# Patient Record
Sex: FEMALE | Race: BLACK OR AFRICAN AMERICAN | Employment: OTHER | URBAN - METROPOLITAN AREA
[De-identification: names, ages, dates, MRNs, and addresses within clinical notes are randomized per-mention and may not be internally consistent; named-entity substitution may affect disease eponyms.]

---

## 2017-01-01 ENCOUNTER — HOSPITAL ENCOUNTER (OUTPATIENT)
Dept: OTHER | Age: 72
Discharge: HOME | End: 2017-01-31
Attending: INTERNAL MEDICINE | Admitting: INTERNAL MEDICINE

## 2017-01-24 ENCOUNTER — ANTI-COAG VISIT (OUTPATIENT)
Dept: PHARMACY | Age: 72
End: 2017-01-24

## 2017-01-24 DIAGNOSIS — Z95.2 S/P AORTIC VALVE REPLACEMENT: ICD-10-CM

## 2017-01-24 DIAGNOSIS — I48.91 ATRIAL FIBRILLATION, UNSPECIFIED TYPE (HCC): ICD-10-CM

## 2017-01-24 DIAGNOSIS — Z95.2 S/P MITRAL VALVE REPLACEMENT: ICD-10-CM

## 2017-01-24 LAB
INR BLD: 2.1
PROTIME: 25.3 SECONDS

## 2017-02-20 ENCOUNTER — ANTI-COAG VISIT (OUTPATIENT)
Dept: PHARMACY | Age: 72
End: 2017-02-20

## 2017-02-20 DIAGNOSIS — Z95.2 S/P AORTIC VALVE REPLACEMENT: ICD-10-CM

## 2017-02-20 DIAGNOSIS — I48.91 ATRIAL FIBRILLATION, UNSPECIFIED TYPE (HCC): ICD-10-CM

## 2017-02-20 DIAGNOSIS — Z95.2 S/P MITRAL VALVE REPLACEMENT: ICD-10-CM

## 2017-02-20 LAB — INR BLD: 2.3

## 2017-03-01 ENCOUNTER — HOSPITAL ENCOUNTER (OUTPATIENT)
Dept: OTHER | Age: 72
Discharge: HOME | End: 2017-03-31
Attending: INTERNAL MEDICINE | Admitting: INTERNAL MEDICINE

## 2017-03-20 ENCOUNTER — ANTI-COAG VISIT (OUTPATIENT)
Dept: PHARMACY | Age: 72
End: 2017-03-20

## 2017-03-20 DIAGNOSIS — I48.91 ATRIAL FIBRILLATION, UNSPECIFIED TYPE (HCC): ICD-10-CM

## 2017-03-20 DIAGNOSIS — Z95.2 S/P AORTIC VALVE REPLACEMENT: ICD-10-CM

## 2017-03-20 DIAGNOSIS — Z95.2 S/P MITRAL VALVE REPLACEMENT: ICD-10-CM

## 2017-03-20 LAB
INR BLD: 1.9
PROTIME: 22.7 SECONDS

## 2017-04-03 ENCOUNTER — ANTI-COAG VISIT (OUTPATIENT)
Dept: PHARMACY | Age: 72
End: 2017-04-03

## 2017-04-03 DIAGNOSIS — Z95.2 S/P MITRAL VALVE REPLACEMENT: ICD-10-CM

## 2017-04-03 DIAGNOSIS — Z95.2 S/P AORTIC VALVE REPLACEMENT: ICD-10-CM

## 2017-04-03 DIAGNOSIS — I48.91 ATRIAL FIBRILLATION, UNSPECIFIED TYPE (HCC): ICD-10-CM

## 2017-04-03 LAB — INR BLD: 3

## 2017-04-17 ENCOUNTER — ANTI-COAG VISIT (OUTPATIENT)
Dept: PHARMACY | Age: 72
End: 2017-04-17

## 2017-04-17 DIAGNOSIS — Z95.2 S/P MITRAL VALVE REPLACEMENT: ICD-10-CM

## 2017-04-17 DIAGNOSIS — I48.91 ATRIAL FIBRILLATION, UNSPECIFIED TYPE (HCC): ICD-10-CM

## 2017-04-17 DIAGNOSIS — Z95.2 S/P AORTIC VALVE REPLACEMENT: ICD-10-CM

## 2017-04-17 LAB — INR BLD: 4

## 2017-05-01 ENCOUNTER — ANTI-COAG VISIT (OUTPATIENT)
Dept: PHARMACY | Age: 72
End: 2017-05-01

## 2017-05-01 DIAGNOSIS — Z95.2 S/P AORTIC VALVE REPLACEMENT: ICD-10-CM

## 2017-05-01 DIAGNOSIS — Z95.2 S/P MITRAL VALVE REPLACEMENT: ICD-10-CM

## 2017-05-01 DIAGNOSIS — I48.91 ATRIAL FIBRILLATION, UNSPECIFIED TYPE (HCC): ICD-10-CM

## 2017-05-01 LAB
INR BLD: 2.3
PROTIME: 27.8 SECONDS

## 2017-05-03 ENCOUNTER — TELEPHONE (OUTPATIENT)
Dept: PHARMACY | Age: 72
End: 2017-05-03

## 2017-05-31 ENCOUNTER — ANTI-COAG VISIT (OUTPATIENT)
Dept: PHARMACY | Age: 72
End: 2017-05-31

## 2017-05-31 DIAGNOSIS — Z95.2 S/P AORTIC VALVE REPLACEMENT: ICD-10-CM

## 2017-05-31 DIAGNOSIS — I48.91 ATRIAL FIBRILLATION, UNSPECIFIED TYPE (HCC): ICD-10-CM

## 2017-05-31 DIAGNOSIS — Z95.2 S/P MITRAL VALVE REPLACEMENT: ICD-10-CM

## 2017-05-31 LAB
INR BLD: 2
PROTIME: 23.5 SECONDS

## 2017-06-12 ENCOUNTER — ANTI-COAG VISIT (OUTPATIENT)
Dept: PHARMACY | Age: 72
End: 2017-06-12

## 2017-06-12 DIAGNOSIS — Z95.2 S/P MITRAL VALVE REPLACEMENT: ICD-10-CM

## 2017-06-12 DIAGNOSIS — Z95.2 S/P AORTIC VALVE REPLACEMENT: ICD-10-CM

## 2017-06-12 DIAGNOSIS — I48.91 ATRIAL FIBRILLATION, UNSPECIFIED TYPE (HCC): ICD-10-CM

## 2017-06-12 LAB — INR BLD: 2.8

## 2017-06-21 ENCOUNTER — OFFICE VISIT (OUTPATIENT)
Dept: CARDIOLOGY CLINIC | Age: 72
End: 2017-06-21

## 2017-06-21 VITALS
BODY MASS INDEX: 35.51 KG/M2 | HEIGHT: 62 IN | HEART RATE: 112 BPM | WEIGHT: 193 LBS | SYSTOLIC BLOOD PRESSURE: 116 MMHG | DIASTOLIC BLOOD PRESSURE: 78 MMHG | OXYGEN SATURATION: 97 %

## 2017-06-21 DIAGNOSIS — I48.92 ATRIAL FLUTTER, UNSPECIFIED TYPE (HCC): Primary | Chronic | ICD-10-CM

## 2017-06-21 DIAGNOSIS — I48.0 PAROXYSMAL ATRIAL FIBRILLATION (HCC): ICD-10-CM

## 2017-06-21 DIAGNOSIS — I35.1 AORTIC VALVE INSUFFICIENCY, UNSPECIFIED ETIOLOGY: Chronic | ICD-10-CM

## 2017-06-21 DIAGNOSIS — I34.2 NON-RHEUMATIC MITRAL VALVE STENOSIS: Chronic | ICD-10-CM

## 2017-06-21 PROCEDURE — G8400 PT W/DXA NO RESULTS DOC: HCPCS | Performed by: INTERNAL MEDICINE

## 2017-06-21 PROCEDURE — 99213 OFFICE O/P EST LOW 20 MIN: CPT | Performed by: INTERNAL MEDICINE

## 2017-06-21 PROCEDURE — G8427 DOCREV CUR MEDS BY ELIG CLIN: HCPCS | Performed by: INTERNAL MEDICINE

## 2017-06-21 PROCEDURE — 1090F PRES/ABSN URINE INCON ASSESS: CPT | Performed by: INTERNAL MEDICINE

## 2017-06-21 PROCEDURE — 4040F PNEUMOC VAC/ADMIN/RCVD: CPT | Performed by: INTERNAL MEDICINE

## 2017-06-21 PROCEDURE — 93000 ELECTROCARDIOGRAM COMPLETE: CPT | Performed by: INTERNAL MEDICINE

## 2017-06-21 PROCEDURE — G8417 CALC BMI ABV UP PARAM F/U: HCPCS | Performed by: INTERNAL MEDICINE

## 2017-06-21 PROCEDURE — 3017F COLORECTAL CA SCREEN DOC REV: CPT | Performed by: INTERNAL MEDICINE

## 2017-06-21 PROCEDURE — 3014F SCREEN MAMMO DOC REV: CPT | Performed by: INTERNAL MEDICINE

## 2017-06-21 PROCEDURE — 1123F ACP DISCUSS/DSCN MKR DOCD: CPT | Performed by: INTERNAL MEDICINE

## 2017-06-21 PROCEDURE — 1036F TOBACCO NON-USER: CPT | Performed by: INTERNAL MEDICINE

## 2017-07-11 ENCOUNTER — ANTI-COAG VISIT (OUTPATIENT)
Dept: PHARMACY | Age: 72
End: 2017-07-11

## 2017-07-11 ENCOUNTER — OFFICE VISIT (OUTPATIENT)
Dept: CARDIOLOGY CLINIC | Age: 72
End: 2017-07-11

## 2017-07-11 VITALS
HEART RATE: 79 BPM | BODY MASS INDEX: 36.53 KG/M2 | DIASTOLIC BLOOD PRESSURE: 74 MMHG | HEIGHT: 61 IN | WEIGHT: 193.5 LBS | SYSTOLIC BLOOD PRESSURE: 134 MMHG

## 2017-07-11 DIAGNOSIS — Z95.2 S/P AORTIC VALVE REPLACEMENT: ICD-10-CM

## 2017-07-11 DIAGNOSIS — Z95.2 S/P MITRAL VALVE REPLACEMENT: ICD-10-CM

## 2017-07-11 DIAGNOSIS — I48.0 PAROXYSMAL ATRIAL FIBRILLATION (HCC): ICD-10-CM

## 2017-07-11 DIAGNOSIS — I48.92 ATRIAL FLUTTER, UNSPECIFIED TYPE (HCC): ICD-10-CM

## 2017-07-11 DIAGNOSIS — I34.2 NON-RHEUMATIC MITRAL VALVE STENOSIS: ICD-10-CM

## 2017-07-11 DIAGNOSIS — I48.0 PAROXYSMAL ATRIAL FIBRILLATION (HCC): Primary | ICD-10-CM

## 2017-07-11 DIAGNOSIS — E78.5 HYPERLIPIDEMIA, UNSPECIFIED HYPERLIPIDEMIA TYPE: ICD-10-CM

## 2017-07-11 DIAGNOSIS — I35.1 AORTIC VALVE INSUFFICIENCY, UNSPECIFIED ETIOLOGY: ICD-10-CM

## 2017-07-11 LAB — INR BLD: 3

## 2017-07-11 PROCEDURE — 1090F PRES/ABSN URINE INCON ASSESS: CPT | Performed by: INTERNAL MEDICINE

## 2017-07-11 PROCEDURE — G8400 PT W/DXA NO RESULTS DOC: HCPCS | Performed by: INTERNAL MEDICINE

## 2017-07-11 PROCEDURE — 3014F SCREEN MAMMO DOC REV: CPT | Performed by: INTERNAL MEDICINE

## 2017-07-11 PROCEDURE — G8417 CALC BMI ABV UP PARAM F/U: HCPCS | Performed by: INTERNAL MEDICINE

## 2017-07-11 PROCEDURE — G8427 DOCREV CUR MEDS BY ELIG CLIN: HCPCS | Performed by: INTERNAL MEDICINE

## 2017-07-11 PROCEDURE — 3017F COLORECTAL CA SCREEN DOC REV: CPT | Performed by: INTERNAL MEDICINE

## 2017-07-11 PROCEDURE — 99213 OFFICE O/P EST LOW 20 MIN: CPT | Performed by: INTERNAL MEDICINE

## 2017-07-11 PROCEDURE — 1036F TOBACCO NON-USER: CPT | Performed by: INTERNAL MEDICINE

## 2017-07-11 PROCEDURE — 1123F ACP DISCUSS/DSCN MKR DOCD: CPT | Performed by: INTERNAL MEDICINE

## 2017-07-11 PROCEDURE — 4040F PNEUMOC VAC/ADMIN/RCVD: CPT | Performed by: INTERNAL MEDICINE

## 2017-07-11 RX ORDER — ACETAMINOPHEN 500 MG
500 TABLET ORAL EVERY 6 HOURS PRN
COMMUNITY

## 2017-07-12 RX ORDER — CARVEDILOL 6.25 MG/1
6.25 TABLET ORAL 2 TIMES DAILY WITH MEALS
Qty: 180 TABLET | Refills: 3 | Status: SHIPPED | OUTPATIENT
Start: 2017-07-12 | End: 2017-08-23 | Stop reason: SDUPTHER

## 2017-08-08 ENCOUNTER — ANTI-COAG VISIT (OUTPATIENT)
Dept: PHARMACY | Age: 72
End: 2017-08-08

## 2017-08-08 DIAGNOSIS — I48.0 PAROXYSMAL ATRIAL FIBRILLATION (HCC): ICD-10-CM

## 2017-08-08 DIAGNOSIS — Z95.2 S/P AORTIC VALVE REPLACEMENT: ICD-10-CM

## 2017-08-08 DIAGNOSIS — Z95.2 S/P MITRAL VALVE REPLACEMENT: ICD-10-CM

## 2017-08-08 LAB — INR BLD: 3.3

## 2017-08-23 RX ORDER — CARVEDILOL 6.25 MG/1
TABLET ORAL
Qty: 180 TABLET | Refills: 3 | Status: SHIPPED | OUTPATIENT
Start: 2017-08-23 | End: 2018-09-04 | Stop reason: SDUPTHER

## 2017-09-05 ENCOUNTER — ANTI-COAG VISIT (OUTPATIENT)
Dept: PHARMACY | Age: 72
End: 2017-09-05

## 2017-09-05 DIAGNOSIS — Z95.2 S/P AORTIC VALVE REPLACEMENT: ICD-10-CM

## 2017-09-05 DIAGNOSIS — I48.0 PAROXYSMAL ATRIAL FIBRILLATION (HCC): ICD-10-CM

## 2017-09-05 DIAGNOSIS — Z95.2 S/P MITRAL VALVE REPLACEMENT: ICD-10-CM

## 2017-09-05 LAB
INR BLD: 2.2
PROTIME: 25.9 SECONDS

## 2017-10-02 ENCOUNTER — ANTI-COAG VISIT (OUTPATIENT)
Dept: PHARMACY | Age: 72
End: 2017-10-02

## 2017-10-02 DIAGNOSIS — Z95.2 S/P MITRAL VALVE REPLACEMENT: ICD-10-CM

## 2017-10-02 DIAGNOSIS — I48.0 PAROXYSMAL ATRIAL FIBRILLATION (HCC): ICD-10-CM

## 2017-10-02 DIAGNOSIS — Z95.2 S/P AORTIC VALVE REPLACEMENT: ICD-10-CM

## 2017-10-02 LAB
INR BLD: 3.4
PROTIME: 41.3 SECONDS

## 2017-10-02 NOTE — MR AVS SNAPSHOT
people who are more muscular. Even a small weight loss (between 5 and 10 percent of your current weight) by decreasing your calorie intake and becoming more physically active will help lower your risk of developing or worsening diseases associated with obesity. Learn more at: [email or web address]             Medications and Orders      Your Current Medications Are              carvedilol (COREG) 6.25 MG tablet TAKE 1 TABLET TWICE DAILY WITH FOOD    acetaminophen (TYLENOL) 500 MG tablet Take 500 mg by mouth every 6 hours as needed for Pain    Glucosamine-Chondroitin (GLUCOSAMINE CHONDR COMPLEX PO) Take 1 tablet by mouth daily    Probiotic Product (PROBIOTIC DAILY PO) Take by mouth daily    Vitamin D (CHOLECALCIFEROL) 1000 UNITS CAPS capsule Take 1,000 Units by mouth daily    simvastatin (ZOCOR) 40 MG tablet Take 40 mg by mouth nightly    warfarin (COUMADIN) 5 MG tablet One tab po nightly and on Friday 1 1/2 tab    Multiple Vitamins-Minerals (CENTRUM SILVER) TABS Take 1 tablet by mouth daily. Allergies           No Known Allergies      We Ordered/Performed the following           Protime-INR     Comments: This external order was created through the results console.          Result Summary for Protime-INR      Result Information     Status          Final result (Resulted: 10/2/2017 10:22 AM)           10/2/2017 10:22 AM      Component Results     Component Value Ref Range & Units Status    INR 3.4  Final    Protime 41.3 seconds Final               Additional Information        Basic Information     Date Of Birth Sex Race Ethnicity Preferred Language    1945 Female Black Non-/Non  English      Problem List as of 10/2/2017  Date Reviewed: 7/11/2017                Paroxysmal atrial fibrillation (HCC) (Chronic)    Atrial flutter (Chronic)    S/P aortic valve replacement (Chronic)    S/P mitral valve replacement (Chronic)    Hyperlipidemia (Chronic) 5. Create a "[employer]t ID. This will be your [employer] login ID and cannot be changed, so think of one that is secure and easy to remember. 6. Create a [employer] password. You can change your password at any time. 7. Enter your Password Reset Question and Answer. This can be used at a later time if you forget your password. 8. Enter your e-mail address. You will receive e-mail notification when new information is available in [address] 9. Click Sign Up. You can now view your medical record. Additional Information  If you have questions, please contact the physician practice where you receive care. Remember, [employer] is NOT to be used for urgent needs. For medical emergencies, dial 911. For questions regarding your [employer] account call [phone]. If you have a clinical question, please call your doctor's office. October 2017 Details    Sun Mon Tue Wed Thu Fri Sat     1               2      5 mg   See details      3      5 mg         4      5 mg         5      5 mg         6      5 mg         7      5 mg           8      5 mg         9      5 mg         10      5 mg         11      5 mg         12      5 mg         13      5 mg         14      5 mg           15      5 mg         16      5 mg         17      5 mg         18      5 mg         19      5 mg         20      5 mg         21      5 mg           22      5 mg         23      5 mg         24      5 mg         25      5 mg         26      5 mg         27      5 mg         28      5 mg           29      5 mg         30      5 mg         31                    Date Details   10/02 This INR check       Date of next INR:  10/30/2017         How to take your warfarin dose              To take:  5 mg Take 1 of the 5 mg tablets.

## 2017-10-30 ENCOUNTER — HOSPITAL ENCOUNTER (OUTPATIENT)
Dept: MAMMOGRAPHY | Age: 72
Discharge: HOME | End: 2017-10-30
Attending: INTERNAL MEDICINE | Admitting: INTERNAL MEDICINE

## 2017-10-30 ENCOUNTER — ANTI-COAG VISIT (OUTPATIENT)
Dept: PHARMACY | Age: 72
End: 2017-10-30

## 2017-10-30 DIAGNOSIS — Z12.31 ENCOUNTER FOR SCREENING MAMMOGRAM FOR BREAST CANCER: ICD-10-CM

## 2017-10-30 DIAGNOSIS — Z95.2 S/P AORTIC VALVE REPLACEMENT: ICD-10-CM

## 2017-10-30 DIAGNOSIS — I48.0 PAROXYSMAL ATRIAL FIBRILLATION (HCC): ICD-10-CM

## 2017-10-30 DIAGNOSIS — Z95.2 S/P MITRAL VALVE REPLACEMENT: ICD-10-CM

## 2017-10-30 LAB
INR BLD: 1.9
PROTIME: 22.7 SECONDS

## 2017-10-30 NOTE — PROGRESS NOTES
Zuly Becerra Ms. Domenico Barrera is a 81 y.o. y/o female with history of Afib, Aortic and Mitral Valve Porcine valve  She presents today for anticoagulation monitoring and adjustment. Pertinent PMH: The aortic and mitral valves are porcine and were replaced at the same time on March 9, 2010 by Dr. Nikhil Méndez. Patient Reported Findings:  Yes     No  [x]   []       Patient verifies current dosing regimen as listed  []   [x]       S/S bleeding/bruising/swelling/SOB  []   [x]       Blood in urine or stool  []   [x]       Procedures scheduled in the future at this time  []   [x]       Missed Dose  []   [x]       Extra Dose  []   [x]       Change in medications  [x]   []       Change in health/diet/appetite. Patient states that she has had more vegetables. She does not think that she will be able to regulate vegetables. Discussed that this is the cause for fluctuating INR as patient eats spinach, broccoli, shahzad greens. []   [x]       Change in alcohol use  []   [x]       Change in activity  []   [x]       Hospital admission  []   [x]       Emergency department visit  []   [x]       Other complaints    Clinical Outcomes:  Yes     No  []   [x]       Major bleeding event  []   [x]       Thromboembolic event    Duration of warfarin Therapy: indefinitely  INR Range:  2.0-3.0 Keep at 2.5-3.0 per the collaborative agreement from Dr. Rondell Harada    INR 1.9 today d/t recent change in vegetable diet. Take 7.5 mg tonight then continue same weekly warfarin dose of 5mg daily. Discussed about establishing a consistency of her vegetable/salad diet.  Going to begin eating vegetables twice a week rather than the large amount she has been eating  Recheck INR in 4 weeks, 11/27  Consider resuming 6 week appointments when appropriate    Referring cardiologist is Dr. Rondell Harada --> getting new cardiologist in [facility], assess at next visit  INR (no units)   Date Value   10/30/2017 1.9   10/02/2017 3.4   09/05/2017 2.2   08/08/2017 3.3

## 2017-11-01 ENCOUNTER — HOSPITAL ENCOUNTER (OUTPATIENT)
Dept: OTHER | Age: 72
Discharge: HOME | End: 2017-11-30
Attending: INTERNAL MEDICINE | Admitting: INTERNAL MEDICINE

## 2017-11-27 ENCOUNTER — ANTI-COAG VISIT (OUTPATIENT)
Dept: PHARMACY | Age: 72
End: 2017-11-27

## 2017-11-27 DIAGNOSIS — Z95.2 S/P AORTIC VALVE REPLACEMENT: ICD-10-CM

## 2017-11-27 DIAGNOSIS — Z95.2 S/P MITRAL VALVE REPLACEMENT: ICD-10-CM

## 2017-11-27 DIAGNOSIS — I48.0 PAROXYSMAL ATRIAL FIBRILLATION (HCC): ICD-10-CM

## 2017-11-27 LAB
INR BLD: 1.9
PROTIME: 22.5 SECONDS

## 2017-11-27 RX ORDER — FERROUS SULFATE 325(65) MG
325 TABLET ORAL
COMMUNITY
End: 2018-04-24

## 2017-11-27 RX ORDER — CALCIUM CARBONATE 500(1250)
500 TABLET ORAL DAILY
COMMUNITY
End: 2018-04-24

## 2017-11-27 NOTE — PROGRESS NOTES
Rosemarie Martino Ms. Aron Mcknight is a 81 y.o. y/o female with history of Afib, Aortic and Mitral Valve Porcine valve  She presents today for anticoagulation monitoring and adjustment. Pertinent PMH: The aortic and mitral valves are porcine and were replaced at the same time on March 9, 2010 by Dr. Delmer Post. Patient Reported Findings:  Yes     No  [x]   []       Patient verifies current dosing regimen as listed  []   [x]       S/S bleeding/bruising/swelling/SOB  []   [x]       Blood in urine or stool  []   [x]       Procedures scheduled in the future at this time  []   [x]       Missed Dose  []   [x]       Extra Dose  []   [x]       Change in medications  []   []       Change in health/diet/appetite. She has stated in the past that she does not think that she can regulate vegetables. Discussed that this is the cause for fluctuating INR as patient eats spinach, broccoli, shahzad greens.  Again emphasized the need for consitency  []   [x]       Change in alcohol use  []   [x]       Change in activity  []   [x]       Hospital admission  []   [x]       Emergency department visit  []   [x]       Other complaints    Clinical Outcomes:  Yes     No  []   [x]       Major bleeding event  []   [x]       Thromboembolic event    Duration of warfarin Therapy: indefinitely  INR Range:  2.0-3.0 Keep at 2.5-3.0 per the collaborative agreement from Dr. Damon Abdullahi    INR 1.9 again today   Add 1mg (pinktablet) by increasing weekly dose to 6mg on Mon & Fri only and 5mg all other days(5.7% increase)  Recheck INR in 4 weeks  Consider resuming 6 week appointments when appropriate    Referring cardiologist is Dr. Stewart(formerly with Dr. Damon Abdullahi)  INR (no units)   Date Value   11/27/2017 1.9   10/30/2017 1.9   10/02/2017 3.4   09/05/2017 2.2

## 2017-11-28 ENCOUNTER — HOSPITAL ENCOUNTER (OUTPATIENT)
Dept: OTHER | Age: 72
Discharge: HOME | End: 2017-11-28
Attending: INTERNAL MEDICINE | Admitting: INTERNAL MEDICINE

## 2017-12-01 ENCOUNTER — HOSPITAL ENCOUNTER (OUTPATIENT)
Dept: OTHER | Age: 72
Discharge: HOME | End: 2017-12-31
Attending: INTERNAL MEDICINE | Admitting: INTERNAL MEDICINE

## 2017-12-27 ENCOUNTER — HOSPITAL ENCOUNTER (OUTPATIENT)
Dept: OTHER | Age: 72
Discharge: HOME | End: 2017-12-27
Attending: INTERNAL MEDICINE | Admitting: INTERNAL MEDICINE

## 2017-12-27 ENCOUNTER — ANTI-COAG VISIT (OUTPATIENT)
Dept: PHARMACY | Age: 72
End: 2017-12-27

## 2017-12-27 DIAGNOSIS — Z95.2 S/P MITRAL VALVE REPLACEMENT: ICD-10-CM

## 2017-12-27 DIAGNOSIS — I48.0 PAROXYSMAL ATRIAL FIBRILLATION (HCC): ICD-10-CM

## 2017-12-27 DIAGNOSIS — Z95.2 S/P AORTIC VALVE REPLACEMENT: ICD-10-CM

## 2017-12-27 LAB
A/G RATIO: 1.2 (ref 1.1–2.2)
ALBUMIN SERPL-MCNC: 4.4 G/DL (ref 3.4–5)
ALP BLD-CCNC: 103 U/L (ref 40–129)
ALT SERPL-CCNC: 15 U/L (ref 10–40)
ANION GAP SERPL CALCULATED.3IONS-SCNC: 16 MMOL/L (ref 3–16)
AST SERPL-CCNC: 20 U/L (ref 15–37)
BILIRUB SERPL-MCNC: 0.4 MG/DL (ref 0–1)
BILIRUBIN URINE: NEGATIVE
BLOOD, URINE: ABNORMAL
BUN BLDV-MCNC: 12 MG/DL (ref 7–20)
CALCIUM SERPL-MCNC: 9.8 MG/DL (ref 8.3–10.6)
CHLORIDE BLD-SCNC: 104 MMOL/L (ref 99–110)
CHOLESTEROL, TOTAL: 139 MG/DL (ref 0–199)
CLARITY: ABNORMAL
CO2: 26 MMOL/L (ref 21–32)
COLOR: YELLOW
COMMENT UA: ABNORMAL
CREAT SERPL-MCNC: 0.9 MG/DL (ref 0.6–1.2)
EPITHELIAL CELLS, UA: 1 /HPF (ref 0–5)
GFR AFRICAN AMERICAN: >60
GFR NON-AFRICAN AMERICAN: >60
GLOBULIN: 3.8 G/DL
GLUCOSE BLD-MCNC: 98 MG/DL (ref 70–99)
GLUCOSE URINE: NEGATIVE MG/DL
HCT VFR BLD CALC: 39.2 % (ref 36–48)
HDLC SERPL-MCNC: 49 MG/DL (ref 40–60)
HEMOGLOBIN: 12.7 G/DL (ref 12–16)
HYALINE CASTS: 1 /LPF (ref 0–8)
INR BLD: 3.3
KETONES, URINE: NEGATIVE MG/DL
LDL CHOLESTEROL CALCULATED: 67 MG/DL
LEUKOCYTE ESTERASE, URINE: ABNORMAL
MCH RBC QN AUTO: 28 PG (ref 26–34)
MCHC RBC AUTO-ENTMCNC: 32.3 G/DL (ref 31–36)
MCV RBC AUTO: 86.5 FL (ref 80–100)
MICROSCOPIC EXAMINATION: YES
NITRITE, URINE: NEGATIVE
PDW BLD-RTO: 14.5 % (ref 12.4–15.4)
PH UA: 6.5
PLATELET # BLD: 204 K/UL (ref 135–450)
PMV BLD AUTO: 9.6 FL (ref 5–10.5)
POTASSIUM SERPL-SCNC: 4.1 MMOL/L (ref 3.5–5.1)
PROTEIN UA: NEGATIVE MG/DL
PROTIME: 39.6 SECONDS
RBC # BLD: 4.53 M/UL (ref 4–5.2)
RBC UA: 2 /HPF (ref 0–4)
SODIUM BLD-SCNC: 146 MMOL/L (ref 136–145)
SPECIFIC GRAVITY UA: 1.02
TOTAL PROTEIN: 8.2 G/DL (ref 6.4–8.2)
TRIGL SERPL-MCNC: 116 MG/DL (ref 0–150)
URINE TYPE: ABNORMAL
UROBILINOGEN, URINE: 1 E.U./DL
VLDLC SERPL CALC-MCNC: 23 MG/DL
WBC # BLD: 5.3 K/UL (ref 4–11)
WBC UA: 24 /HPF (ref 0–5)

## 2017-12-27 NOTE — PROGRESS NOTES
Santiago Price Ms. Luz Elena Ortiz is a 81 y.o. y/o female with history of Afib, Aortic and Mitral Valve Porcine valve  She presents today for anticoagulation monitoring and adjustment. Pertinent PMH: The aortic and mitral valves are porcine and were replaced at the same time on March 9, 2010 by Dr. Sherri Abdullahi. Patient Reported Findings:  Yes     No  [x]   []       Patient verifies current dosing regimen as listed  []   [x]       S/S bleeding/bruising/swelling/SOB  []   [x]       Blood in urine or stool  []   [x]       Procedures scheduled in the future at this time  []   [x]       Missed Dose  []   [x]       Extra Dose  []   [x]       Change in medications  []   []       Change in health/diet/appetite. She has stated in the past that she does not think that she can regulate vegetables. Discussed that this is the cause for fluctuating INR as patient eats spinach, broccoli, shahzad greens. Again emphasized the need for consitency  []   [x]       Change in alcohol use  []   [x]       Change in activity  []   [x]       Hospital admission  []   [x]       Emergency department visit  []   [x]       Other complaints    Clinical Outcomes:  Yes     No  []   [x]       Major bleeding event  []   [x]       Thromboembolic event    Duration of warfarin Therapy: indefinitely  INR Range:  2.0-3.0 Keep at 2.5-3.0 per the collaborative agreement from Dr. Iveth Johnson    INR 3.3 today   Decrease weekly dose to 6mg on Mon only and 5mg all other days(2.7% decrease)  Recheck INR in 3 weeks, 1/17  Consider resuming 6 week appointments when appropriate    No new collaborative was sent to Dr. John Swain. Sent new collaborative for MD to sign. Will need patient signature at next appt.      Referring cardiologist is Dr. Stephens Pod with Dr. Iveth Johnson)   INR (no units)   Date Value   12/27/2017 3.3   11/27/2017 1.9   10/30/2017 1.9   10/02/2017 3.4

## 2018-01-01 ENCOUNTER — HOSPITAL ENCOUNTER (OUTPATIENT)
Dept: OTHER | Age: 73
Discharge: HOME | End: 2018-01-31
Attending: INTERNAL MEDICINE | Admitting: INTERNAL MEDICINE

## 2018-01-03 ENCOUNTER — TELEPHONE (OUTPATIENT)
Dept: PHARMACY | Age: 73
End: 2018-01-03

## 2018-01-17 ENCOUNTER — ANTI-COAG VISIT (OUTPATIENT)
Dept: PHARMACY | Age: 73
End: 2018-01-17

## 2018-01-17 DIAGNOSIS — Z95.2 S/P AORTIC VALVE REPLACEMENT: ICD-10-CM

## 2018-01-17 DIAGNOSIS — I48.0 PAROXYSMAL ATRIAL FIBRILLATION (HCC): ICD-10-CM

## 2018-01-17 DIAGNOSIS — Z95.2 S/P MITRAL VALVE REPLACEMENT: ICD-10-CM

## 2018-01-17 LAB
INR BLD: 2.1
PROTIME: 25.2 SECONDS

## 2018-02-01 ENCOUNTER — HOSPITAL ENCOUNTER (OUTPATIENT)
Dept: OTHER | Age: 73
Discharge: HOME | End: 2018-02-28
Attending: INTERNAL MEDICINE | Admitting: INTERNAL MEDICINE

## 2018-02-14 ENCOUNTER — HOSPITAL ENCOUNTER (OUTPATIENT)
Dept: OTHER | Age: 73
Discharge: HOME | End: 2018-02-14
Attending: INTERNAL MEDICINE | Admitting: INTERNAL MEDICINE

## 2018-02-14 ENCOUNTER — ANTI-COAG VISIT (OUTPATIENT)
Dept: PHARMACY | Age: 73
End: 2018-02-14

## 2018-02-14 DIAGNOSIS — I48.0 PAROXYSMAL ATRIAL FIBRILLATION (HCC): ICD-10-CM

## 2018-02-14 DIAGNOSIS — Z95.2 S/P AORTIC VALVE REPLACEMENT: ICD-10-CM

## 2018-02-14 DIAGNOSIS — Z95.2 S/P MITRAL VALVE REPLACEMENT: ICD-10-CM

## 2018-02-14 LAB
INR BLD: 2.5
PROTIME: 30.3 SECONDS
VITAMIN D 25-HYDROXY: 57 NG/ML

## 2018-03-01 ENCOUNTER — HOSPITAL ENCOUNTER (OUTPATIENT)
Dept: OTHER | Age: 73
Discharge: HOME | End: 2018-03-31
Attending: INTERNAL MEDICINE | Admitting: INTERNAL MEDICINE

## 2018-03-14 ENCOUNTER — ANTI-COAG VISIT (OUTPATIENT)
Dept: PHARMACY | Age: 73
End: 2018-03-14

## 2018-03-14 DIAGNOSIS — I48.0 PAROXYSMAL ATRIAL FIBRILLATION (HCC): ICD-10-CM

## 2018-03-14 DIAGNOSIS — Z95.2 S/P AORTIC VALVE REPLACEMENT: ICD-10-CM

## 2018-03-14 DIAGNOSIS — Z95.2 S/P MITRAL VALVE REPLACEMENT: ICD-10-CM

## 2018-03-14 LAB
INR BLD: 2.6
PROTIME: 30.9 SECONDS

## 2018-04-01 ENCOUNTER — HOSPITAL ENCOUNTER (OUTPATIENT)
Dept: OTHER | Age: 73
Discharge: HOME | End: 2018-04-30
Attending: INTERNAL MEDICINE | Admitting: INTERNAL MEDICINE

## 2018-04-11 ENCOUNTER — ANTI-COAG VISIT (OUTPATIENT)
Dept: PHARMACY | Age: 73
End: 2018-04-11

## 2018-04-11 DIAGNOSIS — Z95.2 S/P MITRAL VALVE REPLACEMENT: ICD-10-CM

## 2018-04-11 DIAGNOSIS — Z95.2 S/P AORTIC VALVE REPLACEMENT: ICD-10-CM

## 2018-04-11 DIAGNOSIS — I48.0 PAROXYSMAL ATRIAL FIBRILLATION (HCC): ICD-10-CM

## 2018-04-11 LAB
INR BLD: 2.3
PROTIME: 27 SECONDS

## 2018-04-24 ENCOUNTER — OFFICE VISIT (OUTPATIENT)
Dept: CARDIOLOGY CLINIC | Age: 73
End: 2018-04-24

## 2018-04-24 ENCOUNTER — TELEPHONE (OUTPATIENT)
Dept: CARDIOLOGY CLINIC | Age: 73
End: 2018-04-24

## 2018-04-24 VITALS
SYSTOLIC BLOOD PRESSURE: 112 MMHG | HEART RATE: 76 BPM | BODY MASS INDEX: 34.96 KG/M2 | HEIGHT: 62 IN | WEIGHT: 190 LBS | DIASTOLIC BLOOD PRESSURE: 70 MMHG

## 2018-04-24 DIAGNOSIS — E78.5 HYPERLIPIDEMIA, UNSPECIFIED HYPERLIPIDEMIA TYPE: Chronic | ICD-10-CM

## 2018-04-24 DIAGNOSIS — Z95.2 S/P MITRAL VALVE REPLACEMENT: Chronic | ICD-10-CM

## 2018-04-24 DIAGNOSIS — I48.0 PAROXYSMAL ATRIAL FIBRILLATION (HCC): Primary | Chronic | ICD-10-CM

## 2018-04-24 DIAGNOSIS — Z95.2 S/P AORTIC VALVE REPLACEMENT: Chronic | ICD-10-CM

## 2018-04-24 PROCEDURE — 1090F PRES/ABSN URINE INCON ASSESS: CPT | Performed by: INTERNAL MEDICINE

## 2018-04-24 PROCEDURE — G8400 PT W/DXA NO RESULTS DOC: HCPCS | Performed by: INTERNAL MEDICINE

## 2018-04-24 PROCEDURE — 4040F PNEUMOC VAC/ADMIN/RCVD: CPT | Performed by: INTERNAL MEDICINE

## 2018-04-24 PROCEDURE — G8427 DOCREV CUR MEDS BY ELIG CLIN: HCPCS | Performed by: INTERNAL MEDICINE

## 2018-04-24 PROCEDURE — 1036F TOBACCO NON-USER: CPT | Performed by: INTERNAL MEDICINE

## 2018-04-24 PROCEDURE — 99214 OFFICE O/P EST MOD 30 MIN: CPT | Performed by: INTERNAL MEDICINE

## 2018-04-24 PROCEDURE — G8417 CALC BMI ABV UP PARAM F/U: HCPCS | Performed by: INTERNAL MEDICINE

## 2018-04-24 PROCEDURE — 1123F ACP DISCUSS/DSCN MKR DOCD: CPT | Performed by: INTERNAL MEDICINE

## 2018-04-24 PROCEDURE — 3017F COLORECTAL CA SCREEN DOC REV: CPT | Performed by: INTERNAL MEDICINE

## 2018-05-01 ENCOUNTER — HOSPITAL ENCOUNTER (OUTPATIENT)
Dept: OTHER | Age: 73
Discharge: HOME | End: 2018-05-31
Attending: INTERNAL MEDICINE | Admitting: INTERNAL MEDICINE

## 2018-05-09 ENCOUNTER — ANTI-COAG VISIT (OUTPATIENT)
Dept: PHARMACY | Age: 73
End: 2018-05-09

## 2018-05-09 DIAGNOSIS — I48.0 PAROXYSMAL ATRIAL FIBRILLATION (HCC): ICD-10-CM

## 2018-05-09 DIAGNOSIS — Z95.2 S/P MITRAL VALVE REPLACEMENT: ICD-10-CM

## 2018-05-09 DIAGNOSIS — Z95.2 S/P AORTIC VALVE REPLACEMENT: ICD-10-CM

## 2018-05-09 LAB
INR BLD: 4.7
PROTIME: 56.3 SECONDS

## 2018-05-18 ENCOUNTER — ANTI-COAG VISIT (OUTPATIENT)
Dept: PHARMACY | Age: 73
End: 2018-05-18

## 2018-05-18 DIAGNOSIS — Z95.2 S/P MITRAL VALVE REPLACEMENT: ICD-10-CM

## 2018-05-18 DIAGNOSIS — I48.0 PAROXYSMAL ATRIAL FIBRILLATION (HCC): ICD-10-CM

## 2018-05-18 DIAGNOSIS — Z95.2 S/P AORTIC VALVE REPLACEMENT: ICD-10-CM

## 2018-05-18 LAB
INR BLD: 2.2
PROTIME: 26.8 SECONDS

## 2018-05-25 ENCOUNTER — TELEPHONE (OUTPATIENT)
Dept: CARDIOLOGY CLINIC | Age: 73
End: 2018-05-25

## 2018-06-01 ENCOUNTER — HOSPITAL ENCOUNTER (OUTPATIENT)
Dept: OTHER | Age: 73
Discharge: HOME | End: 2018-06-30
Attending: INTERNAL MEDICINE | Admitting: INTERNAL MEDICINE

## 2018-06-14 ENCOUNTER — HOSPITAL ENCOUNTER (OUTPATIENT)
Dept: ENDOSCOPY | Age: 73
Discharge: HOME | End: 2018-06-14
Attending: INTERNAL MEDICINE | Admitting: INTERNAL MEDICINE

## 2018-06-14 LAB
INR BLD: 1.34 (ref 0.86–1.14)
PROTHROMBIN TIME: 15.3 SEC (ref 9.8–13)

## 2018-06-14 RX ORDER — SODIUM CHLORIDE 0.9 % (FLUSH) 0.9 %
10 SYRINGE (ML) INJECTION PRN
Status: DISCONTINUED | OUTPATIENT
Start: 2018-06-14 | End: 2018-06-15 | Stop reason: HOSPADM

## 2018-06-14 RX ORDER — SODIUM CHLORIDE 0.9 % (FLUSH) 0.9 %
10 SYRINGE (ML) INJECTION EVERY 12 HOURS SCHEDULED
Status: DISCONTINUED | OUTPATIENT
Start: 2018-06-14 | End: 2018-06-15 | Stop reason: HOSPADM

## 2018-06-14 RX ORDER — SODIUM CHLORIDE 9 MG/ML
INJECTION, SOLUTION INTRAVENOUS CONTINUOUS
Status: DISCONTINUED | OUTPATIENT
Start: 2018-06-14 | End: 2018-06-15 | Stop reason: HOSPADM

## 2018-06-18 ENCOUNTER — ANTI-COAG VISIT (OUTPATIENT)
Dept: PHARMACY | Age: 73
End: 2018-06-18

## 2018-06-18 DIAGNOSIS — I48.0 PAROXYSMAL ATRIAL FIBRILLATION (HCC): ICD-10-CM

## 2018-06-18 DIAGNOSIS — Z95.2 S/P AORTIC VALVE REPLACEMENT: ICD-10-CM

## 2018-06-18 DIAGNOSIS — Z95.2 S/P MITRAL VALVE REPLACEMENT: ICD-10-CM

## 2018-06-18 LAB
INR BLD: 1.8
PROTIME: 22 SECONDS

## 2018-06-21 ENCOUNTER — OFFICE VISIT (OUTPATIENT)
Dept: CARDIOLOGY CLINIC | Age: 73
End: 2018-06-21

## 2018-06-21 VITALS
HEIGHT: 62 IN | WEIGHT: 189 LBS | DIASTOLIC BLOOD PRESSURE: 72 MMHG | HEART RATE: 73 BPM | SYSTOLIC BLOOD PRESSURE: 118 MMHG | BODY MASS INDEX: 34.78 KG/M2

## 2018-06-21 DIAGNOSIS — I48.3 TYPICAL ATRIAL FLUTTER (HCC): ICD-10-CM

## 2018-06-21 DIAGNOSIS — Z79.01 WARFARIN ANTICOAGULATION: ICD-10-CM

## 2018-06-21 DIAGNOSIS — Z95.2 S/P AORTIC VALVE REPLACEMENT: Chronic | ICD-10-CM

## 2018-06-21 DIAGNOSIS — Z95.2 S/P MITRAL VALVE REPLACEMENT: Primary | Chronic | ICD-10-CM

## 2018-06-21 PROCEDURE — G8400 PT W/DXA NO RESULTS DOC: HCPCS | Performed by: INTERNAL MEDICINE

## 2018-06-21 PROCEDURE — G8417 CALC BMI ABV UP PARAM F/U: HCPCS | Performed by: INTERNAL MEDICINE

## 2018-06-21 PROCEDURE — 3017F COLORECTAL CA SCREEN DOC REV: CPT | Performed by: INTERNAL MEDICINE

## 2018-06-21 PROCEDURE — 4040F PNEUMOC VAC/ADMIN/RCVD: CPT | Performed by: INTERNAL MEDICINE

## 2018-06-21 PROCEDURE — 1036F TOBACCO NON-USER: CPT | Performed by: INTERNAL MEDICINE

## 2018-06-21 PROCEDURE — 99213 OFFICE O/P EST LOW 20 MIN: CPT | Performed by: INTERNAL MEDICINE

## 2018-06-21 PROCEDURE — 1090F PRES/ABSN URINE INCON ASSESS: CPT | Performed by: INTERNAL MEDICINE

## 2018-06-21 PROCEDURE — 93000 ELECTROCARDIOGRAM COMPLETE: CPT | Performed by: INTERNAL MEDICINE

## 2018-06-21 PROCEDURE — 1123F ACP DISCUSS/DSCN MKR DOCD: CPT | Performed by: INTERNAL MEDICINE

## 2018-06-21 PROCEDURE — G8427 DOCREV CUR MEDS BY ELIG CLIN: HCPCS | Performed by: INTERNAL MEDICINE

## 2018-07-01 ENCOUNTER — HOSPITAL ENCOUNTER (OUTPATIENT)
Dept: OTHER | Age: 73
Discharge: HOME | End: 2018-07-31
Attending: INTERNAL MEDICINE | Admitting: INTERNAL MEDICINE

## 2018-07-02 ENCOUNTER — ANTI-COAG VISIT (OUTPATIENT)
Dept: PHARMACY | Age: 73
End: 2018-07-02

## 2018-07-02 DIAGNOSIS — Z95.2 S/P MITRAL VALVE REPLACEMENT: ICD-10-CM

## 2018-07-02 DIAGNOSIS — Z95.2 S/P AORTIC VALVE REPLACEMENT: ICD-10-CM

## 2018-07-02 DIAGNOSIS — I48.0 PAROXYSMAL ATRIAL FIBRILLATION (HCC): ICD-10-CM

## 2018-07-02 LAB
INR BLD: 3.5
PROTIME: 42.4 SECONDS

## 2018-07-02 NOTE — PROGRESS NOTES
Alisson Banuelos Ms. Kelvin Drew is a 72 y.o. y/o female with history of Afib, Aortic and Mitral Valve Porcine valve. She presents today for anticoagulation monitoring and adjustment. Pertinent PMH: The aortic and mitral valves are porcine and were replaced at the same time on March 9, 2010 by Dr. Mili Crowell. Patient Reported Findings:  Yes     No  [x]   []       Patient verifies current dosing regimen as listed  []   [x]       S/S bleeding/bruising/swelling/SOB   []   [x]       Blood in urine or stool - Denies  []   [x]       Procedures scheduled in the future at this time  - Had colonoscopy on 6/14, held warfarin 3 days prior then restarted taking normal weekly dose, was dosed per cardiology. []   [x]       Missed Dose  []   [x]       Extra Dose  []   [x]       Change in medications  [x]   []       Change in health/diet/appetite. - Reports fluctuation in vegetable intake again. She has stated in the past that she does not think that she can regulate vegetables. Have discussed that this is the cause for fluctuating INR as patient normally eats spinach, broccoli, shahzad greens. Continue to emphasize the need for consistency. []   [x]       Change in alcohol use  []   [x]       Change in activity  []   [x]       Hospital admission  []   [x]       Emergency department visit  []   [x]       Other complaints    Clinical Outcomes:  Yes     No  []   [x]       Major bleeding event  []   [x]       Thromboembolic event    Duration of warfarin Therapy: Indefinitely  INR Range:  2.0-3.0   Keep at 2.5-3.0 per the collaborative agreement from Dr. Mary Vincent    INR 3.5 today. Possibly d/t small boost at last visit and/or inconsistency with vitamin K intake. Take 5mg tonight only, then continue same weekly dose of 6mg on Mondays and 5mg all other days. Encouraged to maintain a consistency of vegetables/salads. Recheck INR in 2 weeks, 7/16.      Referring cardiologist is Dr. Jose Esparza  INR (no units)   Date Value   07/02/2018 3.5

## 2018-07-16 ENCOUNTER — ANTI-COAG VISIT (OUTPATIENT)
Dept: PHARMACY | Age: 73
End: 2018-07-16

## 2018-07-16 DIAGNOSIS — Z95.2 S/P AORTIC VALVE REPLACEMENT: ICD-10-CM

## 2018-07-16 DIAGNOSIS — Z95.2 S/P MITRAL VALVE REPLACEMENT: ICD-10-CM

## 2018-07-16 DIAGNOSIS — I48.0 PAROXYSMAL ATRIAL FIBRILLATION (HCC): ICD-10-CM

## 2018-07-16 LAB
INR BLD: 3.9
PROTIME: 47.4 SECONDS

## 2018-07-16 NOTE — PROGRESS NOTES
Kamran Reed Ms. Nato Burnette is a 72 y.o. y/o female with history of Afib, Aortic and Mitral Valve Porcine valve. She presents today for anticoagulation monitoring and adjustment. Pertinent PMH: The aortic and mitral valves are porcine and were replaced at the same time on March 9, 2010 by Dr. Norma Chandler. Patient Reported Findings:  Yes     No  [x]   []       Patient verifies current dosing regimen as listed  []   [x]       S/S bleeding/bruising/swelling/SOB   []   [x]       Blood in urine or stool - Denies  []   [x]       Procedures scheduled in the future at this time  []   [x]       Missed Dose  []   [x]       Extra Dose  []   [x]       Change in medications  [x]   []       Change in health/diet/appetite. - She states that she thinks her vegetable diet has remained the same. She has stated in the past that she does not think that she can regulate vegetables since she is dependent on what is served at the \"mission\" where she eats. Have discussed that this is the cause for fluctuating INR as patient normally eats spinach, broccoli, shahzad greens. Continue to emphasize the need for consistency. []   [x]       Change in alcohol use  []   [x]       Change in activity  []   [x]       Hospital admission  []   [x]       Emergency department visit  []   [x]       Other complaints    Clinical Outcomes:  Yes     No  []   [x]       Major bleeding event  []   [x]       Thromboembolic event    Duration of warfarin Therapy: Indefinitely  INR Range:  2.0-3.0   Keep at 2.5-3.0 per the collaborative agreement from Dr. Kiko Garcia    INR 3.9 today.     Hold dose today only then slight decrease weekly dose to 5mg daily(2.8% decrease)  Recheck INR in 2 weeks on 7/30    Referring cardiologist is Dr. Saba Rhoades  INR (no units)   Date Value   07/16/2018 3.9   07/02/2018 3.5   06/18/2018 1.8   06/14/2018 1.34 (H)

## 2018-07-30 ENCOUNTER — ANTI-COAG VISIT (OUTPATIENT)
Dept: PHARMACY | Age: 73
End: 2018-07-30

## 2018-07-30 DIAGNOSIS — Z95.2 S/P MITRAL VALVE REPLACEMENT: ICD-10-CM

## 2018-07-30 DIAGNOSIS — Z95.2 S/P AORTIC VALVE REPLACEMENT: ICD-10-CM

## 2018-07-30 DIAGNOSIS — I48.0 PAROXYSMAL ATRIAL FIBRILLATION (HCC): ICD-10-CM

## 2018-07-30 LAB
INR BLD: 3.2
PROTIME: 38.3 SECONDS

## 2018-07-30 NOTE — PROGRESS NOTES
Mitchell Beach is a 72 y.o. y/o female with history of Afib, Aortic and Mitral Valve Porcine valve. She presents today for anticoagulation monitoring and adjustment. Pertinent PMH: The aortic and mitral valves are porcine and were replaced at the same time on March 9, 2010 by Dr. Emilia Oliva. Patient Reported Findings:  Yes     No  [x]   []       Patient verifies current dosing regimen as listed  []   [x]       S/S bleeding/bruising/swelling/SOB   []   [x]       Blood in urine or stool - Denies  []   [x]       Procedures scheduled in the future at this time  []   [x]       Missed Dose  []   [x]       Extra Dose  []   [x]       Change in medications  []   [x]       Change in health/diet/appetite. - She states that she thinks her vegetable diet has remained the same. She has stated in the past that she does not think that she can regulate vegetables since she is dependent on what is served at the \"mission\" where she eats. Have discussed that this is the cause for fluctuating INR as patient normally eats spinach, broccoli, shahzad greens and salads with iceberg other lower vitamin K veggies. Continue to emphasize the need for consistency. []   [x]       Change in alcohol use  []   [x]       Change in activity  []   [x]       Hospital admission  []   [x]       Emergency department visit  []   [x]       Other complaints    Clinical Outcomes:  Yes     No  []   [x]       Major bleeding event  []   [x]       Thromboembolic event    Duration of warfarin Therapy: Indefinitely  INR Range:  2.0-3.0   Keep at 2.5-3.0 per the collaborative agreement from Dr. Juliette Bryan    INR 3.2 today. Continue same weekly dose of 5mg daily. Encouraged to have some greens tonight and have them weekly.   Recheck INR in 3 weeks on 8/20    Referring cardiologist is Dr. Mary Bradley  INR (no units)   Date Value   07/30/2018 3.2   07/16/2018 3.9   07/02/2018 3.5   06/18/2018 1.8

## 2018-08-01 ENCOUNTER — HOSPITAL ENCOUNTER (OUTPATIENT)
Dept: OTHER | Age: 73
Discharge: HOME | End: 2018-08-31
Attending: INTERNAL MEDICINE | Admitting: INTERNAL MEDICINE

## 2018-08-20 ENCOUNTER — ANTI-COAG VISIT (OUTPATIENT)
Dept: PHARMACY | Age: 73
End: 2018-08-20

## 2018-08-20 DIAGNOSIS — Z95.2 S/P MITRAL VALVE REPLACEMENT: ICD-10-CM

## 2018-08-20 DIAGNOSIS — I48.0 PAROXYSMAL ATRIAL FIBRILLATION (HCC): ICD-10-CM

## 2018-08-20 DIAGNOSIS — Z95.2 S/P AORTIC VALVE REPLACEMENT: ICD-10-CM

## 2018-08-20 LAB
INR BLD: 2.8
PROTIME: 34.1 SECONDS

## 2018-09-01 ENCOUNTER — HOSPITAL ENCOUNTER (OUTPATIENT)
Dept: OTHER | Age: 73
Discharge: HOME | End: 2018-09-01
Attending: INTERNAL MEDICINE | Admitting: INTERNAL MEDICINE

## 2018-09-04 RX ORDER — CARVEDILOL 6.25 MG/1
TABLET ORAL
Qty: 180 TABLET | Refills: 3 | Status: SHIPPED | OUTPATIENT
Start: 2018-09-04 | End: 2018-09-05 | Stop reason: SDUPTHER

## 2018-09-06 RX ORDER — CARVEDILOL 6.25 MG/1
TABLET ORAL
Qty: 180 TABLET | Refills: 3 | Status: SHIPPED | OUTPATIENT
Start: 2018-09-06 | End: 2019-09-17 | Stop reason: SDUPTHER

## 2018-09-17 ENCOUNTER — ANTI-COAG VISIT (OUTPATIENT)
Dept: PHARMACY | Age: 73
End: 2018-09-17

## 2018-09-17 DIAGNOSIS — I48.0 PAROXYSMAL ATRIAL FIBRILLATION (HCC): ICD-10-CM

## 2018-09-17 DIAGNOSIS — Z95.2 S/P MITRAL VALVE REPLACEMENT: ICD-10-CM

## 2018-09-17 DIAGNOSIS — Z95.2 S/P AORTIC VALVE REPLACEMENT: ICD-10-CM

## 2018-09-17 LAB
INR BLD: 4
PROTIME: 47.9 SECONDS

## 2018-09-17 NOTE — PROGRESS NOTES
Mitchell Beach is a 72 y.o. y/o female with history of Afib, Aortic and Mitral Valve Porcine valve. She presents today for anticoagulation monitoring and adjustment. Pertinent PMH: The aortic and mitral valves are porcine and were replaced at the same time on March 9, 2010 by Dr. Emilia Oliva. Patient Reported Findings:  Yes     No  [x]   []       Patient verifies current dosing regimen as listed  []   [x]       S/S bleeding/bruising/swelling/SOB   []   [x]       Blood in urine or stool   []   [x]       Procedures scheduled in the future at this time  []   [x]       Missed Dose  []   [x]       Extra Dose  []   [x]       Change in medications  []   [x]       Change in health/diet/appetite. - She states that she has not had any greens recently  She has stated in the past that she does not think that she can regulate vegetables since she is dependent on what is served at the \"mission\" where she eats. Have discussed that this is the cause for fluctuating INR as patient normally eats spinach, broccoli, shahzad greens and salads with iceberg other lower vitamin K veggies. Continue to emphasize the need for consistency. []   [x]       Change in alcohol use  []   [x]       Change in activity  []   [x]       Hospital admission  []   [x]       Emergency department visit  []   [x]       Other complaints    Clinical Outcomes:  Yes     No  []   [x]       Major bleeding event  []   [x]       Thromboembolic event    Duration of warfarin Therapy: Indefinitely  INR Range:  2.0-3.0   Keep at 2.5-3.0 per the collaborative agreement from Dr. Juliette Bryan    INR 4.0 today. Hold dose tonight only then Decrease weekly dose to 2.5 mg on Friday only and 5 mg all other days(7.1% decrease)  Encouraged to maintain a consistency of vegetables/salads.   Recheck INR in 4 weeks on 10/1    Referring cardiologist is Dr. Mary Bradley  INR (no units)   Date Value   09/17/2018 4   08/20/2018 2.8   07/30/2018 3.2   07/16/2018 3.9

## 2018-09-17 NOTE — TELEPHONE ENCOUNTER
Warfarin prescription phoned into [facility] to [facility] [address] under Dr. Adithya Reyna  Warfarin 5mg mg tabs  Take 5mg daily  90 days   2 refills

## 2018-10-01 ENCOUNTER — ANTI-COAG VISIT (OUTPATIENT)
Dept: PHARMACY | Age: 73
End: 2018-10-01
Payer: MEDICARE

## 2018-10-01 DIAGNOSIS — Z95.2 S/P AORTIC VALVE REPLACEMENT: ICD-10-CM

## 2018-10-01 DIAGNOSIS — I48.0 PAROXYSMAL ATRIAL FIBRILLATION (HCC): ICD-10-CM

## 2018-10-01 DIAGNOSIS — Z95.2 S/P MITRAL VALVE REPLACEMENT: ICD-10-CM

## 2018-10-01 LAB — INTERNATIONAL NORMALIZATION RATIO, POC: 2

## 2018-10-01 PROCEDURE — 99211 OFF/OP EST MAY X REQ PHY/QHP: CPT

## 2018-10-01 PROCEDURE — 85610 PROTHROMBIN TIME: CPT

## 2018-10-01 RX ORDER — MULTIVIT-MIN/IRON/FOLIC ACID/K 18-600-40
1 CAPSULE ORAL DAILY
COMMUNITY
End: 2019-09-06 | Stop reason: ALTCHOICE

## 2018-10-29 ENCOUNTER — ANTI-COAG VISIT (OUTPATIENT)
Dept: PHARMACY | Age: 73
End: 2018-10-29
Payer: MEDICARE

## 2018-10-29 DIAGNOSIS — Z95.2 S/P MITRAL VALVE REPLACEMENT: ICD-10-CM

## 2018-10-29 DIAGNOSIS — I48.0 PAROXYSMAL ATRIAL FIBRILLATION (HCC): ICD-10-CM

## 2018-10-29 DIAGNOSIS — Z95.2 S/P AORTIC VALVE REPLACEMENT: ICD-10-CM

## 2018-10-29 LAB — INTERNATIONAL NORMALIZATION RATIO, POC: 2.6

## 2018-10-29 PROCEDURE — 85610 PROTHROMBIN TIME: CPT

## 2018-10-29 PROCEDURE — 99211 OFF/OP EST MAY X REQ PHY/QHP: CPT

## 2018-10-29 NOTE — PROGRESS NOTES
Lenton Route Ms. Beena Berger is a 72 y.o. y/o female with history of Afib, Aortic and Mitral Valve Porcine valve. She presents today for anticoagulation monitoring and adjustment. Pertinent PMH: The aortic and mitral valves are porcine and were replaced at the same time on March 9, 2010 by Dr. Romina Guevara. Patient Reported Findings:  Yes     No  [x]   []       Patient verifies current dosing regimen as listed  []   [x]       S/S bleeding/bruising/swelling/SOB   []   [x]       Blood in urine or stool   []   [x]       Procedures scheduled in the future at this time  []   [x]       Missed Dose  []   [x]       Extra Dose  [x]   [x]       Change in medications Added Tumeric since last visit which does not affect the INR   []   [x]       Change in health/diet/appetite. - No change. She normally eats spinach, broccoli, shahzad greens and salads with iceberg other lower vitamin K. veggies. Continue to emphasize the need for consistency. []   [x]       Change in alcohol use  []   [x]       Change in activity  []   [x]       Hospital admission  []   [x]       Emergency department visit  []   [x]       Other complaints    Clinical Outcomes:  Yes     No  []   [x]       Major bleeding event  []   [x]       Thromboembolic event    Duration of warfarin Therapy: Indefinitely  INR Range:  2.0-3.0   Keep at 2.5-3.0 per the collaborative agreement from Dr. Verenice Villa    INR 2.0 today. Continue same weekly dose to 2.5 mg on Friday only and 5 mg all other days  Encouraged to maintain a consistency of vegetables/salads.   Recheck INR in 4 weeks on 11/26    Referring cardiologist is Dr. Luis E Alvarez  INR (no units)   Date Value   10/01/2018 2   09/17/2018 4   08/20/2018 2.8   07/30/2018 3.2   07/16/2018 3.9

## 2018-11-06 ENCOUNTER — OFFICE VISIT (OUTPATIENT)
Dept: CARDIOLOGY CLINIC | Age: 73
End: 2018-11-06
Payer: MEDICARE

## 2018-11-06 VITALS
HEART RATE: 86 BPM | HEIGHT: 62 IN | WEIGHT: 195.2 LBS | SYSTOLIC BLOOD PRESSURE: 110 MMHG | DIASTOLIC BLOOD PRESSURE: 80 MMHG | OXYGEN SATURATION: 99 % | BODY MASS INDEX: 35.92 KG/M2

## 2018-11-06 DIAGNOSIS — I48.0 PAROXYSMAL ATRIAL FIBRILLATION (HCC): Primary | Chronic | ICD-10-CM

## 2018-11-06 DIAGNOSIS — Z95.2 S/P MITRAL VALVE REPLACEMENT: Chronic | ICD-10-CM

## 2018-11-06 DIAGNOSIS — Z95.2 S/P AORTIC VALVE REPLACEMENT: Chronic | ICD-10-CM

## 2018-11-06 DIAGNOSIS — E78.5 HYPERLIPIDEMIA, UNSPECIFIED HYPERLIPIDEMIA TYPE: Chronic | ICD-10-CM

## 2018-11-06 PROCEDURE — G8400 PT W/DXA NO RESULTS DOC: HCPCS | Performed by: INTERNAL MEDICINE

## 2018-11-06 PROCEDURE — 1101F PT FALLS ASSESS-DOCD LE1/YR: CPT | Performed by: INTERNAL MEDICINE

## 2018-11-06 PROCEDURE — G8427 DOCREV CUR MEDS BY ELIG CLIN: HCPCS | Performed by: INTERNAL MEDICINE

## 2018-11-06 PROCEDURE — 4040F PNEUMOC VAC/ADMIN/RCVD: CPT | Performed by: INTERNAL MEDICINE

## 2018-11-06 PROCEDURE — 99214 OFFICE O/P EST MOD 30 MIN: CPT | Performed by: INTERNAL MEDICINE

## 2018-11-06 PROCEDURE — 3017F COLORECTAL CA SCREEN DOC REV: CPT | Performed by: INTERNAL MEDICINE

## 2018-11-06 PROCEDURE — G8484 FLU IMMUNIZE NO ADMIN: HCPCS | Performed by: INTERNAL MEDICINE

## 2018-11-06 PROCEDURE — G8417 CALC BMI ABV UP PARAM F/U: HCPCS | Performed by: INTERNAL MEDICINE

## 2018-11-06 PROCEDURE — 1036F TOBACCO NON-USER: CPT | Performed by: INTERNAL MEDICINE

## 2018-11-06 PROCEDURE — 1123F ACP DISCUSS/DSCN MKR DOCD: CPT | Performed by: INTERNAL MEDICINE

## 2018-11-06 PROCEDURE — 1090F PRES/ABSN URINE INCON ASSESS: CPT | Performed by: INTERNAL MEDICINE

## 2018-11-06 NOTE — PROGRESS NOTES
mass index is 36.29 kg/m². Wt Readings from Last 3 Encounters:   11/06/18 195 lb 3.2 oz (88.5 kg)   06/21/18 189 lb (85.7 kg)   05/23/18 186 lb (84.4 kg)      BP Readings from Last 3 Encounters:   11/06/18 110/80   06/21/18 118/72   04/24/18 112/70      Constitutional and General Appearance:  appears stated age  Eyes - no xanthelasma  Respiratory:  · Normal excursion and expansion without use of accessory muscles  · Resp Auscultation: Normal breath sounds without dullness  Cardiovascular:  · The apical impulses not displaced  · Heart is regular rate and rhythm with normal S1, S2 (tissue valves)   · PMI is normal  · The carotid upstroke is normal, no bruit noted   · JVP is not elevated  · Peripheral pulses are symmetrical  · There is no clubbing, cyanosis of the extremities  · No edema  · No varicosities  · Femoral Arteries: 2+ and equal without bruits  · Pedal Pulses: 2+ and equal   Abdomen:  · No masses or tenderness  · Aorta not palpable  · Normal bowel sounds  Neurological/Psychiatric:  · Alert and oriented x3  · Moves all extremities well  · Exhibits normal gait balance and coordination      Assessment/Plan  1. Paroxysmal atrial fibrillation (HCC) -follows with Dr Stacy Carty, coumadin followed by INR clinic at [place]  5/16 Holter-- showed atrial flutter with AHR 93, few PVC's  9/17/12 Holter--Afib (100%), HR  ( avg 101) with PVCs. 2. S/P aortic valve replacement   Echo 4/17/14: EF 55%. reversal of E/A inflow velocities across the mitral valve suggesting impaired left ventricular relaxation. v A prosthetic mitral valve is well seated with maximum gradient of 15 mmHg and a mean gradient of 6 mmHg. Mild mitral regurgitation is present. The left atrium is dilated. A prosthetic aortic valve appears well seated with a maximum gradient of 24mmHg and a mean gradient of 15 mmHg. No evidence of aortic valve regurgitation. mild to moderate tricuspid regurgitation with RVSP estimated at 41mmHg.  Mild pulmonic

## 2018-11-26 ENCOUNTER — ANTI-COAG VISIT (OUTPATIENT)
Dept: PHARMACY | Age: 73
End: 2018-11-26
Payer: MEDICARE

## 2018-11-26 DIAGNOSIS — Z95.2 S/P MITRAL VALVE REPLACEMENT: ICD-10-CM

## 2018-11-26 DIAGNOSIS — Z95.2 S/P AORTIC VALVE REPLACEMENT: ICD-10-CM

## 2018-11-26 DIAGNOSIS — I48.0 PAROXYSMAL ATRIAL FIBRILLATION (HCC): ICD-10-CM

## 2018-11-26 LAB — INTERNATIONAL NORMALIZATION RATIO, POC: 4.6

## 2018-11-26 PROCEDURE — 99211 OFF/OP EST MAY X REQ PHY/QHP: CPT

## 2018-11-26 PROCEDURE — 85610 PROTHROMBIN TIME: CPT

## 2018-12-10 ENCOUNTER — ANTI-COAG VISIT (OUTPATIENT)
Dept: PHARMACY | Age: 73
End: 2018-12-10
Payer: MEDICARE

## 2018-12-10 DIAGNOSIS — Z95.2 S/P AORTIC VALVE REPLACEMENT: ICD-10-CM

## 2018-12-10 DIAGNOSIS — Z95.2 S/P MITRAL VALVE REPLACEMENT: ICD-10-CM

## 2018-12-10 DIAGNOSIS — I48.0 PAROXYSMAL ATRIAL FIBRILLATION (HCC): ICD-10-CM

## 2018-12-10 LAB — INTERNATIONAL NORMALIZATION RATIO, POC: 2.6

## 2018-12-10 PROCEDURE — 85610 PROTHROMBIN TIME: CPT

## 2018-12-10 PROCEDURE — 99211 OFF/OP EST MAY X REQ PHY/QHP: CPT

## 2019-01-07 ENCOUNTER — ANTI-COAG VISIT (OUTPATIENT)
Dept: PHARMACY | Age: 74
End: 2019-01-07
Payer: MEDICARE

## 2019-01-07 ENCOUNTER — HOSPITAL ENCOUNTER (OUTPATIENT)
Dept: WOMENS IMAGING | Age: 74
Discharge: HOME | End: 2019-01-07
Payer: MEDICARE

## 2019-01-07 DIAGNOSIS — Z95.2 S/P AORTIC VALVE REPLACEMENT: ICD-10-CM

## 2019-01-07 DIAGNOSIS — I48.0 PAROXYSMAL ATRIAL FIBRILLATION (HCC): ICD-10-CM

## 2019-01-07 DIAGNOSIS — Z95.2 S/P MITRAL VALVE REPLACEMENT: ICD-10-CM

## 2019-01-07 DIAGNOSIS — Z12.31 ENCOUNTER FOR SCREENING MAMMOGRAM FOR BREAST CANCER: ICD-10-CM

## 2019-01-07 LAB — INTERNATIONAL NORMALIZATION RATIO, POC: 4.2

## 2019-01-07 PROCEDURE — 85610 PROTHROMBIN TIME: CPT

## 2019-01-07 PROCEDURE — 99212 OFFICE O/P EST SF 10 MIN: CPT

## 2019-01-07 PROCEDURE — 77063 BREAST TOMOSYNTHESIS BI: CPT

## 2019-01-21 ENCOUNTER — HOSPITAL ENCOUNTER (OUTPATIENT)
Dept: ULTRASOUND IMAGING | Age: 74
Discharge: HOME | End: 2019-01-21
Payer: MEDICARE

## 2019-01-21 ENCOUNTER — HOSPITAL ENCOUNTER (OUTPATIENT)
Age: 74
Discharge: HOME | End: 2019-01-21
Payer: MEDICARE

## 2019-01-21 ENCOUNTER — TELEPHONE (OUTPATIENT)
Dept: INTERNAL MEDICINE CLINIC | Age: 74
End: 2019-01-21

## 2019-01-21 ENCOUNTER — ANTI-COAG VISIT (OUTPATIENT)
Dept: PHARMACY | Age: 74
End: 2019-01-21
Payer: MEDICARE

## 2019-01-21 ENCOUNTER — HOSPITAL ENCOUNTER (OUTPATIENT)
Dept: WOMENS IMAGING | Age: 74
Discharge: HOME | End: 2019-01-21
Payer: MEDICARE

## 2019-01-21 DIAGNOSIS — Z95.2 S/P MITRAL VALVE REPLACEMENT: ICD-10-CM

## 2019-01-21 DIAGNOSIS — I48.0 PAROXYSMAL ATRIAL FIBRILLATION (HCC): ICD-10-CM

## 2019-01-21 DIAGNOSIS — R92.8 ABNORMAL MAMMOGRAM: ICD-10-CM

## 2019-01-21 DIAGNOSIS — N64.9 BREAST LESION: Primary | ICD-10-CM

## 2019-01-21 DIAGNOSIS — Z95.2 S/P AORTIC VALVE REPLACEMENT: ICD-10-CM

## 2019-01-21 LAB
A/G RATIO: 1.3 (ref 1.1–2.2)
ALBUMIN SERPL-MCNC: 4.4 G/DL (ref 3.4–5)
ALP BLD-CCNC: 119 U/L (ref 40–129)
ALT SERPL-CCNC: 32 U/L (ref 10–40)
ANION GAP SERPL CALCULATED.3IONS-SCNC: 13 MMOL/L (ref 3–16)
AST SERPL-CCNC: 36 U/L (ref 15–37)
BILIRUB SERPL-MCNC: 0.3 MG/DL (ref 0–1)
BILIRUBIN URINE: NEGATIVE
BLOOD, URINE: ABNORMAL
BUN BLDV-MCNC: 16 MG/DL (ref 7–20)
CALCIUM SERPL-MCNC: 9.3 MG/DL (ref 8.3–10.6)
CHLORIDE BLD-SCNC: 102 MMOL/L (ref 99–110)
CHOLESTEROL, TOTAL: 132 MG/DL (ref 0–199)
CLARITY: CLEAR
CO2: 25 MMOL/L (ref 21–32)
COLOR: YELLOW
CREAT SERPL-MCNC: 0.9 MG/DL (ref 0.6–1.2)
EPITHELIAL CELLS, UA: 1 /HPF (ref 0–5)
GFR AFRICAN AMERICAN: >60
GFR NON-AFRICAN AMERICAN: >60
GLOBULIN: 3.5 G/DL
GLUCOSE BLD-MCNC: 110 MG/DL (ref 70–99)
GLUCOSE URINE: NEGATIVE MG/DL
HCT VFR BLD CALC: 39.3 % (ref 36–48)
HDLC SERPL-MCNC: 47 MG/DL (ref 40–60)
HEMOGLOBIN: 12.7 G/DL (ref 12–16)
HYALINE CASTS: 1 /LPF (ref 0–8)
INTERNATIONAL NORMALIZATION RATIO, POC: 1.9
KETONES, URINE: NEGATIVE MG/DL
LDL CHOLESTEROL CALCULATED: 68 MG/DL
LEUKOCYTE ESTERASE, URINE: ABNORMAL
MCH RBC QN AUTO: 28.4 PG (ref 26–34)
MCHC RBC AUTO-ENTMCNC: 32.3 G/DL (ref 31–36)
MCV RBC AUTO: 88 FL (ref 80–100)
MICROSCOPIC EXAMINATION: YES
NITRITE, URINE: NEGATIVE
PDW BLD-RTO: 14.1 % (ref 12.4–15.4)
PH UA: 6
PLATELET # BLD: 216 K/UL (ref 135–450)
PMV BLD AUTO: 9.8 FL (ref 5–10.5)
POTASSIUM SERPL-SCNC: 4.2 MMOL/L (ref 3.5–5.1)
PROTEIN UA: NEGATIVE MG/DL
RBC # BLD: 4.47 M/UL (ref 4–5.2)
RBC UA: 3 /HPF (ref 0–4)
SODIUM BLD-SCNC: 140 MMOL/L (ref 136–145)
SPECIFIC GRAVITY UA: 1.01
TOTAL PROTEIN: 7.9 G/DL (ref 6.4–8.2)
TRIGL SERPL-MCNC: 86 MG/DL (ref 0–150)
TSH SERPL DL<=0.05 MIU/L-ACNC: 9.01 UIU/ML (ref 0.27–4.2)
URINE TYPE: ABNORMAL
UROBILINOGEN, URINE: 0.2 E.U./DL
VITAMIN D 25-HYDROXY: 35.9 NG/ML
VLDLC SERPL CALC-MCNC: 17 MG/DL
WBC # BLD: 5.2 K/UL (ref 4–11)
WBC UA: 10 /HPF (ref 0–5)

## 2019-01-21 PROCEDURE — 80061 LIPID PANEL: CPT

## 2019-01-21 PROCEDURE — G0279 TOMOSYNTHESIS, MAMMO: HCPCS

## 2019-01-21 PROCEDURE — 84443 ASSAY THYROID STIM HORMONE: CPT

## 2019-01-21 PROCEDURE — 99211 OFF/OP EST MAY X REQ PHY/QHP: CPT

## 2019-01-21 PROCEDURE — 36415 COLL VENOUS BLD VENIPUNCTURE: CPT

## 2019-01-21 PROCEDURE — 76642 ULTRASOUND BREAST LIMITED: CPT

## 2019-01-21 PROCEDURE — 81001 URINALYSIS AUTO W/SCOPE: CPT

## 2019-01-21 PROCEDURE — 85027 COMPLETE CBC AUTOMATED: CPT

## 2019-01-21 PROCEDURE — 80053 COMPREHEN METABOLIC PANEL: CPT

## 2019-01-21 PROCEDURE — 85610 PROTHROMBIN TIME: CPT

## 2019-01-21 PROCEDURE — 82306 VITAMIN D 25 HYDROXY: CPT

## 2019-01-22 ENCOUNTER — HOSPITAL ENCOUNTER (OUTPATIENT)
Dept: ULTRASOUND IMAGING | Age: 74
Discharge: HOME | End: 2019-01-22
Payer: MEDICARE

## 2019-01-22 ENCOUNTER — HOSPITAL ENCOUNTER (OUTPATIENT)
Dept: WOMENS IMAGING | Age: 74
Discharge: HOME | End: 2019-01-22
Payer: MEDICARE

## 2019-01-22 DIAGNOSIS — N64.9 BREAST LESION: ICD-10-CM

## 2019-01-22 DIAGNOSIS — R92.8 ABNORMAL MAMMOGRAM: ICD-10-CM

## 2019-01-22 PROCEDURE — 88305 TISSUE EXAM BY PATHOLOGIST: CPT

## 2019-01-22 PROCEDURE — 77065 DX MAMMO INCL CAD UNI: CPT

## 2019-01-22 PROCEDURE — G0279 TOMOSYNTHESIS, MAMMO: HCPCS

## 2019-01-22 PROCEDURE — 2709999900 US BREAST BIOPSY W LOC DEVICE 1ST LESION RIGHT

## 2019-01-22 PROCEDURE — 2500000003 HC RX 250 WO HCPCS: Performed by: INTERNAL MEDICINE

## 2019-01-22 RX ORDER — LIDOCAINE HYDROCHLORIDE AND EPINEPHRINE 10; 10 MG/ML; UG/ML
20 INJECTION, SOLUTION INFILTRATION; PERINEURAL ONCE
Status: COMPLETED | OUTPATIENT
Start: 2019-01-22 | End: 2019-01-22

## 2019-01-22 RX ORDER — LIDOCAINE HYDROCHLORIDE 10 MG/ML
5 INJECTION, SOLUTION EPIDURAL; INFILTRATION; INTRACAUDAL; PERINEURAL ONCE
Status: COMPLETED | OUTPATIENT
Start: 2019-01-22 | End: 2019-01-22

## 2019-01-22 RX ADMIN — LIDOCAINE HYDROCHLORIDE AND EPINEPHRINE 5 ML: 10; 10 INJECTION, SOLUTION INFILTRATION; PERINEURAL at 10:51

## 2019-01-22 RX ADMIN — LIDOCAINE HYDROCHLORIDE 5 ML: 10 INJECTION, SOLUTION EPIDURAL; INFILTRATION; INTRACAUDAL; PERINEURAL at 10:49

## 2019-01-22 ASSESSMENT — PAIN SCALES - GENERAL: PAINLEVEL_OUTOF10: 0

## 2019-01-30 ENCOUNTER — TELEPHONE (OUTPATIENT)
Dept: SURGERY | Age: 74
End: 2019-01-30

## 2019-02-04 ENCOUNTER — OFFICE VISIT (OUTPATIENT)
Dept: SURGERY | Age: 74
End: 2019-02-04
Payer: MEDICARE

## 2019-02-04 ENCOUNTER — ANTI-COAG VISIT (OUTPATIENT)
Dept: PHARMACY | Age: 74
End: 2019-02-04
Payer: MEDICARE

## 2019-02-04 VITALS
DIASTOLIC BLOOD PRESSURE: 74 MMHG | SYSTOLIC BLOOD PRESSURE: 119 MMHG | BODY MASS INDEX: 36.36 KG/M2 | HEIGHT: 62 IN | WEIGHT: 197.6 LBS | TEMPERATURE: 96.4 F | HEART RATE: 67 BPM

## 2019-02-04 DIAGNOSIS — Z95.2 S/P MITRAL VALVE REPLACEMENT: ICD-10-CM

## 2019-02-04 DIAGNOSIS — D24.1 PAPILLOMA OF RIGHT BREAST: Primary | ICD-10-CM

## 2019-02-04 DIAGNOSIS — Z95.2 S/P AORTIC VALVE REPLACEMENT: ICD-10-CM

## 2019-02-04 DIAGNOSIS — I48.0 PAROXYSMAL ATRIAL FIBRILLATION (HCC): ICD-10-CM

## 2019-02-04 LAB — INTERNATIONAL NORMALIZATION RATIO, POC: 1.9

## 2019-02-04 PROCEDURE — G8417 CALC BMI ABV UP PARAM F/U: HCPCS | Performed by: SURGERY

## 2019-02-04 PROCEDURE — 99211 OFF/OP EST MAY X REQ PHY/QHP: CPT

## 2019-02-04 PROCEDURE — 1101F PT FALLS ASSESS-DOCD LE1/YR: CPT | Performed by: SURGERY

## 2019-02-04 PROCEDURE — 99203 OFFICE O/P NEW LOW 30 MIN: CPT | Performed by: SURGERY

## 2019-02-04 PROCEDURE — G8484 FLU IMMUNIZE NO ADMIN: HCPCS | Performed by: SURGERY

## 2019-02-04 PROCEDURE — G8400 PT W/DXA NO RESULTS DOC: HCPCS | Performed by: SURGERY

## 2019-02-04 PROCEDURE — 1036F TOBACCO NON-USER: CPT | Performed by: SURGERY

## 2019-02-04 PROCEDURE — 1090F PRES/ABSN URINE INCON ASSESS: CPT | Performed by: SURGERY

## 2019-02-04 PROCEDURE — 3017F COLORECTAL CA SCREEN DOC REV: CPT | Performed by: SURGERY

## 2019-02-04 PROCEDURE — 1123F ACP DISCUSS/DSCN MKR DOCD: CPT | Performed by: SURGERY

## 2019-02-04 PROCEDURE — 4040F PNEUMOC VAC/ADMIN/RCVD: CPT | Performed by: SURGERY

## 2019-02-04 PROCEDURE — 85610 PROTHROMBIN TIME: CPT

## 2019-02-04 PROCEDURE — G8427 DOCREV CUR MEDS BY ELIG CLIN: HCPCS | Performed by: SURGERY

## 2019-02-04 ASSESSMENT — ENCOUNTER SYMPTOMS
EYES NEGATIVE: 1
RESPIRATORY NEGATIVE: 1
GASTROINTESTINAL NEGATIVE: 1

## 2019-02-05 ENCOUNTER — PREP FOR PROCEDURE (OUTPATIENT)
Dept: SURGERY | Age: 74
End: 2019-02-05

## 2019-02-05 RX ORDER — SODIUM CHLORIDE 0.9 % (FLUSH) 0.9 %
10 SYRINGE (ML) INJECTION PRN
Status: CANCELLED | OUTPATIENT
Start: 2019-02-05

## 2019-02-05 RX ORDER — SODIUM CHLORIDE 0.9 % (FLUSH) 0.9 %
10 SYRINGE (ML) INJECTION EVERY 12 HOURS SCHEDULED
Status: CANCELLED | OUTPATIENT
Start: 2019-02-05

## 2019-02-05 RX ORDER — LIDOCAINE HYDROCHLORIDE 10 MG/ML
0.1 INJECTION, SOLUTION EPIDURAL; INFILTRATION; INTRACAUDAL; PERINEURAL
Status: CANCELLED | OUTPATIENT
Start: 2019-02-05 | End: 2019-02-05

## 2019-02-05 RX ORDER — SODIUM CHLORIDE, SODIUM LACTATE, POTASSIUM CHLORIDE, CALCIUM CHLORIDE 600; 310; 30; 20 MG/100ML; MG/100ML; MG/100ML; MG/100ML
INJECTION, SOLUTION INTRAVENOUS CONTINUOUS
Status: CANCELLED | OUTPATIENT
Start: 2019-02-05

## 2019-02-07 ENCOUNTER — TELEPHONE (OUTPATIENT)
Dept: PHARMACY | Age: 74
End: 2019-02-07

## 2019-02-08 ENCOUNTER — TELEPHONE (OUTPATIENT)
Dept: CARDIOLOGY CLINIC | Age: 74
End: 2019-02-08

## 2019-02-14 RX ORDER — ACETAMINOPHEN 160 MG
TABLET,DISINTEGRATING ORAL
COMMUNITY

## 2019-02-20 ENCOUNTER — TELEPHONE (OUTPATIENT)
Dept: PHARMACY | Age: 74
End: 2019-02-20

## 2019-02-21 ENCOUNTER — TELEPHONE (OUTPATIENT)
Dept: PHARMACY | Age: 74
End: 2019-02-21

## 2019-02-27 ENCOUNTER — APPOINTMENT (OUTPATIENT)
Dept: WOMENS IMAGING | Age: 74
End: 2019-02-27
Attending: SURGERY
Payer: MEDICARE

## 2019-02-27 ENCOUNTER — ANESTHESIA (OUTPATIENT)
Dept: OPERATING ROOM | Age: 74
End: 2019-02-27
Payer: MEDICARE

## 2019-02-27 ENCOUNTER — HOSPITAL ENCOUNTER (OUTPATIENT)
Dept: ULTRASOUND IMAGING | Age: 74
Discharge: HOME | End: 2019-02-27
Payer: MEDICARE

## 2019-02-27 ENCOUNTER — ANESTHESIA EVENT (OUTPATIENT)
Dept: OPERATING ROOM | Age: 74
End: 2019-02-27
Payer: MEDICARE

## 2019-02-27 ENCOUNTER — HOSPITAL ENCOUNTER (OUTPATIENT)
Age: 74
Setting detail: OUTPATIENT SURGERY
Discharge: HOME | End: 2019-02-27
Attending: SURGERY | Admitting: SURGERY
Payer: MEDICARE

## 2019-02-27 VITALS
DIASTOLIC BLOOD PRESSURE: 62 MMHG | HEART RATE: 54 BPM | RESPIRATION RATE: 26 BRPM | TEMPERATURE: 97 F | SYSTOLIC BLOOD PRESSURE: 116 MMHG | WEIGHT: 197 LBS | HEIGHT: 61 IN | BODY MASS INDEX: 37.19 KG/M2 | OXYGEN SATURATION: 100 %

## 2019-02-27 VITALS
SYSTOLIC BLOOD PRESSURE: 125 MMHG | DIASTOLIC BLOOD PRESSURE: 56 MMHG | RESPIRATION RATE: 13 BRPM | OXYGEN SATURATION: 98 %

## 2019-02-27 DIAGNOSIS — G89.18 POSTOPERATIVE PAIN: Primary | ICD-10-CM

## 2019-02-27 DIAGNOSIS — R92.8 ABNORMAL MAMMOGRAM: ICD-10-CM

## 2019-02-27 DIAGNOSIS — D24.1 PAPILLOMA OF RIGHT BREAST: ICD-10-CM

## 2019-02-27 LAB
ABO/RH: NORMAL
ANION GAP SERPL CALCULATED.3IONS-SCNC: 11 MMOL/L (ref 3–16)
ANTIBODY SCREEN: NORMAL
BUN BLDV-MCNC: 10 MG/DL (ref 7–20)
CALCIUM SERPL-MCNC: 9.1 MG/DL (ref 8.3–10.6)
CHLORIDE BLD-SCNC: 109 MMOL/L (ref 99–110)
CO2: 25 MMOL/L (ref 21–32)
CREAT SERPL-MCNC: 0.8 MG/DL (ref 0.6–1.2)
GFR AFRICAN AMERICAN: >60
GFR NON-AFRICAN AMERICAN: >60
GLUCOSE BLD-MCNC: 106 MG/DL (ref 70–99)
HCT VFR BLD CALC: 36.6 % (ref 36–48)
HEMOGLOBIN: 11.8 G/DL (ref 12–16)
INR BLD: 1.11 (ref 0.86–1.14)
MCH RBC QN AUTO: 28.1 PG (ref 26–34)
MCHC RBC AUTO-ENTMCNC: 32.2 G/DL (ref 31–36)
MCV RBC AUTO: 87.5 FL (ref 80–100)
PDW BLD-RTO: 14.3 % (ref 12.4–15.4)
PLATELET # BLD: 177 K/UL (ref 135–450)
PMV BLD AUTO: 9.3 FL (ref 5–10.5)
POTASSIUM SERPL-SCNC: 4 MMOL/L (ref 3.5–5.1)
PROTHROMBIN TIME: 12.6 SEC (ref 9.8–13)
RBC # BLD: 4.19 M/UL (ref 4–5.2)
SODIUM BLD-SCNC: 145 MMOL/L (ref 136–145)
WBC # BLD: 5.7 K/UL (ref 4–11)

## 2019-02-27 PROCEDURE — 86901 BLOOD TYPING SEROLOGIC RH(D): CPT

## 2019-02-27 PROCEDURE — 2580000003 HC RX 258: Performed by: SURGERY

## 2019-02-27 PROCEDURE — 3600000002 HC SURGERY LEVEL 2 BASE: Performed by: SURGERY

## 2019-02-27 PROCEDURE — 2709999900 HC NON-CHARGEABLE SUPPLY: Performed by: SURGERY

## 2019-02-27 PROCEDURE — 7100000010 HC PHASE II RECOVERY - FIRST 15 MIN: Performed by: SURGERY

## 2019-02-27 PROCEDURE — 14001 TIS TRNFR TRUNK 10.1-30SQCM: CPT | Performed by: SURGERY

## 2019-02-27 PROCEDURE — 76098 X-RAY EXAM SURGICAL SPECIMEN: CPT

## 2019-02-27 PROCEDURE — 6360000002 HC RX W HCPCS: Performed by: NURSE ANESTHETIST, CERTIFIED REGISTERED

## 2019-02-27 PROCEDURE — 2580000003 HC RX 258: Performed by: NURSE ANESTHETIST, CERTIFIED REGISTERED

## 2019-02-27 PROCEDURE — 3700000000 HC ANESTHESIA ATTENDED CARE: Performed by: SURGERY

## 2019-02-27 PROCEDURE — 88307 TISSUE EXAM BY PATHOLOGIST: CPT

## 2019-02-27 PROCEDURE — 86900 BLOOD TYPING SEROLOGIC ABO: CPT

## 2019-02-27 PROCEDURE — 6360000002 HC RX W HCPCS: Performed by: SURGERY

## 2019-02-27 PROCEDURE — 85027 COMPLETE CBC AUTOMATED: CPT

## 2019-02-27 PROCEDURE — 7100000011 HC PHASE II RECOVERY - ADDTL 15 MIN: Performed by: SURGERY

## 2019-02-27 PROCEDURE — 7100000001 HC PACU RECOVERY - ADDTL 15 MIN: Performed by: SURGERY

## 2019-02-27 PROCEDURE — 2500000003 HC RX 250 WO HCPCS: Performed by: SURGERY

## 2019-02-27 PROCEDURE — 2709999900 US PLACE BREAST LOC DEVICE 1ST LESION RIGHT

## 2019-02-27 PROCEDURE — 77065 DX MAMMO INCL CAD UNI: CPT

## 2019-02-27 PROCEDURE — 86850 RBC ANTIBODY SCREEN: CPT

## 2019-02-27 PROCEDURE — 7100000000 HC PACU RECOVERY - FIRST 15 MIN: Performed by: SURGERY

## 2019-02-27 PROCEDURE — 19125 EXCISION BREAST LESION: CPT | Performed by: SURGERY

## 2019-02-27 PROCEDURE — 80048 BASIC METABOLIC PNL TOTAL CA: CPT

## 2019-02-27 PROCEDURE — 3700000001 HC ADD 15 MINUTES (ANESTHESIA): Performed by: SURGERY

## 2019-02-27 PROCEDURE — 3600000012 HC SURGERY LEVEL 2 ADDTL 15MIN: Performed by: SURGERY

## 2019-02-27 PROCEDURE — 85610 PROTHROMBIN TIME: CPT

## 2019-02-27 RX ORDER — OXYCODONE HYDROCHLORIDE AND ACETAMINOPHEN 5; 325 MG/1; MG/1
1 TABLET ORAL PRN
Status: DISCONTINUED | OUTPATIENT
Start: 2019-02-27 | End: 2019-02-27 | Stop reason: HOSPADM

## 2019-02-27 RX ORDER — PROPOFOL 10 MG/ML
INJECTION, EMULSION INTRAVENOUS PRN
Status: DISCONTINUED | OUTPATIENT
Start: 2019-02-27 | End: 2019-02-27 | Stop reason: SDUPTHER

## 2019-02-27 RX ORDER — ONDANSETRON 2 MG/ML
INJECTION INTRAMUSCULAR; INTRAVENOUS PRN
Status: DISCONTINUED | OUTPATIENT
Start: 2019-02-27 | End: 2019-02-27 | Stop reason: SDUPTHER

## 2019-02-27 RX ORDER — SODIUM CHLORIDE 9 MG/ML
INJECTION, SOLUTION INTRAVENOUS CONTINUOUS PRN
Status: DISCONTINUED | OUTPATIENT
Start: 2019-02-27 | End: 2019-02-27 | Stop reason: SDUPTHER

## 2019-02-27 RX ORDER — ONDANSETRON 2 MG/ML
4 INJECTION INTRAMUSCULAR; INTRAVENOUS
Status: DISCONTINUED | OUTPATIENT
Start: 2019-02-27 | End: 2019-02-27 | Stop reason: HOSPADM

## 2019-02-27 RX ORDER — SUCCINYLCHOLINE CHLORIDE 20 MG/ML
INJECTION INTRAMUSCULAR; INTRAVENOUS PRN
Status: DISCONTINUED | OUTPATIENT
Start: 2019-02-27 | End: 2019-02-27 | Stop reason: SDUPTHER

## 2019-02-27 RX ORDER — SODIUM CHLORIDE 0.9 % (FLUSH) 0.9 %
10 SYRINGE (ML) INJECTION PRN
Status: DISCONTINUED | OUTPATIENT
Start: 2019-02-27 | End: 2019-02-27 | Stop reason: HOSPADM

## 2019-02-27 RX ORDER — FENTANYL CITRATE 50 UG/ML
50 INJECTION, SOLUTION INTRAMUSCULAR; INTRAVENOUS EVERY 5 MIN PRN
Status: DISCONTINUED | OUTPATIENT
Start: 2019-02-27 | End: 2019-02-27 | Stop reason: HOSPADM

## 2019-02-27 RX ORDER — LIDOCAINE HYDROCHLORIDE 10 MG/ML
5 INJECTION, SOLUTION EPIDURAL; INFILTRATION; INTRACAUDAL; PERINEURAL ONCE
Status: COMPLETED | OUTPATIENT
Start: 2019-02-27 | End: 2019-02-27

## 2019-02-27 RX ORDER — MORPHINE SULFATE 10 MG/ML
1 INJECTION, SOLUTION INTRAMUSCULAR; INTRAVENOUS EVERY 5 MIN PRN
Status: DISCONTINUED | OUTPATIENT
Start: 2019-02-27 | End: 2019-02-27 | Stop reason: HOSPADM

## 2019-02-27 RX ORDER — SODIUM CHLORIDE, SODIUM LACTATE, POTASSIUM CHLORIDE, CALCIUM CHLORIDE 600; 310; 30; 20 MG/100ML; MG/100ML; MG/100ML; MG/100ML
INJECTION, SOLUTION INTRAVENOUS CONTINUOUS
Status: DISCONTINUED | OUTPATIENT
Start: 2019-02-27 | End: 2019-02-27 | Stop reason: HOSPADM

## 2019-02-27 RX ORDER — SODIUM CHLORIDE 0.9 % (FLUSH) 0.9 %
10 SYRINGE (ML) INJECTION EVERY 12 HOURS SCHEDULED
Status: DISCONTINUED | OUTPATIENT
Start: 2019-02-27 | End: 2019-02-27 | Stop reason: HOSPADM

## 2019-02-27 RX ORDER — MAGNESIUM HYDROXIDE 1200 MG/15ML
LIQUID ORAL CONTINUOUS PRN
Status: COMPLETED | OUTPATIENT
Start: 2019-02-27 | End: 2019-02-27

## 2019-02-27 RX ORDER — FENTANYL CITRATE 50 UG/ML
INJECTION, SOLUTION INTRAMUSCULAR; INTRAVENOUS PRN
Status: DISCONTINUED | OUTPATIENT
Start: 2019-02-27 | End: 2019-02-27 | Stop reason: SDUPTHER

## 2019-02-27 RX ORDER — OXYCODONE HYDROCHLORIDE AND ACETAMINOPHEN 5; 325 MG/1; MG/1
2 TABLET ORAL PRN
Status: DISCONTINUED | OUTPATIENT
Start: 2019-02-27 | End: 2019-02-27 | Stop reason: HOSPADM

## 2019-02-27 RX ORDER — MEPERIDINE HYDROCHLORIDE 25 MG/ML
12.5 INJECTION INTRAMUSCULAR; INTRAVENOUS; SUBCUTANEOUS EVERY 5 MIN PRN
Status: DISCONTINUED | OUTPATIENT
Start: 2019-02-27 | End: 2019-02-27 | Stop reason: HOSPADM

## 2019-02-27 RX ORDER — FENTANYL CITRATE 50 UG/ML
25 INJECTION, SOLUTION INTRAMUSCULAR; INTRAVENOUS EVERY 5 MIN PRN
Status: DISCONTINUED | OUTPATIENT
Start: 2019-02-27 | End: 2019-02-27 | Stop reason: HOSPADM

## 2019-02-27 RX ORDER — CLINDAMYCIN PHOSPHATE 900 MG/50ML
900 INJECTION INTRAVENOUS
Status: DISCONTINUED | OUTPATIENT
Start: 2019-02-27 | End: 2019-02-27 | Stop reason: HOSPADM

## 2019-02-27 RX ORDER — BUPIVACAINE HYDROCHLORIDE AND EPINEPHRINE 2.5; 5 MG/ML; UG/ML
INJECTION, SOLUTION EPIDURAL; INFILTRATION; INTRACAUDAL; PERINEURAL
Status: COMPLETED | OUTPATIENT
Start: 2019-02-27 | End: 2019-02-27

## 2019-02-27 RX ORDER — DIPHENHYDRAMINE HYDROCHLORIDE 50 MG/ML
12.5 INJECTION INTRAMUSCULAR; INTRAVENOUS
Status: DISCONTINUED | OUTPATIENT
Start: 2019-02-27 | End: 2019-02-27 | Stop reason: HOSPADM

## 2019-02-27 RX ORDER — LABETALOL HYDROCHLORIDE 5 MG/ML
5 INJECTION, SOLUTION INTRAVENOUS EVERY 10 MIN PRN
Status: DISCONTINUED | OUTPATIENT
Start: 2019-02-27 | End: 2019-02-27 | Stop reason: HOSPADM

## 2019-02-27 RX ORDER — CEFAZOLIN SODIUM 2 G/100ML
2 INJECTION, SOLUTION INTRAVENOUS
Status: COMPLETED | OUTPATIENT
Start: 2019-02-27 | End: 2019-02-27

## 2019-02-27 RX ORDER — LIDOCAINE HYDROCHLORIDE 10 MG/ML
0.1 INJECTION, SOLUTION EPIDURAL; INFILTRATION; INTRACAUDAL; PERINEURAL
Status: DISCONTINUED | OUTPATIENT
Start: 2019-02-27 | End: 2019-02-27 | Stop reason: HOSPADM

## 2019-02-27 RX ORDER — HYDROCODONE BITARTRATE AND ACETAMINOPHEN 5; 325 MG/1; MG/1
1 TABLET ORAL EVERY 4 HOURS PRN
Qty: 42 TABLET | Refills: 0 | Status: SHIPPED | OUTPATIENT
Start: 2019-02-27 | End: 2019-03-06

## 2019-02-27 RX ORDER — DEXAMETHASONE SODIUM PHOSPHATE 4 MG/ML
INJECTION, SOLUTION INTRA-ARTICULAR; INTRALESIONAL; INTRAMUSCULAR; INTRAVENOUS; SOFT TISSUE PRN
Status: DISCONTINUED | OUTPATIENT
Start: 2019-02-27 | End: 2019-02-27 | Stop reason: SDUPTHER

## 2019-02-27 RX ADMIN — LIDOCAINE HYDROCHLORIDE 5 ML: 10 INJECTION, SOLUTION EPIDURAL; INFILTRATION; INTRACAUDAL; PERINEURAL at 09:50

## 2019-02-27 RX ADMIN — DEXAMETHASONE SODIUM PHOSPHATE 4 MG: 4 INJECTION, SOLUTION INTRAMUSCULAR; INTRAVENOUS at 12:29

## 2019-02-27 RX ADMIN — SODIUM CHLORIDE: 9 INJECTION, SOLUTION INTRAVENOUS at 13:25

## 2019-02-27 RX ADMIN — PHENYLEPHRINE HYDROCHLORIDE 100 MCG: 10 INJECTION INTRAVENOUS at 12:38

## 2019-02-27 RX ADMIN — PROPOFOL 150 MG: 10 INJECTION, EMULSION INTRAVENOUS at 12:20

## 2019-02-27 RX ADMIN — ONDANSETRON 4 MG: 2 INJECTION INTRAMUSCULAR; INTRAVENOUS at 12:29

## 2019-02-27 RX ADMIN — FENTANYL CITRATE 100 MCG: 50 INJECTION, SOLUTION INTRAMUSCULAR; INTRAVENOUS at 12:17

## 2019-02-27 RX ADMIN — CEFAZOLIN SODIUM 2 G: 2 INJECTION, SOLUTION INTRAVENOUS at 12:10

## 2019-02-27 RX ADMIN — SUCCINYLCHOLINE CHLORIDE 100 MG: 20 INJECTION, SOLUTION INTRAMUSCULAR; INTRAVENOUS at 12:21

## 2019-02-27 RX ADMIN — SODIUM CHLORIDE: 9 INJECTION, SOLUTION INTRAVENOUS at 11:56

## 2019-02-27 RX ADMIN — SODIUM CHLORIDE, POTASSIUM CHLORIDE, SODIUM LACTATE AND CALCIUM CHLORIDE: 600; 310; 30; 20 INJECTION, SOLUTION INTRAVENOUS at 11:30

## 2019-02-27 RX ADMIN — PHENYLEPHRINE HYDROCHLORIDE 100 MCG: 10 INJECTION INTRAVENOUS at 12:49

## 2019-02-27 ASSESSMENT — PULMONARY FUNCTION TESTS
PIF_VALUE: 3
PIF_VALUE: 1
PIF_VALUE: 1
PIF_VALUE: 24
PIF_VALUE: 7
PIF_VALUE: 24
PIF_VALUE: 24
PIF_VALUE: 23
PIF_VALUE: 6
PIF_VALUE: 25
PIF_VALUE: 25
PIF_VALUE: 24
PIF_VALUE: 2
PIF_VALUE: 24
PIF_VALUE: 24
PIF_VALUE: 1
PIF_VALUE: 24
PIF_VALUE: 24
PIF_VALUE: 20
PIF_VALUE: 23
PIF_VALUE: 24
PIF_VALUE: 4
PIF_VALUE: 23
PIF_VALUE: 1
PIF_VALUE: 24
PIF_VALUE: 2
PIF_VALUE: 24
PIF_VALUE: 25
PIF_VALUE: 2
PIF_VALUE: 24
PIF_VALUE: 2
PIF_VALUE: 3
PIF_VALUE: 25
PIF_VALUE: 1
PIF_VALUE: 24
PIF_VALUE: 2
PIF_VALUE: 25
PIF_VALUE: 24
PIF_VALUE: 7
PIF_VALUE: 23
PIF_VALUE: 11
PIF_VALUE: 2
PIF_VALUE: 3
PIF_VALUE: 24
PIF_VALUE: 5
PIF_VALUE: 15
PIF_VALUE: 25
PIF_VALUE: 1
PIF_VALUE: 24
PIF_VALUE: 0
PIF_VALUE: 24
PIF_VALUE: 25
PIF_VALUE: 6
PIF_VALUE: 24
PIF_VALUE: 24
PIF_VALUE: 25
PIF_VALUE: 4
PIF_VALUE: 1

## 2019-02-27 ASSESSMENT — PAIN - FUNCTIONAL ASSESSMENT: PAIN_FUNCTIONAL_ASSESSMENT: 0-10

## 2019-03-04 ENCOUNTER — ANTI-COAG VISIT (OUTPATIENT)
Dept: PHARMACY | Age: 74
End: 2019-03-04
Payer: MEDICARE

## 2019-03-04 DIAGNOSIS — Z95.2 S/P MITRAL VALVE REPLACEMENT: ICD-10-CM

## 2019-03-04 DIAGNOSIS — Z95.2 S/P AORTIC VALVE REPLACEMENT: ICD-10-CM

## 2019-03-04 DIAGNOSIS — I48.0 PAROXYSMAL ATRIAL FIBRILLATION (HCC): ICD-10-CM

## 2019-03-04 LAB — INTERNATIONAL NORMALIZATION RATIO, POC: 1.7

## 2019-03-04 PROCEDURE — 85610 PROTHROMBIN TIME: CPT

## 2019-03-04 PROCEDURE — 99211 OFF/OP EST MAY X REQ PHY/QHP: CPT

## 2019-03-18 ENCOUNTER — ANTI-COAG VISIT (OUTPATIENT)
Dept: PHARMACY | Age: 74
End: 2019-03-18
Payer: MEDICARE

## 2019-03-18 ENCOUNTER — OFFICE VISIT (OUTPATIENT)
Dept: SURGERY | Age: 74
End: 2019-03-18

## 2019-03-18 VITALS
DIASTOLIC BLOOD PRESSURE: 61 MMHG | TEMPERATURE: 96.6 F | WEIGHT: 195 LBS | SYSTOLIC BLOOD PRESSURE: 119 MMHG | BODY MASS INDEX: 36.84 KG/M2 | OXYGEN SATURATION: 100 % | HEART RATE: 63 BPM

## 2019-03-18 DIAGNOSIS — Z95.2 S/P AORTIC VALVE REPLACEMENT: ICD-10-CM

## 2019-03-18 DIAGNOSIS — I48.0 PAROXYSMAL ATRIAL FIBRILLATION (HCC): ICD-10-CM

## 2019-03-18 DIAGNOSIS — Z95.2 S/P MITRAL VALVE REPLACEMENT: ICD-10-CM

## 2019-03-18 DIAGNOSIS — D24.1 PAPILLOMA OF RIGHT BREAST: Primary | ICD-10-CM

## 2019-03-18 LAB — INTERNATIONAL NORMALIZATION RATIO, POC: 1.8

## 2019-03-18 PROCEDURE — 99211 OFF/OP EST MAY X REQ PHY/QHP: CPT

## 2019-03-18 PROCEDURE — 99024 POSTOP FOLLOW-UP VISIT: CPT | Performed by: SURGERY

## 2019-03-18 PROCEDURE — 85610 PROTHROMBIN TIME: CPT

## 2019-04-01 ENCOUNTER — ANTI-COAG VISIT (OUTPATIENT)
Dept: PHARMACY | Age: 74
End: 2019-04-01
Payer: MEDICARE

## 2019-04-01 DIAGNOSIS — I48.0 PAROXYSMAL ATRIAL FIBRILLATION (HCC): ICD-10-CM

## 2019-04-01 DIAGNOSIS — Z95.2 S/P MITRAL VALVE REPLACEMENT: ICD-10-CM

## 2019-04-01 DIAGNOSIS — Z95.2 S/P AORTIC VALVE REPLACEMENT: ICD-10-CM

## 2019-04-01 LAB — INTERNATIONAL NORMALIZATION RATIO, POC: 2.9

## 2019-04-01 PROCEDURE — 99211 OFF/OP EST MAY X REQ PHY/QHP: CPT

## 2019-04-01 PROCEDURE — 85610 PROTHROMBIN TIME: CPT

## 2019-04-01 NOTE — PROGRESS NOTES
Chao Bernstein is a 69 y.o. y/o female with history of Afib, Aortic and Mitral Valve Porcine valve. She presents today for anticoagulation monitoring and adjustment. Pertinent PMH: The aortic and mitral valves are porcine and were replaced at the same time on March 9, 2010 by Dr. Dejah Marie. Patient Reported Findings:  Yes     No  [x]   []       Patient verifies current dosing regimen as listed  []   [x]       S/S bleeding/bruising/swelling/SOB   []   [x]       Blood in urine or stool   []   [x]       Procedures scheduled in the future at this time   []   [x]       Missed Dose  []   [x]       Extra Dose  []   [x]       Change in medications   []   [x]       Change in health/diet/appetite. Her norm would be spinach, broccoli, shahzad or turnip greens twice a week and salads with iceberg other lower vitamin K.  []   [x]       Change in alcohol use  []   [x]       Change in activity  []   [x]       Hospital admission  []   [x]       Emergency department visit  []   [x]       Other complaints     Clinical Outcomes:  Yes     No  []   [x]       Major bleeding event  []   [x]       Thromboembolic event    Duration of warfarin Therapy: Indefinitely  INR Range:  2.0-3.0   Keep at 2.5-3.0 per the collaborative agreement from Dr. Portillo Alert    INR is 2.9 today   Continue weekly dose of 2.5 mg on Mon & Fri and 5 mg all other days. Encouraged to maintain a consistency of vegetables/salads   Recheck INR in 4 weeks on 4/29    Referring cardiologist is Dr. Shai Avery.   INR (no units)   Date Value   04/01/2019 2.9   03/18/2019 1.8   03/04/2019 1.7   02/27/2019 1.11   02/04/2019 1.9   09/17/2018 4   08/20/2018 2.8   07/30/2018 3.2

## 2019-04-29 ENCOUNTER — ANTI-COAG VISIT (OUTPATIENT)
Dept: PHARMACY | Age: 74
End: 2019-04-29
Payer: MEDICARE

## 2019-04-29 DIAGNOSIS — Z95.2 S/P MITRAL VALVE REPLACEMENT: ICD-10-CM

## 2019-04-29 DIAGNOSIS — I48.0 PAROXYSMAL ATRIAL FIBRILLATION (HCC): ICD-10-CM

## 2019-04-29 DIAGNOSIS — Z95.2 S/P AORTIC VALVE REPLACEMENT: ICD-10-CM

## 2019-04-29 LAB — INTERNATIONAL NORMALIZATION RATIO, POC: 2.8

## 2019-04-29 PROCEDURE — 99211 OFF/OP EST MAY X REQ PHY/QHP: CPT

## 2019-04-29 PROCEDURE — 85610 PROTHROMBIN TIME: CPT

## 2019-05-14 ENCOUNTER — OFFICE VISIT (OUTPATIENT)
Dept: CARDIOLOGY CLINIC | Age: 74
End: 2019-05-14
Payer: MEDICARE

## 2019-05-14 VITALS
BODY MASS INDEX: 36.95 KG/M2 | HEIGHT: 62 IN | DIASTOLIC BLOOD PRESSURE: 68 MMHG | HEART RATE: 83 BPM | OXYGEN SATURATION: 98 % | WEIGHT: 200.8 LBS | SYSTOLIC BLOOD PRESSURE: 110 MMHG

## 2019-05-14 DIAGNOSIS — Z95.2 S/P AORTIC VALVE REPLACEMENT: Chronic | ICD-10-CM

## 2019-05-14 DIAGNOSIS — E78.5 HYPERLIPIDEMIA, UNSPECIFIED HYPERLIPIDEMIA TYPE: Primary | Chronic | ICD-10-CM

## 2019-05-14 DIAGNOSIS — I48.0 PAROXYSMAL ATRIAL FIBRILLATION (HCC): Chronic | ICD-10-CM

## 2019-05-14 DIAGNOSIS — Z95.2 S/P MITRAL VALVE REPLACEMENT: Chronic | ICD-10-CM

## 2019-05-14 PROCEDURE — 1090F PRES/ABSN URINE INCON ASSESS: CPT | Performed by: INTERNAL MEDICINE

## 2019-05-14 PROCEDURE — 3017F COLORECTAL CA SCREEN DOC REV: CPT | Performed by: INTERNAL MEDICINE

## 2019-05-14 PROCEDURE — 1123F ACP DISCUSS/DSCN MKR DOCD: CPT | Performed by: INTERNAL MEDICINE

## 2019-05-14 PROCEDURE — 3014F SCREEN MAMMO DOC REV: CPT | Performed by: INTERNAL MEDICINE

## 2019-05-14 PROCEDURE — G8417 CALC BMI ABV UP PARAM F/U: HCPCS | Performed by: INTERNAL MEDICINE

## 2019-05-14 PROCEDURE — 4040F PNEUMOC VAC/ADMIN/RCVD: CPT | Performed by: INTERNAL MEDICINE

## 2019-05-14 PROCEDURE — 99214 OFFICE O/P EST MOD 30 MIN: CPT | Performed by: INTERNAL MEDICINE

## 2019-05-14 PROCEDURE — G8427 DOCREV CUR MEDS BY ELIG CLIN: HCPCS | Performed by: INTERNAL MEDICINE

## 2019-05-14 PROCEDURE — 1036F TOBACCO NON-USER: CPT | Performed by: INTERNAL MEDICINE

## 2019-05-14 PROCEDURE — G8400 PT W/DXA NO RESULTS DOC: HCPCS | Performed by: INTERNAL MEDICINE

## 2019-05-14 NOTE — PROGRESS NOTES
visual changes   · ENT: No Headaches, hearing loss or vertigo. No mouth sores or sore throat. · Cardiovascular: Reviewed in HPI  · Respiratory: No cough or wheezing, no sputum production. · Gastrointestinal: No abdominal pain, appetite loss, blood in stools. No change in bowel or bladder habits. · Genitourinary: No nocturia, dysuria, trouble voiding  · Musculoskeletal:  No gait disturbance, weakness or joint complaints. · Integumentary: No rash or pruritis. · Neurological: No headache, change in muscle strength, numbness or tingling. No change in gait, balance, coordination, mood, affect, memory, mentation, behavior. · Psychiatric: No anxiety or depression  · Endocrine: No malaise or fever  · Hematologic/Lymphatic: No abnormal bruising or bleeding, blood clots or swollen lymph nodes. · Allergic/Immunologic: No nasal congestion or hives. Physical Examination:    Vitals:    05/14/19 1541   BP: 110/68   Site: Left Upper Arm   Position: Sitting   Cuff Size: Large Adult   Pulse: 83   SpO2: 98%   Weight: 200 lb 12.8 oz (91.1 kg)   Height: 5' 1.5\" (1.562 m)     Body mass index is 37.33 kg/m².      Wt Readings from Last 3 Encounters:   05/14/19 200 lb 12.8 oz (91.1 kg)   03/18/19 195 lb (88.5 kg)   02/27/19 197 lb (89.4 kg)      BP Readings from Last 3 Encounters:   05/14/19 110/68   03/18/19 119/61   02/27/19 116/62      Constitutional and General Appearance:  appears stated age  Eyes - no xanthelasma  Respiratory:  · Normal excursion and expansion without use of accessory muscles  · Resp Auscultation: Normal breath sounds without dullness  Cardiovascular:  · The apical impulses not displaced  · Heart is regular rate and rhythm with normal S1, S2 (tissue valves) , short 2/6 SM  · PMI is normal  · The carotid upstroke is normal, no bruit noted   · JVP is not elevated  · Peripheral pulses are symmetrical  · There is no clubbing, cyanosis of the extremities  · No edema  · No varicosities  · Femoral Arteries: 2+ and equal without bruits  · Pedal Pulses: 2+ and equal   Abdomen:  · No masses or tenderness  · Aorta not palpable  · Normal bowel sounds  Neurological/Psychiatric:  · Alert and oriented x3  · Moves all extremities well  · Exhibits normal gait balance and coordination      Assessment/Plan  1. Paroxysmal atrial fibrillation (HCC) -follows with Dr Romana Gambles, coumadin followed by INR clinic at [address]  5/16 Holter-- showed atrial flutter with AHR 93, few PVC's  9/17/12 Holter--Afib (100%), HR  ( avg 101) with PVCs. 2. S/P aortic valve replacement   Echo 4/17/14: EF 55%. reversal of E/A inflow velocities across the mitral valve suggesting impaired left ventricular relaxation. v A prosthetic mitral valve is well seated with maximum gradient of 15 mmHg and a mean gradient of 6 mmHg. Mild mitral regurgitation is present. The left atrium is dilated. A prosthetic aortic valve appears well seated with a maximum gradient of 24mmHg and a mean gradient of 15 mmHg. No evidence of aortic valve regurgitation. mild to moderate tricuspid regurgitation with RVSP estimated at 41mmHg. Mild pulmonic regurgitation present   3. S/P mitral valve replacement -stable, see above    4. Hyperlipidemia, unspecified hyperlipidemia type -well controlled on labs 1/21/19: ; TRIG 86; HDL 47; LDL 68       PLAN:  Stable cardiac status. No med changes. Encouraged to get regular exercise. FU 6 months. Thank you for allowing to me to participate in the care of Flora Li. Scribe's attestation: This note was scribed in the presence of Dr Hal Bunch MD by Christin Artist, RN. The scribe's documentation has been prepared under my direction and personally reviewed by me in its entirety. I confirm that the note above accurately reflects all work, treatment, procedures, and medical decision making performed by me.

## 2019-05-28 ENCOUNTER — ANTI-COAG VISIT (OUTPATIENT)
Dept: PHARMACY | Age: 74
End: 2019-05-28
Payer: MEDICARE

## 2019-05-28 DIAGNOSIS — I48.0 PAROXYSMAL ATRIAL FIBRILLATION (HCC): ICD-10-CM

## 2019-05-28 DIAGNOSIS — Z95.2 S/P AORTIC VALVE REPLACEMENT: ICD-10-CM

## 2019-05-28 DIAGNOSIS — Z95.2 S/P MITRAL VALVE REPLACEMENT: ICD-10-CM

## 2019-05-28 LAB — INTERNATIONAL NORMALIZATION RATIO, POC: 2.4

## 2019-05-28 PROCEDURE — 99211 OFF/OP EST MAY X REQ PHY/QHP: CPT

## 2019-05-28 PROCEDURE — 85610 PROTHROMBIN TIME: CPT

## 2019-06-25 ENCOUNTER — ANTI-COAG VISIT (OUTPATIENT)
Dept: PHARMACY | Age: 74
End: 2019-06-25
Payer: MEDICARE

## 2019-06-25 DIAGNOSIS — Z95.2 S/P MITRAL VALVE REPLACEMENT: ICD-10-CM

## 2019-06-25 DIAGNOSIS — I48.0 PAROXYSMAL ATRIAL FIBRILLATION (HCC): ICD-10-CM

## 2019-06-25 DIAGNOSIS — Z95.2 S/P AORTIC VALVE REPLACEMENT: ICD-10-CM

## 2019-06-25 LAB — INTERNATIONAL NORMALIZATION RATIO, POC: 2.4

## 2019-06-25 PROCEDURE — 85610 PROTHROMBIN TIME: CPT

## 2019-06-25 PROCEDURE — 99211 OFF/OP EST MAY X REQ PHY/QHP: CPT

## 2019-06-25 NOTE — PROGRESS NOTES
Lord Lars Crooks is a 69 y.o. y/o female with history of Afib, Aortic and Mitral Valve Porcine valve. She presents today for anticoagulation monitoring and adjustment. Pertinent PMH: The aortic and mitral valves are porcine and were replaced at the same time on March 9, 2010 by Dr. Jonathon Graves. Patient Reported Findings:  Yes     No  [x]   []       Patient verifies current dosing regimen as listed  []   [x]       S/S bleeding/bruising/swelling/SOB   []   [x]       Blood in urine or stool   []   [x]       Procedures scheduled in the future at this time   []   [x]       Missed Dose  []   [x]       Extra Dose  []   [x]       Change in medications   []   [x]       Change in health/diet/appetite. Her norm would be spinach, broccoli, shahzad or turnip greens twice a week and salads with iceberg other lower vitamin K.   []   [x]       Change in alcohol use  []   [x]       Change in activity  []   [x]       Hospital admission  []   [x]       Emergency department visit  []   [x]       Other complaints     Clinical Outcomes:  Yes     No  []   [x]       Major bleeding event  []   [x]       Thromboembolic event    Duration of warfarin Therapy: Indefinitely  INR Range:  2.0-3.0   Keep at 2.5-3.0 per the collaborative agreement from Dr. Curry Edu    INR is 2.4 again today   Continue weekly dose of 2.5 mg on Mon & Fri and 5 mg all other days. Encouraged to maintain a consistency of vegetables/salads   Recheck INR in 4 weeks on 7/23    Referring cardiologist is Dr. Nilesh Gutierres.   INR (no units)   Date Value   06/25/2019 2.4   05/28/2019 2.4   04/29/2019 2.8   04/01/2019 2.9   02/27/2019 1.11   09/17/2018 4   08/20/2018 2.8   07/30/2018 3.2

## 2019-07-23 ENCOUNTER — ANTI-COAG VISIT (OUTPATIENT)
Dept: PHARMACY | Age: 74
End: 2019-07-23
Payer: MEDICARE

## 2019-07-23 DIAGNOSIS — Z95.2 S/P MITRAL VALVE REPLACEMENT: ICD-10-CM

## 2019-07-23 DIAGNOSIS — Z95.2 S/P AORTIC VALVE REPLACEMENT: ICD-10-CM

## 2019-07-23 DIAGNOSIS — I48.0 PAROXYSMAL ATRIAL FIBRILLATION (HCC): ICD-10-CM

## 2019-07-23 LAB — INTERNATIONAL NORMALIZATION RATIO, POC: 1.6

## 2019-07-23 PROCEDURE — 99211 OFF/OP EST MAY X REQ PHY/QHP: CPT

## 2019-07-23 PROCEDURE — 85610 PROTHROMBIN TIME: CPT

## 2019-08-05 ENCOUNTER — ANTI-COAG VISIT (OUTPATIENT)
Dept: PHARMACY | Age: 74
End: 2019-08-05
Payer: MEDICARE

## 2019-08-05 DIAGNOSIS — I48.0 PAROXYSMAL ATRIAL FIBRILLATION (HCC): ICD-10-CM

## 2019-08-05 DIAGNOSIS — Z95.2 S/P MITRAL VALVE REPLACEMENT: ICD-10-CM

## 2019-08-05 DIAGNOSIS — Z95.2 S/P AORTIC VALVE REPLACEMENT: ICD-10-CM

## 2019-08-05 LAB — INTERNATIONAL NORMALIZATION RATIO, POC: 2.7

## 2019-08-05 PROCEDURE — 99211 OFF/OP EST MAY X REQ PHY/QHP: CPT

## 2019-08-05 PROCEDURE — 85610 PROTHROMBIN TIME: CPT

## 2019-09-03 ENCOUNTER — ANTI-COAG VISIT (OUTPATIENT)
Dept: PHARMACY | Age: 74
End: 2019-09-03
Payer: MEDICARE

## 2019-09-03 DIAGNOSIS — I48.0 PAROXYSMAL ATRIAL FIBRILLATION (HCC): ICD-10-CM

## 2019-09-03 DIAGNOSIS — Z95.2 S/P AORTIC VALVE REPLACEMENT: ICD-10-CM

## 2019-09-03 DIAGNOSIS — Z95.2 S/P MITRAL VALVE REPLACEMENT: ICD-10-CM

## 2019-09-03 LAB — INTERNATIONAL NORMALIZATION RATIO, POC: 2.2

## 2019-09-03 PROCEDURE — 99211 OFF/OP EST MAY X REQ PHY/QHP: CPT

## 2019-09-03 PROCEDURE — 85610 PROTHROMBIN TIME: CPT

## 2019-09-06 ENCOUNTER — HOSPITAL ENCOUNTER (OUTPATIENT)
Dept: WOMENS IMAGING | Age: 74
Discharge: HOME | End: 2019-09-06
Payer: MEDICARE

## 2019-09-06 ENCOUNTER — OFFICE VISIT (OUTPATIENT)
Dept: SURGERY | Age: 74
End: 2019-09-06
Payer: MEDICARE

## 2019-09-06 VITALS
DIASTOLIC BLOOD PRESSURE: 75 MMHG | BODY MASS INDEX: 35.88 KG/M2 | WEIGHT: 193 LBS | HEART RATE: 94 BPM | OXYGEN SATURATION: 100 % | SYSTOLIC BLOOD PRESSURE: 121 MMHG

## 2019-09-06 DIAGNOSIS — D24.1 PAPILLOMA OF RIGHT BREAST: ICD-10-CM

## 2019-09-06 DIAGNOSIS — Z09 SURGICAL FOLLOW-UP CARE: Primary | ICD-10-CM

## 2019-09-06 PROCEDURE — 4040F PNEUMOC VAC/ADMIN/RCVD: CPT | Performed by: SURGERY

## 2019-09-06 PROCEDURE — 3017F COLORECTAL CA SCREEN DOC REV: CPT | Performed by: SURGERY

## 2019-09-06 PROCEDURE — G8400 PT W/DXA NO RESULTS DOC: HCPCS | Performed by: SURGERY

## 2019-09-06 PROCEDURE — G8417 CALC BMI ABV UP PARAM F/U: HCPCS | Performed by: SURGERY

## 2019-09-06 PROCEDURE — 1123F ACP DISCUSS/DSCN MKR DOCD: CPT | Performed by: SURGERY

## 2019-09-06 PROCEDURE — G0279 TOMOSYNTHESIS, MAMMO: HCPCS

## 2019-09-06 PROCEDURE — 1090F PRES/ABSN URINE INCON ASSESS: CPT | Performed by: SURGERY

## 2019-09-06 PROCEDURE — 99213 OFFICE O/P EST LOW 20 MIN: CPT | Performed by: SURGERY

## 2019-09-06 PROCEDURE — G8427 DOCREV CUR MEDS BY ELIG CLIN: HCPCS | Performed by: SURGERY

## 2019-09-06 PROCEDURE — 3014F SCREEN MAMMO DOC REV: CPT | Performed by: SURGERY

## 2019-09-06 PROCEDURE — 1036F TOBACCO NON-USER: CPT | Performed by: SURGERY

## 2019-09-18 RX ORDER — CARVEDILOL 6.25 MG/1
TABLET ORAL
Qty: 180 TABLET | Refills: 3 | Status: SHIPPED | OUTPATIENT
Start: 2019-09-18 | End: 2020-07-30 | Stop reason: SDUPTHER

## 2019-09-23 RX ORDER — SIMVASTATIN 40 MG
40 TABLET ORAL NIGHTLY
Qty: 90 TABLET | Refills: 3 | Status: SHIPPED | OUTPATIENT
Start: 2019-09-23 | End: 2020-07-30 | Stop reason: SDUPTHER

## 2019-10-01 ENCOUNTER — ANTI-COAG VISIT (OUTPATIENT)
Dept: PHARMACY | Age: 74
End: 2019-10-01
Payer: MEDICARE

## 2019-10-01 DIAGNOSIS — Z95.2 S/P MITRAL VALVE REPLACEMENT: ICD-10-CM

## 2019-10-01 DIAGNOSIS — Z95.2 S/P AORTIC VALVE REPLACEMENT: ICD-10-CM

## 2019-10-01 DIAGNOSIS — I48.0 PAROXYSMAL ATRIAL FIBRILLATION (HCC): ICD-10-CM

## 2019-10-01 LAB — INTERNATIONAL NORMALIZATION RATIO, POC: 2

## 2019-10-01 PROCEDURE — 99211 OFF/OP EST MAY X REQ PHY/QHP: CPT

## 2019-10-01 PROCEDURE — 85610 PROTHROMBIN TIME: CPT

## 2019-10-29 ENCOUNTER — ANTI-COAG VISIT (OUTPATIENT)
Dept: PHARMACY | Age: 74
End: 2019-10-29
Payer: MEDICARE

## 2019-10-29 DIAGNOSIS — I48.0 PAROXYSMAL ATRIAL FIBRILLATION (HCC): ICD-10-CM

## 2019-10-29 DIAGNOSIS — Z95.2 S/P MITRAL VALVE REPLACEMENT: ICD-10-CM

## 2019-10-29 DIAGNOSIS — Z95.2 S/P AORTIC VALVE REPLACEMENT: ICD-10-CM

## 2019-10-29 LAB — INTERNATIONAL NORMALIZATION RATIO, POC: 1.8

## 2019-10-29 PROCEDURE — 99212 OFFICE O/P EST SF 10 MIN: CPT

## 2019-10-29 PROCEDURE — 85610 PROTHROMBIN TIME: CPT

## 2019-11-12 ENCOUNTER — ANTI-COAG VISIT (OUTPATIENT)
Dept: PHARMACY | Age: 74
End: 2019-11-12
Payer: MEDICARE

## 2019-11-12 DIAGNOSIS — Z95.2 S/P AORTIC VALVE REPLACEMENT: ICD-10-CM

## 2019-11-12 DIAGNOSIS — Z95.2 S/P MITRAL VALVE REPLACEMENT: ICD-10-CM

## 2019-11-12 DIAGNOSIS — I48.0 PAROXYSMAL ATRIAL FIBRILLATION (HCC): ICD-10-CM

## 2019-11-12 LAB — INTERNATIONAL NORMALIZATION RATIO, POC: 2.6

## 2019-11-12 PROCEDURE — 85610 PROTHROMBIN TIME: CPT

## 2019-11-12 PROCEDURE — 99211 OFF/OP EST MAY X REQ PHY/QHP: CPT

## 2019-11-21 ENCOUNTER — TELEPHONE (OUTPATIENT)
Dept: CARDIOLOGY CLINIC | Age: 74
End: 2019-11-21

## 2019-11-26 ENCOUNTER — ANTI-COAG VISIT (OUTPATIENT)
Dept: PHARMACY | Age: 74
End: 2019-11-26
Payer: MEDICARE

## 2019-11-26 DIAGNOSIS — Z95.2 S/P MITRAL VALVE REPLACEMENT: ICD-10-CM

## 2019-11-26 DIAGNOSIS — Z95.2 S/P AORTIC VALVE REPLACEMENT: ICD-10-CM

## 2019-11-26 DIAGNOSIS — I48.0 PAROXYSMAL ATRIAL FIBRILLATION (HCC): ICD-10-CM

## 2019-11-26 LAB — INTERNATIONAL NORMALIZATION RATIO, POC: 2.2

## 2019-11-26 PROCEDURE — 85610 PROTHROMBIN TIME: CPT

## 2019-11-26 PROCEDURE — 99211 OFF/OP EST MAY X REQ PHY/QHP: CPT

## 2019-12-24 ENCOUNTER — ANTI-COAG VISIT (OUTPATIENT)
Dept: PHARMACY | Age: 74
End: 2019-12-24
Payer: MEDICARE

## 2019-12-24 DIAGNOSIS — Z95.2 S/P MITRAL VALVE REPLACEMENT: ICD-10-CM

## 2019-12-24 DIAGNOSIS — I48.0 PAROXYSMAL ATRIAL FIBRILLATION (HCC): ICD-10-CM

## 2019-12-24 DIAGNOSIS — Z95.2 S/P AORTIC VALVE REPLACEMENT: ICD-10-CM

## 2019-12-24 LAB — INTERNATIONAL NORMALIZATION RATIO, POC: 2.5

## 2019-12-24 PROCEDURE — 99211 OFF/OP EST MAY X REQ PHY/QHP: CPT

## 2019-12-24 PROCEDURE — 85610 PROTHROMBIN TIME: CPT

## 2020-01-09 ENCOUNTER — OFFICE VISIT (OUTPATIENT)
Dept: CARDIOLOGY CLINIC | Age: 75
End: 2020-01-09
Payer: MEDICARE

## 2020-01-09 VITALS
WEIGHT: 196 LBS | SYSTOLIC BLOOD PRESSURE: 108 MMHG | HEART RATE: 65 BPM | BODY MASS INDEX: 37 KG/M2 | HEIGHT: 61 IN | DIASTOLIC BLOOD PRESSURE: 60 MMHG

## 2020-01-09 PROCEDURE — 3017F COLORECTAL CA SCREEN DOC REV: CPT | Performed by: INTERNAL MEDICINE

## 2020-01-09 PROCEDURE — G8484 FLU IMMUNIZE NO ADMIN: HCPCS | Performed by: INTERNAL MEDICINE

## 2020-01-09 PROCEDURE — 1036F TOBACCO NON-USER: CPT | Performed by: INTERNAL MEDICINE

## 2020-01-09 PROCEDURE — G9899 SCRN MAM PERF RSLTS DOC: HCPCS | Performed by: INTERNAL MEDICINE

## 2020-01-09 PROCEDURE — 1090F PRES/ABSN URINE INCON ASSESS: CPT | Performed by: INTERNAL MEDICINE

## 2020-01-09 PROCEDURE — 93000 ELECTROCARDIOGRAM COMPLETE: CPT | Performed by: INTERNAL MEDICINE

## 2020-01-09 PROCEDURE — 99214 OFFICE O/P EST MOD 30 MIN: CPT | Performed by: INTERNAL MEDICINE

## 2020-01-09 PROCEDURE — G8417 CALC BMI ABV UP PARAM F/U: HCPCS | Performed by: INTERNAL MEDICINE

## 2020-01-09 PROCEDURE — G8400 PT W/DXA NO RESULTS DOC: HCPCS | Performed by: INTERNAL MEDICINE

## 2020-01-09 PROCEDURE — 1123F ACP DISCUSS/DSCN MKR DOCD: CPT | Performed by: INTERNAL MEDICINE

## 2020-01-09 PROCEDURE — 4040F PNEUMOC VAC/ADMIN/RCVD: CPT | Performed by: INTERNAL MEDICINE

## 2020-01-09 PROCEDURE — G8427 DOCREV CUR MEDS BY ELIG CLIN: HCPCS | Performed by: INTERNAL MEDICINE

## 2020-01-09 NOTE — PROGRESS NOTES
Social History:  Social History     Socioeconomic History    Marital status:      Spouse name: Not on file    Number of children: Not on file    Years of education: Not on file    Highest education level: Not on file   Occupational History    Not on file   Social Needs    Financial resource strain: Not on file    Food insecurity:     Worry: Not on file     Inability: Not on file    Transportation needs:     Medical: Not on file     Non-medical: Not on file   Tobacco Use    Smoking status: Never Smoker    Smokeless tobacco: Never Used   Substance and Sexual Activity    Alcohol use: Yes     Alcohol/week: 0.0 standard drinks     Comment: rare    Drug use: No    Sexual activity: Not on file   Lifestyle    Physical activity:     Days per week: Not on file     Minutes per session: Not on file    Stress: Not on file   Relationships    Social connections:     Talks on phone: Not on file     Gets together: Not on file     Attends Evangelical service: Not on file     Active member of club or organization: Not on file     Attends meetings of clubs or organizations: Not on file     Relationship status: Not on file    Intimate partner violence:     Fear of current or ex partner: Not on file     Emotionally abused: Not on file     Physically abused: Not on file     Forced sexual activity: Not on file   Other Topics Concern    Not on file   Social History Narrative    Retired  [address] [place] [address]  , non smoker , non drinker , 2 children  Now a         Family History:  family history includes Cancer in her maternal aunt; Diabetes in her mother; Heart Disease in her mother. Allergies:  Patient has no known allergies. Review of Systems:   · Constitutional: there has been no unanticipated weight loss. No change in energy or activity level   · Eyes: No visual changes   · ENT: No Headaches, hearing loss or vertigo. No mouth sores or sore throat.   · Cardiovascular: Reviewed in HPI  · Respiratory: No cough oriented x3  · Moves all extremities well  · Exhibits normal gait balance and coordination      Assessment/Plan  1. atrial fibrillation/flutter ([address] -follows with Dr Farideh Chavarria, coumadin followed by INR clinic at [facility], EKG>atrial flutter, HR 65bpm  5/16 Holter-- showed atrial flutter with AHR 93, few PVC's  9/17/12 Holter--Afib (100%), HR  ( avg 101) with PVCs. 2. S/P aortic valve replacement   Echo 4/17/14: EF 55%. reversal of E/A inflow velocities across the mitral valve suggesting impaired left ventricular relaxation. v A prosthetic mitral valve is well seated with maximum gradient of 15 mmHg and a mean gradient of 6 mmHg. Mild mitral regurgitation is present. The left atrium is dilated. A prosthetic aortic valve appears well seated with a maximum gradient of 24mmHg and a mean gradient of 15 mmHg. No evidence of aortic valve regurgitation. mild to moderate tricuspid regurgitation with RVSP estimated at 41mmHg. Mild pulmonic regurgitation present   3. S/P mitral valve replacement -stable, see above    4. Hyperlipidemia, unspecified hyperlipidemia type -well controlled on labs 1/21/19: ; TRIG 86; HDL 47; LDL 68       PLAN:  Ms Gatito Molina has been advised to check lipids and CMP. No med changes. Continue regular exercise. FU 6 months. Thank you for allowing to me to participate in the care of Fiona Nina. Scribe's attestation: This note was scribed in the presence of Dr Didi Rodriguez MD by Siddhartha Yates RN. The scribe's documentation has been prepared under my direction and personally reviewed by me in its entirety. I confirm that the note above accurately reflects all work, treatment, procedures, and medical decision making performed by me.

## 2020-01-21 ENCOUNTER — HOSPITAL ENCOUNTER (OUTPATIENT)
Age: 75
Discharge: HOME | End: 2020-01-21
Payer: MEDICARE

## 2020-01-21 ENCOUNTER — HOSPITAL ENCOUNTER (OUTPATIENT)
Dept: WOMENS IMAGING | Age: 75
Discharge: HOME | End: 2020-01-21
Payer: MEDICARE

## 2020-01-21 ENCOUNTER — ANTI-COAG VISIT (OUTPATIENT)
Dept: PHARMACY | Age: 75
End: 2020-01-21
Payer: MEDICARE

## 2020-01-21 LAB
A/G RATIO: 1.2 (ref 1.1–2.2)
ALBUMIN SERPL-MCNC: 4.1 G/DL (ref 3.4–5)
ALP BLD-CCNC: 92 U/L (ref 40–129)
ALT SERPL-CCNC: 18 U/L (ref 10–40)
ANION GAP SERPL CALCULATED.3IONS-SCNC: 14 MMOL/L (ref 3–16)
AST SERPL-CCNC: 22 U/L (ref 15–37)
BILIRUB SERPL-MCNC: 0.6 MG/DL (ref 0–1)
BUN BLDV-MCNC: 13 MG/DL (ref 7–20)
CALCIUM SERPL-MCNC: 9.8 MG/DL (ref 8.3–10.6)
CHLORIDE BLD-SCNC: 107 MMOL/L (ref 99–110)
CHOLESTEROL, TOTAL: 131 MG/DL (ref 0–199)
CO2: 24 MMOL/L (ref 21–32)
CREAT SERPL-MCNC: 0.9 MG/DL (ref 0.6–1.2)
GFR AFRICAN AMERICAN: >60
GFR NON-AFRICAN AMERICAN: >60
GLOBULIN: 3.3 G/DL
GLUCOSE BLD-MCNC: 104 MG/DL (ref 70–99)
HDLC SERPL-MCNC: 49 MG/DL (ref 40–60)
INTERNATIONAL NORMALIZATION RATIO, POC: 2.3
LDL CHOLESTEROL CALCULATED: 66 MG/DL
POTASSIUM SERPL-SCNC: 4.1 MMOL/L (ref 3.5–5.1)
SODIUM BLD-SCNC: 145 MMOL/L (ref 136–145)
TOTAL PROTEIN: 7.4 G/DL (ref 6.4–8.2)
TRIGL SERPL-MCNC: 81 MG/DL (ref 0–150)
VLDLC SERPL CALC-MCNC: 16 MG/DL

## 2020-01-21 PROCEDURE — 99211 OFF/OP EST MAY X REQ PHY/QHP: CPT

## 2020-01-21 PROCEDURE — 80053 COMPREHEN METABOLIC PANEL: CPT

## 2020-01-21 PROCEDURE — 85610 PROTHROMBIN TIME: CPT

## 2020-01-21 PROCEDURE — 77063 BREAST TOMOSYNTHESIS BI: CPT

## 2020-01-21 PROCEDURE — 36415 COLL VENOUS BLD VENIPUNCTURE: CPT

## 2020-01-21 PROCEDURE — 80061 LIPID PANEL: CPT

## 2020-02-18 ENCOUNTER — ANTI-COAG VISIT (OUTPATIENT)
Dept: PHARMACY | Age: 75
End: 2020-02-18
Payer: MEDICARE

## 2020-02-18 LAB — INTERNATIONAL NORMALIZATION RATIO, POC: 3

## 2020-02-18 PROCEDURE — 85610 PROTHROMBIN TIME: CPT

## 2020-02-18 PROCEDURE — 99211 OFF/OP EST MAY X REQ PHY/QHP: CPT

## 2020-03-16 ENCOUNTER — TELEPHONE (OUTPATIENT)
Dept: PHARMACY | Age: 75
End: 2020-03-16

## 2020-03-17 ENCOUNTER — ANTI-COAG VISIT (OUTPATIENT)
Dept: PHARMACY | Age: 75
End: 2020-03-17
Payer: MEDICARE

## 2020-03-17 LAB — INTERNATIONAL NORMALIZATION RATIO, POC: 2.6

## 2020-03-17 PROCEDURE — 99211 OFF/OP EST MAY X REQ PHY/QHP: CPT

## 2020-03-17 PROCEDURE — 85610 PROTHROMBIN TIME: CPT

## 2020-03-17 NOTE — PROGRESS NOTES
Roly Delradha Ms. Sapphire Chavarria is a 70 y.o. y/o female with history of Afib, Aortic and Mitral Valve Porcine valve. She presents today for anticoagulation monitoring and adjustment. Pertinent PMH: The aortic and mitral valves are porcine and were replaced at the same time on March 9, 2010 by Dr. Ld Beatty. Patient Reported Findings:  Yes     No  [x]   []       Patient verifies current dosing regimen as listed  []   [x]       S/S bleeding/bruising/swelling/SOB   []   [x]       Blood in urine or stool   []   [x]       Procedures scheduled in the future at this time    []   [x]       Missed Dose   []   [x]       Extra Dose   [x]   []       Change in medications- restarted fish oil---> vitamin D started   []   [x]       Change in health/diet/appetite. Her norm would be spinach, broccoli, shahzad or turnip greens twice a week and salads with iceberg other lower vitamin K -->no changes   []   [x]       Change in alcohol use  []   [x]       Change in activity  []   [x]       Hospital admission  []   [x]       Emergency department visit  []   [x]       Other complaints     Clinical Outcomes:  Yes     No  []   [x]       Major bleeding event  []   [x]       Thromboembolic event    Duration of warfarin Therapy: Indefinitely  INR Range:  2.0-3.0   Keep at 2.5-3.0 per the collaborative agreement from Dr. Malcom Pollack    INR is 2.6 today   Continue taking 2.5 mg on Mon only and 5 mg all other days   Encouraged to maintain a consistency of vegetables/salads     Recheck INR in 6 weeks, 4/28 to decrease COVID-19 exposure. Referring cardiologist is Dr. Lord Jameson.   INR (no units)   Date Value   03/17/2020 2.6   02/18/2020 3.0   01/21/2020 2.3   12/24/2019 2.5   02/27/2019 1.11   09/17/2018 4   08/20/2018 2.8   07/30/2018 3.2

## 2020-04-13 ENCOUNTER — TELEPHONE (OUTPATIENT)
Dept: PHARMACY | Age: 75
End: 2020-04-13

## 2020-04-13 NOTE — TELEPHONE ENCOUNTER
Pt returned call. States she would prefer to keep appt for 4/28. Explained that we aren't seeing patients in the clinic until COVID-19 has passed then we will re-open. We are still asking patients to get their INRs checked by getting a lab draw at a designated lab. We are asking that you get your lab drawn on the day of your appointment (time doesn't matter as long as you go during hours of operation). Then we will be contacting you via telephone with results and we will conduct the whole appointment over the telephone. Patient has selected [facility] to get their INR drawn.

## 2020-04-28 ENCOUNTER — ANTI-COAG VISIT (OUTPATIENT)
Dept: PHARMACY | Age: 75
End: 2020-04-28
Payer: MEDICARE

## 2020-04-28 PROCEDURE — 99211 OFF/OP EST MAY X REQ PHY/QHP: CPT

## 2020-06-09 ENCOUNTER — ANTI-COAG VISIT (OUTPATIENT)
Dept: PHARMACY | Age: 75
End: 2020-06-09
Payer: MEDICARE

## 2020-06-09 LAB — INTERNATIONAL NORMALIZATION RATIO, POC: 3

## 2020-06-09 PROCEDURE — 85610 PROTHROMBIN TIME: CPT

## 2020-06-09 PROCEDURE — 99211 OFF/OP EST MAY X REQ PHY/QHP: CPT

## 2020-07-21 ENCOUNTER — ANTI-COAG VISIT (OUTPATIENT)
Dept: PHARMACY | Age: 75
End: 2020-07-21
Payer: MEDICARE

## 2020-07-21 VITALS — TEMPERATURE: 96.9 F

## 2020-07-21 LAB — INTERNATIONAL NORMALIZATION RATIO, POC: 1.9

## 2020-07-21 PROCEDURE — 85610 PROTHROMBIN TIME: CPT

## 2020-07-21 PROCEDURE — 99211 OFF/OP EST MAY X REQ PHY/QHP: CPT

## 2020-07-21 NOTE — PROGRESS NOTES
Mirella Hernandez Ms. Laureen Dodson is a 70 y.o. y/o female with history of Afib, Aortic and Mitral Valve Porcine valve. She presents today for anticoagulation monitoring and adjustment. Pertinent PMH: The aortic and mitral valves are porcine and were replaced at the same time on March 9, 2010 by Dr. Boyd Li. Patient Reported Findings:  Yes     No  [x]   []       Patient verifies current dosing regimen as listed  []   [x]       S/S bleeding/bruising/swelling/SOB -denies   []   [x]       Blood in urine or stool -denies   []   [x]       Procedures scheduled in the future at this time    []   [x]       Missed Dose   []   [x]       Extra Dose   []   [x]       Change in medications- denies   []   [x]       Change in health/diet/appetite. Her norm would be spinach, broccoli, sahhzad or turnip greens twice a week and salads with iceberg other lower vitamin K -->no changes---> eating greens 2-3 times/week, no NVD   []   [x]       Change in alcohol use  []   [x]       Change in activity  []   [x]       Hospital admission  []   [x]       Emergency department visit  []   [x]       Other complaints     Clinical Outcomes:  Yes     No  []   [x]       Major bleeding event  []   [x]       Thromboembolic event    Duration of warfarin Therapy: Indefinitely  INR Range:  2.0-3.0   Keep at 2.5-3.0 per the collaborative agreement from Dr. Yelitza Bartlett    INR is 1.9 today dt messing dose up several times and eating more greens. Take 7.5mg tonight then continue taking 2.5 mg on Mon only and 5 mg all other days. Encouraged to maintain a consistency of vegetables/salads   Recheck INR in 3 weeks, 8/11 then go back to 6 weeks     Referring cardiologist is Dr. Carlos Rodriguez.   INR (no units)   Date Value   06/09/2020 3   04/28/2020 2.0 (H)   03/17/2020 2.6   02/18/2020 3.0   01/21/2020 2.3   02/27/2019 1.11   09/17/2018 4   08/20/2018 2.8     CLINICAL PHARMACY CONSULT: MED RECONCILIATION/REVIEW ADDENDUM    For Pharmacy Admin Tracking Only    PHSO: No  Total # of Interventions Recommended: 1  - Increased Dose #: 1  - Maintenance Safety Lab Monitoring #: 1  Total Interventions Accepted: 1  Time Spent (min): [address] Gian García, PharmD

## 2020-07-30 ENCOUNTER — OFFICE VISIT (OUTPATIENT)
Dept: CARDIOLOGY CLINIC | Age: 75
End: 2020-07-30
Payer: MEDICARE

## 2020-07-30 VITALS
HEART RATE: 65 BPM | SYSTOLIC BLOOD PRESSURE: 110 MMHG | BODY MASS INDEX: 36.63 KG/M2 | OXYGEN SATURATION: 97 % | WEIGHT: 194 LBS | DIASTOLIC BLOOD PRESSURE: 60 MMHG | HEIGHT: 61 IN

## 2020-07-30 PROCEDURE — 99214 OFFICE O/P EST MOD 30 MIN: CPT | Performed by: INTERNAL MEDICINE

## 2020-07-30 PROCEDURE — G9899 SCRN MAM PERF RSLTS DOC: HCPCS | Performed by: INTERNAL MEDICINE

## 2020-07-30 PROCEDURE — G8427 DOCREV CUR MEDS BY ELIG CLIN: HCPCS | Performed by: INTERNAL MEDICINE

## 2020-07-30 PROCEDURE — 1036F TOBACCO NON-USER: CPT | Performed by: INTERNAL MEDICINE

## 2020-07-30 PROCEDURE — 3017F COLORECTAL CA SCREEN DOC REV: CPT | Performed by: INTERNAL MEDICINE

## 2020-07-30 PROCEDURE — G8400 PT W/DXA NO RESULTS DOC: HCPCS | Performed by: INTERNAL MEDICINE

## 2020-07-30 PROCEDURE — 4040F PNEUMOC VAC/ADMIN/RCVD: CPT | Performed by: INTERNAL MEDICINE

## 2020-07-30 PROCEDURE — G8417 CALC BMI ABV UP PARAM F/U: HCPCS | Performed by: INTERNAL MEDICINE

## 2020-07-30 PROCEDURE — 1123F ACP DISCUSS/DSCN MKR DOCD: CPT | Performed by: INTERNAL MEDICINE

## 2020-07-30 PROCEDURE — 1090F PRES/ABSN URINE INCON ASSESS: CPT | Performed by: INTERNAL MEDICINE

## 2020-07-30 RX ORDER — SIMVASTATIN 40 MG
40 TABLET ORAL NIGHTLY
Qty: 90 TABLET | Refills: 3 | Status: SHIPPED | OUTPATIENT
Start: 2020-07-30 | End: 2021-07-28 | Stop reason: SDUPTHER

## 2020-07-30 RX ORDER — CARVEDILOL 6.25 MG/1
TABLET ORAL
Qty: 180 TABLET | Refills: 3 | Status: SHIPPED | OUTPATIENT
Start: 2020-07-30 | End: 2021-07-28 | Stop reason: SDUPTHER

## 2020-07-30 NOTE — PROGRESS NOTES
Socioeconomic History    Marital status:      Spouse name: Not on file    Number of children: Not on file    Years of education: Not on file    Highest education level: Not on file   Occupational History    Not on file   Social Needs    Financial resource strain: Not on file    Food insecurity     Worry: Not on file     Inability: Not on file    Transportation needs     Medical: Not on file     Non-medical: Not on file   Tobacco Use    Smoking status: Never Smoker    Smokeless tobacco: Never Used   Substance and Sexual Activity    Alcohol use: Yes     Alcohol/week: 0.0 standard drinks     Comment: rare    Drug use: No    Sexual activity: Not on file   Lifestyle    Physical activity     Days per week: Not on file     Minutes per session: Not on file    Stress: Not on file   Relationships    Social connections     Talks on phone: Not on file     Gets together: Not on file     Attends Muslim service: Not on file     Active member of club or organization: Not on file     Attends meetings of clubs or organizations: Not on file     Relationship status: Not on file    Intimate partner violence     Fear of current or ex partner: Not on file     Emotionally abused: Not on file     Physically abused: Not on file     Forced sexual activity: Not on file   Other Topics Concern    Not on file   Social History Narrative    Retired  [address] [employer]  , non smoker , non drinker , 2 children  Now a         Family History:  family history includes Cancer in her maternal aunt; Diabetes in her mother; Heart Disease in her mother. Allergies:  Patient has no known allergies. Review of Systems:   · Constitutional: there has been no unanticipated weight loss. No change in energy or activity level   · Eyes: No visual changes   · ENT: No Headaches, hearing loss or vertigo. No mouth sores or sore throat. · Cardiovascular: Reviewed in HPI  · Respiratory: No cough or wheezing, no sputum production. sounds  Neurological/Psychiatric:  · Alert and oriented x3  · Moves all extremities well  · Exhibits normal gait balance and coordination      Assessment/Plan  1. atrial fibrillation/flutter ([address] -follows with Dr Aston Tsai, coumadin followed by INR clinic at [address], EKG>atrial flutter, HR 65bpm  5/16 Holter-- showed atrial flutter with AHR 93, few PVC's  9/17/12 Holter--Afib (100%), HR  ( avg 101) with PVCs. 2. S/P aortic valve replacement   Echo 4/17/14: EF 55%. reversal of E/A inflow velocities across the mitral valve suggesting impaired left ventricular relaxation. v A prosthetic mitral valve is well seated with maximum gradient of 15 mmHg and a mean gradient of 6 mmHg. Mild mitral regurgitation is present. The left atrium is dilated. A prosthetic aortic valve appears well seated with a maximum gradient of 24mmHg and a mean gradient of 15 mmHg. No evidence of aortic valve regurgitation. mild to moderate tricuspid regurgitation with RVSP estimated at 41mmHg. Mild pulmonic regurgitation present   3. S/P mitral valve replacement -stable, see above    4. Hyperlipidemia, unspecified hyperlipidemia type -well controlled on labs 1/21/20: ; TRIG 81; HDL 49; LDL 66       PLAN:  Ms Dena Jackson has been advised to repeat an echo for evaluation of her 8year old porcine valve. . No med changes. Regular exercise encouraged. FU 6 months. Thank you for allowing to me to participate in the care of Yelitza George. Scribe's attestation: This note was scribed in the presence of Dr Gwynneth Aschoff MD by Keith Mcelroy RN. The scribe's documentation has been prepared under my direction and personally reviewed by me in its entirety. I confirm that the note above accurately reflects all work, treatment, procedures, and medical decision making performed by me.

## 2020-08-11 ENCOUNTER — ANTI-COAG VISIT (OUTPATIENT)
Dept: PHARMACY | Age: 75
End: 2020-08-11
Payer: MEDICARE

## 2020-08-11 VITALS — TEMPERATURE: 97.1 F

## 2020-08-11 LAB — INTERNATIONAL NORMALIZATION RATIO, POC: 2.4

## 2020-08-11 PROCEDURE — 99211 OFF/OP EST MAY X REQ PHY/QHP: CPT

## 2020-08-11 PROCEDURE — 85610 PROTHROMBIN TIME: CPT

## 2020-08-11 NOTE — PROGRESS NOTES
Rachel Bautista Ms. Leia Price is a 70 y.o. y/o female with history of Afib, Aortic and Mitral Valve Porcine valve. She presents today for anticoagulation monitoring and adjustment. Pertinent PMH: The aortic and mitral valves are porcine and were replaced at the same time on March 9, 2010 by Dr. Maria T Ac. Patient Reported Findings:  Yes     No  [x]   []       Patient verifies current dosing regimen as listed  []   [x]       S/S bleeding/bruising/swelling/SOB -denies   []   [x]       Blood in urine or stool -denies   []   [x]       Procedures scheduled in the future at this time    []   [x]       Missed Dose   []   [x]       Extra Dose   []   [x]       Change in medications- denies   []   [x]       Change in health/diet/appetite. Her norm would be spinach, broccoli, shahzad or turnip greens twice a week and salads with iceberg other lower vitamin K -->no changes---> eating greens 2-3 times/week, no NVD   []   [x]       Change in alcohol use  []   [x]       Change in activity  []   [x]       Hospital admission  []   [x]       Emergency department visit  []   [x]       Other complaints     Clinical Outcomes:  Yes     No  []   [x]       Major bleeding event  []   [x]       Thromboembolic event    Duration of warfarin Therapy: Indefinitely  INR Range:  2.0-3.0   Keep at 2.5-3.0 per the collaborative agreement from Dr. Lolly Fong    INR is 2.4 today  Continue taking 2.5 mg on Mon only and 5 mg all other days. Encouraged to maintain a consistency of vegetables/salads   Patient called in a RF before coming today.    Recheck INR in 6 weeks, 9/22    Referring is Dr. Trang Karu   INR (no units)   Date Value   08/11/2020 2.4   07/21/2020 1.9   06/09/2020 3   04/28/2020 2.0 (H)   03/17/2020 2.6   02/27/2019 1.11   09/17/2018 4   08/20/2018 2.8     CLINICAL PHARMACY CONSULT: MED RECONCILIATION/REVIEW ADDENDUM    For Pharmacy Admin Tracking Only    PHSO: No  Total # of Interventions Recommended: 1  - Refills Provided #: 1  - Maintenance Safety Lab Monitoring #: 1  Total Interventions Accepted: 1  Time Spent (min): 15    Triston RuthD

## 2020-08-17 ENCOUNTER — TELEPHONE (OUTPATIENT)
Dept: CARDIOLOGY CLINIC | Age: 75
End: 2020-08-17

## 2020-08-17 ENCOUNTER — TELEPHONE (OUTPATIENT)
Dept: PHARMACY | Age: 75
End: 2020-08-17

## 2020-08-17 NOTE — TELEPHONE ENCOUNTER
Returned call and LVM explained will send referral to office in order for Dr. Kelly Ferreira to sign for patient. Any further questions return call.

## 2020-08-17 NOTE — TELEPHONE ENCOUNTER
----- Message from Duglas Piña sent at 8/17/2020 12:42 PM EDT -----  Regarding: Please call  Contact: Carmella-Dr. Pascal's office  Please call Carmella @ Dr. Denis Rushing office about patient's warfarin orders.   [phone]

## 2020-08-17 NOTE — TELEPHONE ENCOUNTER
Received orders from [facility] coumadin clinic for Dr Trenton Pickett to sign. Left message on v/m to call office. Pt has not been seen in 2 years by Dr. Trenton Pickett and will need a follow up appointment to sign coumadin clinic orders. Checking to see if she wants to see Dr. Trenton Pickett at [facility] or continue out at [facility].

## 2020-08-17 NOTE — TELEPHONE ENCOUNTER
Pt called back, she does not want to come to [place], she wants to continue at [facility]. Please call pt to schedule an appointment with EP to sign coumadin clinic orders.

## 2020-08-28 ENCOUNTER — HOSPITAL ENCOUNTER (OUTPATIENT)
Dept: CARDIOLOGY | Age: 75
Discharge: HOME | End: 2020-08-28
Payer: MEDICARE

## 2020-08-28 LAB
LEFT VENTRICULAR EJECTION FRACTION MODE: NORMAL
LV EF: 60 %
LV EF: 60 %
LVEF MODALITY: NORMAL

## 2020-08-28 PROCEDURE — 93306 TTE W/DOPPLER COMPLETE: CPT

## 2020-09-22 ENCOUNTER — ANTI-COAG VISIT (OUTPATIENT)
Dept: PHARMACY | Age: 75
End: 2020-09-22
Payer: MEDICARE

## 2020-09-22 VITALS — TEMPERATURE: 96.4 F

## 2020-09-22 LAB — INTERNATIONAL NORMALIZATION RATIO, POC: 2.5

## 2020-09-22 PROCEDURE — 99211 OFF/OP EST MAY X REQ PHY/QHP: CPT

## 2020-09-22 PROCEDURE — 85610 PROTHROMBIN TIME: CPT

## 2020-09-22 NOTE — PROGRESS NOTES
Tonye Cogan Ms. Tretha Headings is a 70 y.o. y/o female with history of Afib, Aortic and Mitral Valve Porcine valve. She presents today for anticoagulation monitoring and adjustment. Pertinent PMH: The aortic and mitral valves are porcine and were replaced at the same time on March 9, 2010 by Dr. Naty Thomson. Patient Reported Findings:  Yes     No  [x]   []       Patient verifies current dosing regimen as listed  []   [x]       S/S bleeding/bruising/swelling/SOB -denies   []   [x]       Blood in urine or stool -denies   []   [x]       Procedures scheduled in the future at this time    []   [x]       Missed Dose   []   [x]       Extra Dose   []   [x]       Change in medications- denies   []   [x]       Change in health/diet/appetite. Her norm would be spinach, broccoli, shahzad or turnip greens twice a week and salads with iceberg other lower vitamin K -->no changes---> eating greens 2-3 times/week, no NVD --> no changes   []   [x]       Change in alcohol use  []   [x]       Change in activity  []   [x]       Hospital admission  []   [x]       Emergency department visit  []   [x]       Other complaints     Clinical Outcomes:  Yes     No  []   [x]       Major bleeding event  []   [x]       Thromboembolic event    Duration of warfarin Therapy: Indefinitely  INR Range:  2.0-3.0   Keep at 2.5-3.0 per the collaborative agreement from Dr. Matty Perla    INR is 2.5 today  Continue taking 2.5 mg on Mon only and 5 mg all other days.    Encouraged to maintain a consistency of vegetables/salads   Recheck INR in 6 weeks, 11/3    Referring is Dr. Neeta Kwon   INR (no units)   Date Value   09/22/2020 2.5   08/11/2020 2.4   07/21/2020 1.9   06/09/2020 3   04/28/2020 2.0 (H)   02/27/2019 1.11   09/17/2018 4   08/20/2018 2.8     CLINICAL PHARMACY CONSULT: MED RECONCILIATION/REVIEW ADDENDUM    For Pharmacy Admin Tracking Only    PHSO: No  Total # of Interventions Recommended: 0  - Maintenance Safety Lab Monitoring #: 1  Total Interventions Accepted: 0  Time Spent (min): 55 GERONIMO Vazquez, PharmD

## 2020-11-03 ENCOUNTER — ANTI-COAG VISIT (OUTPATIENT)
Dept: PHARMACY | Age: 75
End: 2020-11-03
Payer: MEDICARE

## 2020-11-03 VITALS — TEMPERATURE: 96.8 F

## 2020-11-03 LAB — INTERNATIONAL NORMALIZATION RATIO, POC: 2.3

## 2020-11-03 PROCEDURE — 85610 PROTHROMBIN TIME: CPT

## 2020-11-03 PROCEDURE — 99211 OFF/OP EST MAY X REQ PHY/QHP: CPT

## 2020-12-16 ENCOUNTER — ANTI-COAG VISIT (OUTPATIENT)
Dept: PHARMACY | Age: 75
End: 2020-12-16
Payer: MEDICARE

## 2020-12-16 VITALS — TEMPERATURE: 96.9 F

## 2020-12-16 LAB — INTERNATIONAL NORMALIZATION RATIO, POC: 1.5

## 2020-12-16 PROCEDURE — 99211 OFF/OP EST MAY X REQ PHY/QHP: CPT

## 2020-12-16 PROCEDURE — 85610 PROTHROMBIN TIME: CPT

## 2020-12-16 NOTE — PROGRESS NOTES
09/17/2018 4   08/20/2018 2.8     CLINICAL PHARMACY CONSULT: MED RECONCILIATION/REVIEW ADDENDUM    For Pharmacy Admin Tracking Only    PHSO: No  Total # of Interventions Recommended: 0  - Maintenance Safety Lab Monitoring #: 1  Total Interventions Accepted: 0  Time Spent (min): 1001 Bertram Rick, PharmD

## 2021-01-13 ENCOUNTER — ANTI-COAG VISIT (OUTPATIENT)
Dept: PHARMACY | Age: 76
End: 2021-01-13
Payer: MEDICARE

## 2021-01-13 VITALS — TEMPERATURE: 96.8 F

## 2021-01-13 DIAGNOSIS — Z95.2 S/P AORTIC VALVE REPLACEMENT: ICD-10-CM

## 2021-01-13 DIAGNOSIS — I48.20 CHRONIC ATRIAL FIBRILLATION (HCC): ICD-10-CM

## 2021-01-13 DIAGNOSIS — Z95.2 S/P MITRAL VALVE REPLACEMENT: ICD-10-CM

## 2021-01-13 LAB — INTERNATIONAL NORMALIZATION RATIO, POC: 2.2

## 2021-01-13 PROCEDURE — 85610 PROTHROMBIN TIME: CPT

## 2021-01-13 PROCEDURE — 99211 OFF/OP EST MAY X REQ PHY/QHP: CPT

## 2021-01-13 NOTE — PROGRESS NOTES
Teresita Carlabautista Ms. Jimmie Jones is a 70 y.o. y/o female with history of Afib, Aortic and Mitral Valve Porcine valve. She presents today for anticoagulation monitoring and adjustment. Pertinent PMH: The aortic and mitral valves are porcine and were replaced at the same time on March 9, 2010 by Dr. Adalgisa Angulo. Patient Reported Findings:  Yes     No  [x]   []       Patient verifies current dosing regimen as listed  []   [x]       S/S bleeding/bruising/swelling/SOB -denies   []   [x]       Blood in urine or stool -denies   []   [x]       Procedures scheduled in the future at this time    []   [x]       Missed Dose   []   [x]       Extra Dose   []   [x]       Change in medications- denies   []   [x]       Change in health/diet/appetite. Her norm would be spinach, broccoli, shahzad or turnip greens twice a week and salads with iceberg other lower vitamin K -->no changes---> eating greens 2-3 times/week, no NVD --> no changes --> no changes, no NVD ---> Patient reports having shahzad greens 3 times weekly, this is an increase in vitamin K content from previous visits --> no changes. []   [x]       Change in alcohol use ---> Denies  []   [x]       Change in activity  []   [x]       Hospital admission  []   [x]       Emergency department visit  []   [x]       Other complaints     Clinical Outcomes:  Yes     No  []   [x]       Major bleeding event  []   [x]       Thromboembolic event    Duration of warfarin Therapy: Indefinitely  INR Range:  2.0-3.0   Keep at 2.5-3.0 per the collaborative agreement from Dr. Keesha Yap    INR is 2.2 today   Continue taking 2.5 mg on Mon only and 5 mg all other days.    Encouraged to maintain a consistency of vegetables/salads   Recheck INR in 5 weeks, 2/17 as pt refused to rtc sooner     Referring is Dr. Melisa Georges   INR (no units)   Date Value   12/16/2020 1.5   11/03/2020 2.3   09/22/2020 2.5   08/11/2020 2.4   04/28/2020 2.0 (H)   02/27/2019 1.11   09/17/2018 4   08/20/2018 2.8 CLINICAL PHARMACY CONSULT: MED RECONCILIATION/REVIEW ADDENDUM    For Pharmacy Admin Tracking Only    PHSO: No  Total # of Interventions Recommended: 0  - Maintenance Safety Lab Monitoring #: 1  Total Interventions Accepted: 0  Time Spent (min): 15    Josh Bernard, TristonD

## 2021-01-29 ENCOUNTER — OFFICE VISIT (OUTPATIENT)
Dept: CARDIOLOGY CLINIC | Age: 76
End: 2021-01-29
Payer: MEDICARE

## 2021-01-29 VITALS
BODY MASS INDEX: 37.19 KG/M2 | WEIGHT: 197 LBS | SYSTOLIC BLOOD PRESSURE: 130 MMHG | HEIGHT: 61 IN | OXYGEN SATURATION: 99 % | HEART RATE: 93 BPM | DIASTOLIC BLOOD PRESSURE: 80 MMHG

## 2021-01-29 DIAGNOSIS — Z95.2 S/P AORTIC VALVE REPLACEMENT: Chronic | ICD-10-CM

## 2021-01-29 DIAGNOSIS — I48.20 CHRONIC ATRIAL FIBRILLATION (HCC): ICD-10-CM

## 2021-01-29 DIAGNOSIS — Z95.2 S/P MITRAL VALVE REPLACEMENT: Primary | Chronic | ICD-10-CM

## 2021-01-29 DIAGNOSIS — E78.5 HYPERLIPIDEMIA, UNSPECIFIED HYPERLIPIDEMIA TYPE: Chronic | ICD-10-CM

## 2021-01-29 PROCEDURE — 1036F TOBACCO NON-USER: CPT | Performed by: INTERNAL MEDICINE

## 2021-01-29 PROCEDURE — G8484 FLU IMMUNIZE NO ADMIN: HCPCS | Performed by: INTERNAL MEDICINE

## 2021-01-29 PROCEDURE — 1090F PRES/ABSN URINE INCON ASSESS: CPT | Performed by: INTERNAL MEDICINE

## 2021-01-29 PROCEDURE — 4040F PNEUMOC VAC/ADMIN/RCVD: CPT | Performed by: INTERNAL MEDICINE

## 2021-01-29 PROCEDURE — G8400 PT W/DXA NO RESULTS DOC: HCPCS | Performed by: INTERNAL MEDICINE

## 2021-01-29 PROCEDURE — 99214 OFFICE O/P EST MOD 30 MIN: CPT | Performed by: INTERNAL MEDICINE

## 2021-01-29 PROCEDURE — G8427 DOCREV CUR MEDS BY ELIG CLIN: HCPCS | Performed by: INTERNAL MEDICINE

## 2021-01-29 PROCEDURE — 3017F COLORECTAL CA SCREEN DOC REV: CPT | Performed by: INTERNAL MEDICINE

## 2021-01-29 PROCEDURE — G8417 CALC BMI ABV UP PARAM F/U: HCPCS | Performed by: INTERNAL MEDICINE

## 2021-01-29 PROCEDURE — 1123F ACP DISCUSS/DSCN MKR DOCD: CPT | Performed by: INTERNAL MEDICINE

## 2021-01-29 NOTE — PROGRESS NOTES
[facility]   Cardiac Evaluation      Patient: Kan Luo  YOB: 1945  Date: 1/29/21       Chief Complaint   Patient presents with    Atrial Fibrillation    Hyperlipidemia        Referring provider: Loida Pak MD    History of Present Illness:   Ms Jimmie López is seen today in follow up. History includes porcine aortic/mitral valve replacements in 2010. She has hyperlipidemia and atrial flutter. Jerel Pang had rheumatic fever at the age of 17. She is retired from [employer]. Today, Ms Jimmie López states she has been well. She denies any chest pain, palpitations, BUENROSTRO, dizziness, or edema. She does not exercise regularly. Past Medical History:   has a past medical history of Aortic valve disease, rheumatic, Atrial flutter ([address] Hypercholesteremia, and Mitral Valve Disease. Surgical History:   has a past surgical history that includes Breast biopsy; Dilation and curettage of uterus; Cardioversion (2/4/13); Colonoscopy; Aortic valve replacement (03/2010); Mitral valve replacement (03/2010); and Breast biopsy (Right, 2/27/2019). Current Outpatient Medications   Medication Sig Dispense Refill    carvedilol (COREG) 6.25 MG tablet TAKE 1 TABLET TWICE DAILY WITH FOOD 180 tablet 3    simvastatin (ZOCOR) 40 MG tablet Take 1 tablet by mouth nightly 90 tablet 3    Cholecalciferol (VITAMIN D3) 2000 units CAPS Take by mouth      Probiotic Product (PROBIOTIC DAILY PO) Take by mouth daily      warfarin (COUMADIN) 5 MG tablet One tab po nightly and on Friday 1 1/2 tab 30 tablet 3    Multiple Vitamins-Minerals (CENTRUM SILVER) TABS Take 1 tablet by mouth as needed       acetaminophen (TYLENOL) 500 MG tablet Take 500 mg by mouth every 6 hours as needed for Pain       No current facility-administered medications for this visit. Social History:  Social History     Socioeconomic History    Marital status:       Spouse name: Not on file    Number of children: Not on file  Years of education: Not on file    Highest education level: Not on file   Occupational History    Not on file   Social Needs    Financial resource strain: Not on file    Food insecurity     Worry: Not on file     Inability: Not on file    Transportation needs     Medical: Not on file     Non-medical: Not on file   Tobacco Use    Smoking status: Never Smoker    Smokeless tobacco: Never Used   Substance and Sexual Activity    Alcohol use: Yes     Alcohol/week: 0.0 standard drinks     Comment: rare    Drug use: No    Sexual activity: Not on file   Lifestyle    Physical activity     Days per week: Not on file     Minutes per session: Not on file    Stress: Not on file   Relationships    Social connections     Talks on phone: Not on file     Gets together: Not on file     Attends Denominational service: Not on file     Active member of club or organization: Not on file     Attends meetings of clubs or organizations: Not on file     Relationship status: Not on file    Intimate partner violence     Fear of current or ex partner: Not on file     Emotionally abused: Not on file     Physically abused: Not on file     Forced sexual activity: Not on file   Other Topics Concern    Not on file   Social History Narrative    Retired  [address]  , non smoker , non drinker , 2 children  Now a         Family History:  family history includes Cancer in her maternal aunt; Diabetes in her mother; Heart Disease in her mother. Allergies:  Patient has no known allergies. Review of Systems:   · Constitutional: there has been no unanticipated weight loss. No change in energy or activity level   · Eyes: No visual changes   · ENT: No Headaches, hearing loss or vertigo. No mouth sores or sore throat. · Cardiovascular: Reviewed in HPI  · Respiratory: No cough or wheezing, no sputum production. · Gastrointestinal: No abdominal pain, appetite loss, blood in stools.  No change in bowel or bladder habits. · Genitourinary: No nocturia, dysuria, trouble voiding  · Musculoskeletal:  No gait disturbance, weakness or joint complaints. · Integumentary: No rash or pruritis. · Neurological: No headache, change in muscle strength, numbness or tingling. No change in gait, balance, coordination, mood, affect, memory, mentation, behavior. · Psychiatric: No anxiety or depression  · Endocrine: No malaise or fever  · Hematologic/Lymphatic: No abnormal bruising or bleeding, blood clots or swollen lymph nodes. · Allergic/Immunologic: No nasal congestion or hives. Physical Examination:    Vitals:    01/29/21 1146   BP: 130/80   Site: Left Upper Arm   Position: Sitting   Cuff Size: Medium Adult   Pulse: 93   SpO2: 99%   Weight: 197 lb (89.4 kg)   Height: 5' 1\" (1.549 m)     Body mass index is 37.22 kg/m².      Wt Readings from Last 3 Encounters:   01/29/21 197 lb (89.4 kg)   07/30/20 194 lb (88 kg)   01/09/20 196 lb (88.9 kg)      BP Readings from Last 3 Encounters:   01/29/21 130/80   07/30/20 110/60   01/09/20 108/60      Constitutional and General Appearance:  appears younger than stated age, always happy and very pleasant  Eyes - no xanthelasma  Respiratory:  · Normal excursion and expansion without use of accessory muscles  · Resp Auscultation: Normal breath sounds without dullness  Cardiovascular:  · The apical impulses not displaced  · Heart is regular rate and rhythm with normal S1, S2 (tissue valves) , short 2/6 SM  · PMI is normal  · The carotid upstroke is normal, no bruit noted   · JVP is not elevated  · Peripheral pulses are symmetrical  · There is no clubbing, cyanosis of the extremities  · No edema  · No varicosities  · Femoral Arteries: 2+ and equal without bruits  · Pedal Pulses: 2+ and equal   Abdomen:  · No masses or tenderness  · Aorta not palpable  · Normal bowel sounds  Neurological/Psychiatric:  · Alert and oriented x3  · Moves all extremities well  · Exhibits normal gait balance and coordination      Assessment/Plan  1. atrial fibrillation/flutter ([address] -follows with Dr Lucian Lugo, coumadin followed by INR clinic at [facility]  5/16 Holter-- showed atrial flutter with AHR 93, few PVC's  9/17/12 Holter--Afib (100%), HR  ( avg 101) with PVCs. 2. S/P aortic valve replacement   Echo 8/28/20: EF 60%. Grade II DD, A bioprosthetic mitral valve is well seated with peak velocity of 2.3m/s and a mean gradient of 7mmHg. Mild mitral regurgitation. A bioprosthetic artificial aortic valve appears well seated with a maximum velocity of 2.5m/s and a mean gradient of 16mmHg. Moderate tricuspid regurgitation. Estimated pulmonary artery systolic pressure is mildly to moderately elevated at 42 mmHg assuming a right atrial pressure of 3 mmHg. The left atrium is dilated. Echo 4/17/14: EF 55%. reversal of E/A inflow velocities across the mitral valve suggesting impaired left ventricular relaxation. v A prosthetic mitral valve is well seated with maximum gradient of 15 mmHg and a mean gradient of 6 mmHg. Mild mitral regurgitation is present. The left atrium is dilated. A prosthetic aortic valve appears well seated with a maximum gradient of 24mmHg and a mean gradient of 15 mmHg. No evidence of aortic valve regurgitation. mild to moderate tricuspid regurgitation with RVSP estimated at 41mmHg. Mild pulmonic regurgitation present   3. S/P mitral valve replacement -stable, see above    4. Hyperlipidemia, unspecified hyperlipidemia type -well controlled on labs 1/21/20: ; TRIG 81; HDL 49; LDL 66, will repeat       PLAN: Repeat lipids and CMP. No med changes. Regular exercise is encouraged. FU 6 months. Thank you for allowing to me to participate in the care of Parth Topete. Scribe's attestation: This note was scribed in the presence of Dr Lui Rashid MD by Jose Castillo, JILLIAN. The scribe's documentation has been prepared under my direction and personally reviewed by me in its entirety.  I confirm that the note above accurately reflects all work, treatment, procedures, and medical decision making performed by me.

## 2021-02-17 ENCOUNTER — HOSPITAL ENCOUNTER (OUTPATIENT)
Age: 76
Discharge: HOME | End: 2021-02-17
Payer: MEDICARE

## 2021-02-17 ENCOUNTER — ANTI-COAG VISIT (OUTPATIENT)
Dept: PHARMACY | Age: 76
End: 2021-02-17
Payer: MEDICARE

## 2021-02-17 VITALS — TEMPERATURE: 95.1 F

## 2021-02-17 DIAGNOSIS — Z95.2 S/P AORTIC VALVE REPLACEMENT: ICD-10-CM

## 2021-02-17 DIAGNOSIS — E78.5 HYPERLIPIDEMIA, UNSPECIFIED HYPERLIPIDEMIA TYPE: Chronic | ICD-10-CM

## 2021-02-17 DIAGNOSIS — Z95.2 S/P MITRAL VALVE REPLACEMENT: ICD-10-CM

## 2021-02-17 DIAGNOSIS — I48.20 CHRONIC ATRIAL FIBRILLATION (HCC): ICD-10-CM

## 2021-02-17 LAB
A/G RATIO: 1.2 (ref 1.1–2.2)
ALBUMIN SERPL-MCNC: 4.2 G/DL (ref 3.4–5)
ALP BLD-CCNC: 88 U/L (ref 40–129)
ALT SERPL-CCNC: 15 U/L (ref 10–40)
ANION GAP SERPL CALCULATED.3IONS-SCNC: 12 MMOL/L (ref 3–16)
AST SERPL-CCNC: 24 U/L (ref 15–37)
BILIRUB SERPL-MCNC: 0.5 MG/DL (ref 0–1)
BUN BLDV-MCNC: 13 MG/DL (ref 7–20)
CALCIUM SERPL-MCNC: 9.5 MG/DL (ref 8.3–10.6)
CHLORIDE BLD-SCNC: 106 MMOL/L (ref 99–110)
CHOLESTEROL, TOTAL: 141 MG/DL (ref 0–199)
CO2: 25 MMOL/L (ref 21–32)
CREAT SERPL-MCNC: 0.9 MG/DL (ref 0.6–1.2)
GFR AFRICAN AMERICAN: >60
GFR NON-AFRICAN AMERICAN: >60
GLOBULIN: 3.5 G/DL
GLUCOSE BLD-MCNC: 108 MG/DL (ref 70–99)
HDLC SERPL-MCNC: 48 MG/DL (ref 40–60)
INTERNATIONAL NORMALIZATION RATIO, POC: 1.7
LDL CHOLESTEROL CALCULATED: 75 MG/DL
POTASSIUM SERPL-SCNC: 4.4 MMOL/L (ref 3.5–5.1)
SODIUM BLD-SCNC: 143 MMOL/L (ref 136–145)
TOTAL PROTEIN: 7.7 G/DL (ref 6.4–8.2)
TRIGL SERPL-MCNC: 92 MG/DL (ref 0–150)
VLDLC SERPL CALC-MCNC: 18 MG/DL

## 2021-02-17 PROCEDURE — 85610 PROTHROMBIN TIME: CPT

## 2021-02-17 PROCEDURE — 36415 COLL VENOUS BLD VENIPUNCTURE: CPT

## 2021-02-17 PROCEDURE — 80053 COMPREHEN METABOLIC PANEL: CPT

## 2021-02-17 PROCEDURE — 80061 LIPID PANEL: CPT

## 2021-02-17 PROCEDURE — 99211 OFF/OP EST MAY X REQ PHY/QHP: CPT

## 2021-02-17 NOTE — PROGRESS NOTES
Paulaerlinda Anthony Couch is a 70 y.o. y/o female with history of Afib, Aortic and Mitral Valve Porcine valve. She presents today for anticoagulation monitoring and adjustment. Pertinent PMH: The aortic and mitral valves are porcine and were replaced at the same time on March 9, 2010 by Dr. Renae Ortiz. Patient Reported Findings:  Yes     No  [x]   []       Patient verifies current dosing regimen as listed  []   [x]       S/S bleeding/bruising/swelling/SOB -denies   []   [x]       Blood in urine or stool -denies   []   [x]       Procedures scheduled in the future at this time    []   [x]       Missed Dose   []   [x]       Extra Dose- denies   []   [x]       Change in medications- denies   [x]   []       Change in health/diet/appetite. Her norm would be spinach, broccoli, shahzad or turnip greens twice a week and salads with iceberg other lower vitamin K -->no changes---> eating greens 2-3 times/week, no NVD --> no changes --> no changes, no NVD ---> Patient reports having shahzad greens 3 times weekly, this is an increase in vitamin K content from previous visits --> no changes.  --> eating less greens, no NVD  []   [x]       Change in alcohol use ---> Denies  []   [x]       Change in activity  []   [x]       Hospital admission  []   [x]       Emergency department visit  []   [x]       Other complaints     Clinical Outcomes:  Yes     No  []   [x]       Major bleeding event  []   [x]       Thromboembolic event    Duration of warfarin Therapy: Indefinitely  INR Range:  2.0-3.0   Keep at 2.5-3.0 per the collaborative agreement from Dr. Josh Ochoa    INR is 1.7 today for unknown reasons  Take 7.5 mg today, then continue taking 2.5 mg on Mon only and 5 mg all other days   Encouraged to maintain a consistency of vegetables/salads   Recheck INR in 4 weeks, 3/17    Referring is Dr. Josefa Purcell   INR (no units)   Date Value   02/17/2021 1.7   01/13/2021 2.2   12/16/2020 1.5   11/03/2020 2.3   04/28/2020 2.0 (H)   02/27/2019

## 2021-02-22 ENCOUNTER — TELEPHONE (OUTPATIENT)
Dept: CARDIOLOGY CLINIC | Age: 76
End: 2021-02-22

## 2021-02-24 ENCOUNTER — HOSPITAL ENCOUNTER (OUTPATIENT)
Dept: WOMENS IMAGING | Age: 76
Discharge: HOME | End: 2021-02-24
Payer: MEDICARE

## 2021-02-24 DIAGNOSIS — Z12.31 BREAST CANCER SCREENING BY MAMMOGRAM: ICD-10-CM

## 2021-02-24 PROCEDURE — 77063 BREAST TOMOSYNTHESIS BI: CPT

## 2021-02-24 NOTE — TELEPHONE ENCOUNTER
Warfarin prescription phoned in to [facility] at [address] [facility] [facility] under Dr. Tanner Pastrana  Warfarin 5 mg tabs  Take 2.5 mg (0.5 tab) on Mondays and 5 mg (1 tab) all other days.    90 days   2 refills    Ricardo Roberto, PharmD  PGY-1 Pharmacy Resident  [id number]

## 2021-03-17 ENCOUNTER — ANTI-COAG VISIT (OUTPATIENT)
Dept: PHARMACY | Age: 76
End: 2021-03-17
Payer: MEDICARE

## 2021-03-17 DIAGNOSIS — I48.20 CHRONIC ATRIAL FIBRILLATION (HCC): ICD-10-CM

## 2021-03-17 DIAGNOSIS — Z95.2 S/P AORTIC VALVE REPLACEMENT: ICD-10-CM

## 2021-03-17 DIAGNOSIS — Z95.2 S/P MITRAL VALVE REPLACEMENT: ICD-10-CM

## 2021-03-17 LAB — INTERNATIONAL NORMALIZATION RATIO, POC: 2.1

## 2021-03-17 PROCEDURE — 85610 PROTHROMBIN TIME: CPT

## 2021-03-17 PROCEDURE — 99211 OFF/OP EST MAY X REQ PHY/QHP: CPT

## 2021-03-17 NOTE — PROGRESS NOTES
Audrey Aguilar Ms. Milagros Del Cid is a 78 y.o. y/o female with history of Afib, Aortic and Mitral Valve Porcine valve. She presents today for anticoagulation monitoring and adjustment. Pertinent PMH: The aortic and mitral valves are porcine and were replaced at the same time on March 9, 2010 by Dr. Carmen Bray. Patient Reported Findings:  Yes     No  [x]   []       Patient verifies current dosing regimen as listed  []   [x]       S/S bleeding/bruising/swelling/SOB -denies   []   [x]       Blood in urine or stool -denies   []   [x]       Procedures scheduled in the future at this time    []   [x]       Missed Dose   []   [x]       Extra Dose- denies   []   [x]       Change in medications- denies   [x]   []       Change in health/diet/appetite. Her norm would be spinach, broccoli, shahzad or turnip greens twice a week and salads with iceberg other lower vitamin K -->no changes---> eating greens 2-3 times/week, no NVD --> no changes --> no changes, no NVD ---> Patient reports having shahzad greens 3 times weekly, this is an increase in vitamin K content from previous visits --> no changes. --> eating less greens, no NVD---> no changes, no NVD  []   [x]       Change in alcohol use ---> Denies  []   [x]       Change in activity  []   [x]       Hospital admission  []   [x]       Emergency department visit  []   [x]       Other complaints     Clinical Outcomes:  Yes     No  []   [x]       Major bleeding event  []   [x]       Thromboembolic event    Duration of warfarin Therapy: Indefinitely  INR Range:  2.0-3.0   Keep at 2.5-3.0 per the collaborative agreement from Dr. Rony Ramirez    INR is 2.1 today   Continue taking 2.5 mg on Mon only and 5 mg all other days   Encouraged to maintain a consistency of vegetables/salads- explained to keep it 1-2 times/week.  If wants to add in more greens at next visit then increase to 5mg daily   Recheck INR in 4 weeks, 4/14    Referring is Dr. Lu Enriquez   INR (no units)   Date Value   03/17/2021 2.1   02/17/2021 1.7   01/13/2021 2.2   12/16/2020 1.5   04/28/2020 2.0 (H)   02/27/2019 1.11   09/17/2018 4   08/20/2018 2.8     CLINICAL PHARMACY CONSULT: MED RECONCILIATION/REVIEW ADDENDUM    For Pharmacy Admin Tracking Only    PHSO: No  Total # of Interventions Recommended: 0  - Maintenance Safety Lab Monitoring #: 1  Total Interventions Accepted: 0  Time Spent (min): [address] Gian García, PharmD

## 2021-04-14 ENCOUNTER — ANTI-COAG VISIT (OUTPATIENT)
Dept: PHARMACY | Age: 76
End: 2021-04-14
Payer: MEDICARE

## 2021-04-14 DIAGNOSIS — I48.20 CHRONIC ATRIAL FIBRILLATION (HCC): ICD-10-CM

## 2021-04-14 DIAGNOSIS — Z95.2 S/P MITRAL VALVE REPLACEMENT: ICD-10-CM

## 2021-04-14 DIAGNOSIS — Z95.2 S/P AORTIC VALVE REPLACEMENT: ICD-10-CM

## 2021-04-14 LAB — INTERNATIONAL NORMALIZATION RATIO, POC: 3

## 2021-04-14 PROCEDURE — 85610 PROTHROMBIN TIME: CPT

## 2021-04-14 PROCEDURE — 99211 OFF/OP EST MAY X REQ PHY/QHP: CPT

## 2021-04-14 NOTE — PROGRESS NOTES
04/28/2020 2.0 (H)   02/27/2019 1.11   09/17/2018 4   08/20/2018 2.8     CLINICAL PHARMACY CONSULT: MED RECONCILIATION/REVIEW ADDENDUM    For Pharmacy Admin Tracking Only    PHSO: No  Total # of Interventions Recommended: 0  - Maintenance Safety Lab Monitoring #: 1  Total Interventions Accepted: 0  Time Spent (min): [address] Gian García PharmD

## 2021-05-12 ENCOUNTER — HOSPITAL ENCOUNTER (OUTPATIENT)
Age: 76
Discharge: HOME | End: 2021-05-12
Payer: MEDICARE

## 2021-05-12 ENCOUNTER — ANTI-COAG VISIT (OUTPATIENT)
Dept: PHARMACY | Age: 76
End: 2021-05-12
Payer: MEDICARE

## 2021-05-12 DIAGNOSIS — I48.20 CHRONIC ATRIAL FIBRILLATION (HCC): ICD-10-CM

## 2021-05-12 DIAGNOSIS — Z95.2 S/P AORTIC VALVE REPLACEMENT: ICD-10-CM

## 2021-05-12 DIAGNOSIS — Z95.2 S/P MITRAL VALVE REPLACEMENT: ICD-10-CM

## 2021-05-12 LAB
A/G RATIO: 1.5 (ref 1.1–2.2)
ALBUMIN SERPL-MCNC: 4.4 G/DL (ref 3.4–5)
ALP BLD-CCNC: 83 U/L (ref 40–129)
ALT SERPL-CCNC: 13 U/L (ref 10–40)
ANION GAP SERPL CALCULATED.3IONS-SCNC: 12 MMOL/L (ref 3–16)
AST SERPL-CCNC: 21 U/L (ref 15–37)
BACTERIA: ABNORMAL /HPF
BILIRUB SERPL-MCNC: 0.4 MG/DL (ref 0–1)
BILIRUBIN URINE: NEGATIVE
BLOOD, URINE: ABNORMAL
BUN BLDV-MCNC: 11 MG/DL (ref 7–20)
CALCIUM SERPL-MCNC: 9.2 MG/DL (ref 8.3–10.6)
CHLORIDE BLD-SCNC: 106 MMOL/L (ref 99–110)
CHOLESTEROL, TOTAL: 127 MG/DL (ref 0–199)
CLARITY: ABNORMAL
CO2: 27 MMOL/L (ref 21–32)
COLOR: YELLOW
COMMENT UA: ABNORMAL
CREAT SERPL-MCNC: 0.9 MG/DL (ref 0.6–1.2)
GFR AFRICAN AMERICAN: >60
GFR NON-AFRICAN AMERICAN: >60
GLOBULIN: 3 G/DL
GLUCOSE BLD-MCNC: 91 MG/DL (ref 70–99)
GLUCOSE URINE: NEGATIVE MG/DL
HCT VFR BLD CALC: 37.1 % (ref 36–48)
HDLC SERPL-MCNC: 46 MG/DL (ref 40–60)
HEMOGLOBIN: 12.2 G/DL (ref 12–16)
INTERNATIONAL NORMALIZATION RATIO, POC: 2.2
KETONES, URINE: NEGATIVE MG/DL
LDL CHOLESTEROL CALCULATED: 65 MG/DL
LEUKOCYTE ESTERASE, URINE: ABNORMAL
MCH RBC QN AUTO: 29.1 PG (ref 26–34)
MCHC RBC AUTO-ENTMCNC: 32.9 G/DL (ref 31–36)
MCV RBC AUTO: 88.3 FL (ref 80–100)
MICROSCOPIC EXAMINATION: YES
NITRITE, URINE: NEGATIVE
PDW BLD-RTO: 14.3 % (ref 12.4–15.4)
PH UA: 6.5 (ref 5–8)
PLATELET # BLD: 187 K/UL (ref 135–450)
PMV BLD AUTO: 9.6 FL (ref 5–10.5)
POTASSIUM SERPL-SCNC: 4.3 MMOL/L (ref 3.5–5.1)
PROTEIN UA: 30 MG/DL
RBC # BLD: 4.2 M/UL (ref 4–5.2)
RBC UA: ABNORMAL /HPF (ref 0–4)
SODIUM BLD-SCNC: 145 MMOL/L (ref 136–145)
SPECIFIC GRAVITY UA: 1.02 (ref 1–1.03)
TOTAL PROTEIN: 7.4 G/DL (ref 6.4–8.2)
TRIGL SERPL-MCNC: 82 MG/DL (ref 0–150)
TSH SERPL DL<=0.05 MIU/L-ACNC: 3.47 UIU/ML (ref 0.27–4.2)
URINE TYPE: ABNORMAL
UROBILINOGEN, URINE: 0.2 E.U./DL
VITAMIN D 25-HYDROXY: 43 NG/ML
VLDLC SERPL CALC-MCNC: 16 MG/DL
WBC # BLD: 5.2 K/UL (ref 4–11)
WBC UA: ABNORMAL /HPF (ref 0–5)

## 2021-05-12 PROCEDURE — 80061 LIPID PANEL: CPT

## 2021-05-12 PROCEDURE — 80053 COMPREHEN METABOLIC PANEL: CPT

## 2021-05-12 PROCEDURE — 85027 COMPLETE CBC AUTOMATED: CPT

## 2021-05-12 PROCEDURE — 85610 PROTHROMBIN TIME: CPT

## 2021-05-12 PROCEDURE — 81001 URINALYSIS AUTO W/SCOPE: CPT

## 2021-05-12 PROCEDURE — 82306 VITAMIN D 25 HYDROXY: CPT

## 2021-05-12 PROCEDURE — 84443 ASSAY THYROID STIM HORMONE: CPT

## 2021-05-12 PROCEDURE — 99211 OFF/OP EST MAY X REQ PHY/QHP: CPT

## 2021-05-12 NOTE — PROGRESS NOTES
Tc Albright is a 78 y.o. y/o female with history of Afib, Aortic and Mitral Valve Porcine valve. She presents today for anticoagulation monitoring and adjustment. Pertinent PMH: The aortic and mitral valves are porcine and were replaced at the same time on March 9, 2010 by Dr. Fannie Carlin. Patient Reported Findings:  Yes     No  [x]   []       Patient verifies current dosing regimen as listed  []   [x]       S/S bleeding/bruising/swelling/SOB -denies   []   [x]       Blood in urine or stool -denies   []   [x]       Procedures scheduled in the future at this time    []   [x]       Missed Dose   []   [x]       Extra Dose   []   [x]       Change in medications- denies   [x]   []       Change in health/diet/appetite. Her norm would be spinach, broccoli, shahzad or turnip greens twice a week and salads with iceberg other lower vitamin K -->no changes---> eating greens 2-3 times/week, no NVD --> no changes --> no changes, no NVD ---> Patient reports having shahzad greens 3 times weekly, this is an increase in vitamin K content from previous visits --> no changes. --> eating less greens, no NVD---> no changes, no NVD---> erratic, no NVD  []   [x]       Change in alcohol use ---> Denies  []   [x]       Change in activity  []   [x]       Hospital admission  []   [x]       Emergency department visit  []   [x]       Other complaints     Clinical Outcomes:  Yes     No  []   [x]       Major bleeding event  []   [x]       Thromboembolic event    Duration of warfarin Therapy: Indefinitely  INR Range:  2.0-3.0   Keep at 2.5-3.0 per the collaborative agreement from Dr. Megan Bravo    INR is 2.2 today  Continue taking 2.5 mg on Mon only and 5 mg all other days. Encouraged to maintain a consistency of vegetables/salads.   Refill called into [facility]  Recheck INR in 4 weeks, 6/9  If okay at next visit, go to 6 weeks    Referring is Dr. Aly Arango   INR (no units)   Date Value   05/12/2021 2.2   04/14/2021 3.0   03/17/2021 2.1   02/17/2021 1.7   04/28/2020 2.0 (H)   02/27/2019 1.11   09/17/2018 4   08/20/2018 2.8     For Pharmacy Admin Tracking Only     Intervention Detail: Refill(s) Provided   Total # of Interventions Recommended: 1   Total # of Interventions Accepted: 1   Time Spent (min): 15    Clarisse Fermin, TristonD

## 2021-06-09 ENCOUNTER — ANTI-COAG VISIT (OUTPATIENT)
Dept: PHARMACY | Age: 76
End: 2021-06-09
Payer: MEDICARE

## 2021-06-09 DIAGNOSIS — Z95.2 S/P MITRAL VALVE REPLACEMENT: ICD-10-CM

## 2021-06-09 DIAGNOSIS — Z95.2 S/P AORTIC VALVE REPLACEMENT: Primary | ICD-10-CM

## 2021-06-09 DIAGNOSIS — I48.20 CHRONIC ATRIAL FIBRILLATION (HCC): ICD-10-CM

## 2021-06-09 LAB — INTERNATIONAL NORMALIZATION RATIO, POC: 2.7

## 2021-06-09 PROCEDURE — 99211 OFF/OP EST MAY X REQ PHY/QHP: CPT

## 2021-06-09 PROCEDURE — 85610 PROTHROMBIN TIME: CPT

## 2021-06-09 NOTE — PROGRESS NOTES
Dillon Harrison Ms. Jessie Lopez is a 78 y.o. y/o female with history of Afib, Aortic and Mitral Valve Porcine valve. She presents today for anticoagulation monitoring and adjustment. Pertinent PMH: The aortic and mitral valves are porcine and were replaced at the same time on March 9, 2010 by Dr. Paulo Bailey. Patient Reported Findings:  Yes     No  [x]   []       Patient verifies current dosing regimen as listed  []   [x]       S/S bleeding/bruising/swelling/SOB -denies   []   [x]       Blood in urine or stool -denies   []   [x]       Procedures scheduled in the future at this time    []   [x]       Missed Dose   []   [x]       Extra Dose   []   [x]       Change in medications- denies   [x]   []       Change in health/diet/appetite. Her norm would be spinach, broccoli, shahzad or turnip greens twice a week and salads with iceberg other lower vitamin K -->no changes---> eating greens 2-3 times/week, no NVD --> no changes --> no changes, no NVD ---> Patient reports having shahzad greens 3 times weekly, this is an increase in vitamin K content from previous visits --> no changes. --> eating less greens, no NVD---> no changes, no NVD---> erratic, no NVD  []   [x]       Change in alcohol use ---> Denies  []   [x]       Change in activity  []   [x]       Hospital admission  []   [x]       Emergency department visit  []   [x]       Other complaints     Clinical Outcomes:  Yes     No  []   [x]       Major bleeding event  []   [x]       Thromboembolic event    Duration of warfarin Therapy: Indefinitely  INR Range:  2.0-3.0   Keep at 2.5-3.0 per the collaborative agreement from Dr. Eunice Ruiz    INR is 2.7 today  Continue taking 2.5 mg on Mon only and 5 mg all other days. Encouraged to maintain a consistency of vegetables/salads.   Recheck INR in 6 weeks, 7/19    Referring is Dr. Rosalinda Tomlinson   INR (no units)   Date Value   06/09/2021 2.7   05/12/2021 2.2   04/14/2021 3.0   03/17/2021 2.1   04/28/2020 2.0 (H)   02/27/2019 1.11 09/17/2018 4   08/20/2018 2.8     For Pharmacy Admin Tracking Only     Total # of Interventions Recommended: 0   Total # of Interventions Accepted: 0   Time Spent (min): [address] Gian García, [facility], PharmD

## 2021-07-19 ENCOUNTER — ANTI-COAG VISIT (OUTPATIENT)
Dept: PHARMACY | Age: 76
End: 2021-07-19
Payer: MEDICARE

## 2021-07-19 DIAGNOSIS — Z95.2 S/P MITRAL VALVE REPLACEMENT: ICD-10-CM

## 2021-07-19 DIAGNOSIS — I48.20 CHRONIC ATRIAL FIBRILLATION (HCC): ICD-10-CM

## 2021-07-19 DIAGNOSIS — Z95.2 S/P AORTIC VALVE REPLACEMENT: Primary | ICD-10-CM

## 2021-07-19 LAB — INTERNATIONAL NORMALIZATION RATIO, POC: 2.4

## 2021-07-19 PROCEDURE — 99211 OFF/OP EST MAY X REQ PHY/QHP: CPT

## 2021-07-19 PROCEDURE — 85610 PROTHROMBIN TIME: CPT

## 2021-07-19 NOTE — PROGRESS NOTES
Jocelynn Dutton is a 78 y.o. y/o female with history of Afib, Aortic and Mitral Valve Porcine valve. She presents today for anticoagulation monitoring and adjustment. Pertinent PMH: The aortic and mitral valves are porcine and were replaced at the same time on March 9, 2010 by Dr. Tomer Koch. Patient Reported Findings:  Yes     No  [x]   []       Patient verifies current dosing regimen as listed  []   [x]       S/S bleeding/bruising/swelling/SOB -denies   []   [x]       Blood in urine or stool -denies   []   [x]       Procedures scheduled in the future at this time    []   [x]       Missed Dose   []   [x]       Extra Dose   [x]   []       Change in medications- ran out of glucosamine chondroitin but plans to resume    []   [x]       Change in health/diet/appetite. Her norm would be spinach, broccoli, shahzad or turnip greens twice a week and salads with iceberg other lower vitamin K --> Patient reports having shahzad greens 3 times weekly, this is an increase in vitamin K content from previous visits --> eating less greens, no NVD---> no changes, no NVD---> erratic, no NVD  []   [x]       Change in alcohol use ---> Denies  []   [x]       Change in activity  []   [x]       Hospital admission  []   [x]       Emergency department visit  []   [x]       Other complaints     Clinical Outcomes:  Yes     No  []   [x]       Major bleeding event  []   [x]       Thromboembolic event    Duration of warfarin Therapy: Indefinitely  INR Range:  2.0-3.0   Keep at 2.5-3.0 per the collaborative agreement from Dr. Leonora Anderson    INR is 2.4 today  Continue taking 2.5 mg on Mon only and 5 mg all other days. Encouraged to maintain a consistency of vegetables/salads.   Recheck INR in 6 weeks, 8/30    Referring is Dr. Montse Ferguson   INR (no units)   Date Value   06/09/2021 2.7   05/12/2021 2.2   04/14/2021 3.0   03/17/2021 2.1   04/28/2020 2.0 (H)   02/27/2019 1.11   09/17/2018 4   08/20/2018 2.8     For Pharmacy Admin Tracking Only     Total # of Interventions Recommended: 0   Total # of Interventions Accepted: 0   Time Spent (min): Jennifer Chavarria, [facility], PharmD

## 2021-07-26 ENCOUNTER — TELEPHONE (OUTPATIENT)
Dept: ORTHOPEDIC SURGERY | Age: 76
End: 2021-07-26

## 2021-07-26 NOTE — TELEPHONE ENCOUNTER
=================[id number]=================  TakenN 02:50:14 am 21 Jose Pittman  PTS  NAME:SAME  REG  DR:  CHAPO ARENASB: [birth year]   PH#:[phone] / [phone]  MSG: PLEASE CALL ON MONDAY, SHE  NEEDS TO GET A CORTISONE  SHOT IN  HER RIGHT HAND.     E/R R    ** ALL FIELDS ARE MANDATORY    **

## 2021-07-28 ENCOUNTER — OFFICE VISIT (OUTPATIENT)
Dept: CARDIOLOGY CLINIC | Age: 76
End: 2021-07-28
Payer: MEDICARE

## 2021-07-28 VITALS
WEIGHT: 192 LBS | BODY MASS INDEX: 36.25 KG/M2 | DIASTOLIC BLOOD PRESSURE: 80 MMHG | OXYGEN SATURATION: 99 % | HEART RATE: 73 BPM | HEIGHT: 61 IN | SYSTOLIC BLOOD PRESSURE: 124 MMHG

## 2021-07-28 DIAGNOSIS — E78.5 HYPERLIPIDEMIA, UNSPECIFIED HYPERLIPIDEMIA TYPE: Chronic | ICD-10-CM

## 2021-07-28 DIAGNOSIS — I48.20 CHRONIC ATRIAL FIBRILLATION (HCC): Primary | ICD-10-CM

## 2021-07-28 DIAGNOSIS — Z95.2 S/P MITRAL VALVE REPLACEMENT: Chronic | ICD-10-CM

## 2021-07-28 DIAGNOSIS — Z95.2 S/P AORTIC VALVE REPLACEMENT: Chronic | ICD-10-CM

## 2021-07-28 PROCEDURE — G8417 CALC BMI ABV UP PARAM F/U: HCPCS | Performed by: INTERNAL MEDICINE

## 2021-07-28 PROCEDURE — 1090F PRES/ABSN URINE INCON ASSESS: CPT | Performed by: INTERNAL MEDICINE

## 2021-07-28 PROCEDURE — 99214 OFFICE O/P EST MOD 30 MIN: CPT | Performed by: INTERNAL MEDICINE

## 2021-07-28 PROCEDURE — 1036F TOBACCO NON-USER: CPT | Performed by: INTERNAL MEDICINE

## 2021-07-28 PROCEDURE — G8427 DOCREV CUR MEDS BY ELIG CLIN: HCPCS | Performed by: INTERNAL MEDICINE

## 2021-07-28 PROCEDURE — 4040F PNEUMOC VAC/ADMIN/RCVD: CPT | Performed by: INTERNAL MEDICINE

## 2021-07-28 PROCEDURE — 1123F ACP DISCUSS/DSCN MKR DOCD: CPT | Performed by: INTERNAL MEDICINE

## 2021-07-28 PROCEDURE — G8400 PT W/DXA NO RESULTS DOC: HCPCS | Performed by: INTERNAL MEDICINE

## 2021-07-28 PROCEDURE — 3017F COLORECTAL CA SCREEN DOC REV: CPT | Performed by: INTERNAL MEDICINE

## 2021-07-28 RX ORDER — CARVEDILOL 6.25 MG/1
TABLET ORAL
Qty: 180 TABLET | Refills: 3 | Status: SHIPPED | OUTPATIENT
Start: 2021-07-28 | End: 2022-09-13

## 2021-07-28 RX ORDER — SIMVASTATIN 40 MG
40 TABLET ORAL NIGHTLY
Qty: 90 TABLET | Refills: 3 | Status: SHIPPED | OUTPATIENT
Start: 2021-07-28 | End: 2022-09-07

## 2021-07-28 RX ORDER — LATANOPROST 50 UG/ML
SOLUTION/ DROPS OPHTHALMIC
COMMUNITY
Start: 2021-06-07

## 2021-07-28 NOTE — PROGRESS NOTES
[address]   Cardiac Evaluation      Patient: Sharlene Ferreira  YOB: 1945  Date: 7/28/21       No chief complaint on file. Referring provider: Jacqueline Renee MD    History of Present Illness:   Ms Narcisa Pearson is seen today in follow up. History includes porcine aortic/mitral valve replacements in 2010. She has hyperlipidemia and atrial flutter. Becky Mejia had rheumatic fever at the age of 17. She is retired from [employer]. Today, Ms Narcisa Pearson states she has been fine. She states she is tired because she stays up too late. Becky Mejia states \"I sleep like a rock\". She snores and falls asleep if she sits to watch TV. She exercises 3 days a week at a gym and mows her lawn with push mower. She denies chest pain, palpitations, BUENROSTRO, dizziness, or edema. Past Medical History:   has a past medical history of Aortic valve disease, rheumatic, Atrial flutter ([address] Hypercholesteremia, and Mitral Valve Disease. Surgical History:   has a past surgical history that includes Breast biopsy; Dilation and curettage of uterus; Cardioversion (2/4/13); Colonoscopy; Aortic valve replacement (03/2010); Mitral valve replacement (03/2010); and Breast biopsy (Right, 2/27/2019).      Current Outpatient Medications   Medication Sig Dispense Refill    carvedilol (COREG) 6.25 MG tablet TAKE 1 TABLET TWICE DAILY WITH FOOD 180 tablet 3    simvastatin (ZOCOR) 40 MG tablet Take 1 tablet by mouth nightly 90 tablet 3    Cholecalciferol (VITAMIN D3) 2000 units CAPS Take by mouth      Probiotic Product (PROBIOTIC DAILY PO) Take by mouth daily      warfarin (COUMADIN) 5 MG tablet One tab po nightly and on Friday 1 1/2 tab 30 tablet 3    Multiple Vitamins-Minerals (CENTRUM SILVER) TABS Take 1 tablet by mouth as needed       latanoprost (XALATAN) 0.005 % ophthalmic solution INSTILL 1 DROP INTO BOTH EYES EVERY EVENING AS DIRECTED      acetaminophen (TYLENOL) 500 MG tablet Take 500 mg by mouth every 6 hours as needed for Pain       No current facility-administered medications for this visit. Social History:  Social History     Socioeconomic History    Marital status:      Spouse name: Not on file    Number of children: Not on file    Years of education: Not on file    Highest education level: Not on file   Occupational History    Not on file   Tobacco Use    Smoking status: Never Smoker    Smokeless tobacco: Never Used   Vaping Use    Vaping Use: Never used   Substance and Sexual Activity    Alcohol use: Yes     Alcohol/week: 0.0 standard drinks     Comment: rare    Drug use: No    Sexual activity: Not on file   Other Topics Concern    Not on file   Social History Narrative    Retired   , non smoker , non drinker , 2 children  Now a       Social Determinants of Health     Financial Resource Strain:     Difficulty of Paying Living Expenses:    Food Insecurity:     Worried About Running Out of Food in the Last Year:     [address] in the Last Year:    Transportation Needs:     Lack of Transportation (Medical):  Lack of Transportation (Non-Medical):    Physical Activity:     Days of Exercise per Week:     Minutes of Exercise per Session:    Stress:     Feeling of Stress :    Social Connections:     Frequency of Communication with Friends and Family:     Frequency of Social Gatherings with Friends and Family:     Attends Confucianism Services:     Active Member of Clubs or Organizations:     Attends Club or Organization Meetings:     Marital Status:    Intimate Partner Violence:     Fear of Current or Ex-Partner:     Emotionally Abused:     Physically Abused:     Sexually Abused:        Family History:  family history includes Cancer in her maternal aunt; Diabetes in her mother; Heart Disease in her mother. Allergies:  Patient has no known allergies. Review of Systems:   · Constitutional: there has been no unanticipated weight loss.  No change in energy or activity level   · Eyes: No visual changes   · ENT: No Headaches, hearing loss or vertigo. No mouth sores or sore throat. · Cardiovascular: Reviewed in HPI  · Respiratory: No cough or wheezing, no sputum production. · Gastrointestinal: No abdominal pain, appetite loss, blood in stools. No change in bowel or bladder habits. · Genitourinary: No nocturia, dysuria, trouble voiding  · Musculoskeletal:  No gait disturbance, weakness or joint complaints. · Integumentary: No rash or pruritis. · Neurological: No headache, change in muscle strength, numbness or tingling. No change in gait, balance, coordination, mood, affect, memory, mentation, behavior. · Psychiatric: No anxiety or depression  · Endocrine: No malaise or fever  · Hematologic/Lymphatic: No abnormal bruising or bleeding, blood clots or swollen lymph nodes. · Allergic/Immunologic: No nasal congestion or hives. Physical Examination:    Vitals:    07/28/21 1147   BP: 124/80   Site: Left Upper Arm   Position: Sitting   Cuff Size: Medium Adult   Pulse: 73   SpO2: 99%   Weight: 192 lb (87.1 kg)   Height: 5' 1\" (1.549 m)     Body mass index is 36.28 kg/m².      Wt Readings from Last 3 Encounters:   07/28/21 192 lb (87.1 kg)   01/29/21 197 lb (89.4 kg)   07/30/20 194 lb (88 kg)      BP Readings from Last 3 Encounters:   07/28/21 124/80   01/29/21 130/80   07/30/20 110/60      Constitutional and General Appearance:  appears younger than stated age, always happy and very pleasant  Eyes - no xanthelasma  Respiratory:  · Normal excursion and expansion without use of accessory muscles  · Resp Auscultation: Normal breath sounds without dullness  Cardiovascular:  · The apical impulses not displaced  · Heart is regular rate and rhythm with normal S1, S2 (tissue valves) , short 2/6 SM  · PMI is normal  · The carotid upstroke is normal, no bruit noted   · JVP is not elevated  · Peripheral pulses are symmetrical  · There is no clubbing, cyanosis of the extremities  · No edema  · No varicosities  · Femoral Arteries: 2+ and equal without bruits  · Pedal Pulses: 2+ and equal   Abdomen:  · No masses or tenderness  · Aorta not palpable  · Normal bowel sounds  Neurological/Psychiatric:  · Alert and oriented x3  · Moves all extremities well  · Exhibits normal gait balance and coordination      Assessment/Plan  1. atrial fibrillation/flutter (HCC)she has flutter chronically. coumadin followed [facility]R clinic [facility]  5/16 Holter-- showed atrial flutter with AHR 93, few PVC's  9/17/12 Holter--Afib (100%), HR  ( avg 101) with PVCs. 2. S/P aortic valve replacement   Echo 8/28/20: EF 60%. Grade II DD, A bioprosthetic mitral valve is well seated with peak velocity of 2.3m/s and a mean gradient of 7mmHg. Mild mitral regurgitation. A bioprosthetic artificial aortic valve appears well seated with a maximum velocity of 2.5m/s and a mean gradient of 16mmHg. Moderate tricuspid regurgitation. Estimated pulmonary artery systolic pressure is mildly to moderately elevated at 42 mmHg assuming a right atrial pressure of 3 mmHg. The left atrium is dilated. Echo 4/17/14: EF 55%. reversal of E/A inflow velocities across the mitral valve suggesting impaired left ventricular relaxation. v A prosthetic mitral valve is well seated with maximum gradient of 15 mmHg and a mean gradient of 6 mmHg. Mild mitral regurgitation is present. The left atrium is dilated. A prosthetic aortic valve appears well seated with a maximum gradient of 24mmHg and a mean gradient of 15 mmHg. No evidence of aortic valve regurgitation. mild to moderate tricuspid regurgitation with RVSP estimated at 41mmHg. Mild pulmonic regurgitation present   3. S/P mitral valve replacement -stable, see above    4. Hyperlipidemia, unspecified hyperlipidemia type -well controlled on labs 5/12/21: ; TRIG 82; HDL 46; LDL 65       PLAN:  She appears stable. No med changes. FU 6 months.      Thank you for allowing to me to participate in the care of Zeke Vivar. Scribe's attestation: This note was scribed in the presence of Dr Marcelina Vazquez MD by Herminio Molina RN. The scribe's documentation has been prepared under my direction and personally reviewed by me in its entirety. I confirm that the note above accurately reflects all work, treatment, procedures, and medical decision making performed by me.

## 2021-08-30 ENCOUNTER — ANTI-COAG VISIT (OUTPATIENT)
Dept: PHARMACY | Age: 76
End: 2021-08-30
Payer: MEDICARE

## 2021-08-30 DIAGNOSIS — Z95.2 S/P AORTIC VALVE REPLACEMENT: Primary | ICD-10-CM

## 2021-08-30 DIAGNOSIS — Z95.2 S/P MITRAL VALVE REPLACEMENT: ICD-10-CM

## 2021-08-30 DIAGNOSIS — I48.20 CHRONIC ATRIAL FIBRILLATION (HCC): ICD-10-CM

## 2021-08-30 LAB — INTERNATIONAL NORMALIZATION RATIO, POC: 2.6

## 2021-08-30 PROCEDURE — 99211 OFF/OP EST MAY X REQ PHY/QHP: CPT

## 2021-08-30 PROCEDURE — 85610 PROTHROMBIN TIME: CPT

## 2021-08-30 NOTE — PROGRESS NOTES
[facility] Ms. Eugenie Naranjo is a 78 y.o. y/o female with history of Afib, Aortic and Mitral Valve Porcine valve. She presents today for anticoagulation monitoring and adjustment. Pertinent PMH: The aortic and mitral valves are porcine and were replaced at the same time on March 9, 2010 by Dr. Lilly Vazquez. Patient Reported Findings:  Yes     No  [x]   []       Patient verifies current dosing regimen as listed  []   [x]       S/S bleeding/bruising/swelling/SOB -denies   []   [x]       Blood in urine or stool -denies   []   [x]       Procedures scheduled in the future at this time    []   [x]       Missed Dose   []   [x]       Extra Dose   [x]   []       Change in medications- ran out of glucosamine chondroitin but plans to resume---> denies     []   [x]       Change in health/diet/appetite. Her norm would be spinach, broccoli, shahzad or turnip greens twice a week and salads with iceberg other lower vitamin K --> Patient reports having shahzad greens 3 times weekly, this is an increase in vitamin K content from previous visits --> eating less greens, no NVD---> no changes, no NVD---> erratic, no NVD  []   [x]       Change in alcohol use ---> Denies  []   [x]       Change in activity  []   [x]       Hospital admission  []   [x]       Emergency department visit  []   [x]       Other complaints     Clinical Outcomes:  Yes     No  []   [x]       Major bleeding event  []   [x]       Thromboembolic event    Duration of warfarin Therapy: Indefinitely  INR Range:  2.0-3.0   Keep at 2.5-3.0 per the collaborative agreement from Dr. Snehal Valdez    INR is 2.6 today  Continue taking 2.5 mg on Mon only and 5 mg all other days. Encouraged to maintain a consistency of vegetables/salads. RF called into [facility].   Recheck INR in 6 weeks, 10/11    Referring is Dr. Adenike Morrow   INR (no units)   Date Value   08/30/2021 2.6   07/19/2021 2.4   06/09/2021 2.7   05/12/2021 2.2   04/28/2020 2.0 (H)   02/27/2019 1.11   09/17/2018 4   08/20/2018 2.8   For Pharmacy Admin Tracking Only     Intervention Detail: Refill(s) Provided   Total # of Interventions Recommended: 1   Total # of Interventions Accepted: 1   Time Spent (min): 48352 Nneka Isbell [facility], PharmD

## 2021-09-08 ENCOUNTER — OFFICE VISIT (OUTPATIENT)
Dept: ORTHOPEDIC SURGERY | Age: 76
End: 2021-09-08
Payer: MEDICARE

## 2021-09-08 VITALS — RESPIRATION RATE: 16 BRPM | BODY MASS INDEX: 36.25 KG/M2 | HEIGHT: 61 IN | WEIGHT: 192 LBS

## 2021-09-08 DIAGNOSIS — M65.4 DE QUERVAIN'S DISEASE (RADIAL STYLOID TENOSYNOVITIS): Primary | ICD-10-CM

## 2021-09-08 PROCEDURE — G8417 CALC BMI ABV UP PARAM F/U: HCPCS | Performed by: PHYSICIAN ASSISTANT

## 2021-09-08 PROCEDURE — L3908 WHO COCK-UP NONMOLDE PRE OTS: HCPCS | Performed by: PHYSICIAN ASSISTANT

## 2021-09-08 PROCEDURE — 1090F PRES/ABSN URINE INCON ASSESS: CPT | Performed by: PHYSICIAN ASSISTANT

## 2021-09-08 PROCEDURE — 99203 OFFICE O/P NEW LOW 30 MIN: CPT | Performed by: PHYSICIAN ASSISTANT

## 2021-09-08 PROCEDURE — G8400 PT W/DXA NO RESULTS DOC: HCPCS | Performed by: PHYSICIAN ASSISTANT

## 2021-09-08 PROCEDURE — 1036F TOBACCO NON-USER: CPT | Performed by: PHYSICIAN ASSISTANT

## 2021-09-08 PROCEDURE — G8427 DOCREV CUR MEDS BY ELIG CLIN: HCPCS | Performed by: PHYSICIAN ASSISTANT

## 2021-09-08 PROCEDURE — 4040F PNEUMOC VAC/ADMIN/RCVD: CPT | Performed by: PHYSICIAN ASSISTANT

## 2021-09-08 PROCEDURE — 3017F COLORECTAL CA SCREEN DOC REV: CPT | Performed by: PHYSICIAN ASSISTANT

## 2021-09-08 PROCEDURE — 1123F ACP DISCUSS/DSCN MKR DOCD: CPT | Performed by: PHYSICIAN ASSISTANT

## 2021-09-08 NOTE — PROGRESS NOTES
Ms. David Worthington is a 78 y.o. left handed woman  who is seen today in Hand Surgical Consultation at the request of Kelle Magdaleno MD.    She presents today regarding right wrist symptoms which have been present for approximately 3 months. A history of antecedent trauma or injury is Absent. She reports symptoms to include mild pain along the  right  Radial and Dorsal wrist and distal forearm. Wrist symptoms are worse with certain twisting or gripping activities. Symptoms are Daily worse with use. She reports moderate pain with thumb extension or pinch. Symptoms are worsening over time. Previous treatment has included no prior treatments used. She does not claim relation of her symptoms to her required work activities. She has not undergone any form of testing. I have today reviewed with David Worthington the clinically relevant, past medical history, medications, allergies,  family history, social history, and Review Of Systems & I have documented any details relevant to today's presenting complaints in my history above. Ms. Randal Negrete self-reported past medical history, medications, allergies,  family history, social history, and Review Of Systems have been scanned into the chart under the \"Media\" tab. Physical Exam:  Ms. Randal Peña's most recent vitals:  Vitals  Resp: 16  Height: 5' 1\" (154.9 cm)  Weight: 192 lb (87.1 kg)    She is well nourished, oriented to person, place & time. She demonstrates appropriate mood and affect as well as normal gait and station. Skin: Normal in appearance, Normal Color and Free of Lesions Bilaterally   Digital range of motion is limited by pain on the Right, normal on the Left. Pain accompanies the flexion and extension of the right Thumb. There is not triggering associated with active thumb motion.   There is a palpable mass overlying the site of maximal tenderness right radial wrist.  Wrist range of motion is limited by pain and is accompanied by mild pain in dorsiflexion greater than palmar flexion. There is no evidence of gross joint instability bilaterally. Sensation is normal in the Median, Ulnar and Radial Sensory distribution bilaterally  Vascular examination reveals normal, good capillary refill and good color bilaterally   Swelling is mild along the radial margin of the wrist on the Right, normal on the Left  Muscular strength is clinically appropriate bilaterally. Examination of the right first dorsal extensor compartment of the wrist demonstrates little thickening and fullness. There is moderate tenderness to palpation over the extensor tendons. There is moderate pain with resisted thumb extension, and Finklestein's maneuver is positive right side. Wrist range of motion is accompanied by mild pain in dorsiflexion greater than palmar flexion. Examination for Stenosing Tenosynovitis demonstrates no evidence of tenderness, thickening or nodularity at the A-1 pulleys of the digits bilaterally. There no palpable triggering or any finger or thumb. Impression:  Ms. Zeke Vivar is showing clinical evidence of DeQuervain's Tendonitis and presents requesting further treatment. Plan  I have had a thorough discussion with Ms. Zeke Vivar regarding the treatment options available for her initially presenting right  DeQuervain's Tenosynovitis, which is causing her significant symptoms and difficulty. I have outlined for Ms. Zeke Vivar the risk, benefits and consequences of the various treatment modalities, including a reasonable expectation for the long term success of each. We have discussed the likelihood that further, more aggressive treatment may be required for her current presenting condition.   Based upon our current discussion and a reasonable understating of the options available to her, Ms. Zeke Vivar has selected to proceed with a conservative plan of treatment consisting of: the use of protective splints, activity modification,

## 2021-10-11 ENCOUNTER — TELEPHONE (OUTPATIENT)
Dept: PHARMACY | Age: 76
End: 2021-10-11

## 2021-10-13 ENCOUNTER — ANTI-COAG VISIT (OUTPATIENT)
Dept: PHARMACY | Age: 76
End: 2021-10-13
Payer: MEDICARE

## 2021-10-13 DIAGNOSIS — Z95.2 S/P AORTIC VALVE REPLACEMENT: Primary | ICD-10-CM

## 2021-10-13 DIAGNOSIS — Z95.2 S/P MITRAL VALVE REPLACEMENT: ICD-10-CM

## 2021-10-13 DIAGNOSIS — I48.20 CHRONIC ATRIAL FIBRILLATION (HCC): ICD-10-CM

## 2021-10-13 LAB — INTERNATIONAL NORMALIZATION RATIO, POC: 3.7

## 2021-10-13 PROCEDURE — 99211 OFF/OP EST MAY X REQ PHY/QHP: CPT

## 2021-10-13 PROCEDURE — 85610 PROTHROMBIN TIME: CPT

## 2021-10-13 NOTE — PROGRESS NOTES
[facility] Poe Ms. Mariposa Stephens is a 78 y.o. y/o female with history of Afib, Aortic and Mitral Valve Porcine valve. She presents today for anticoagulation monitoring and adjustment. Pertinent PMH: The aortic and mitral valves are porcine and were replaced at the same time on March 9, 2010 by Dr. Abhishek Ennis. Patient Reported Findings:  Yes     No  [x]   []       Patient verifies current dosing regimen as listed  []   [x]       S/S bleeding/bruising/swelling/SOB -denies   []   [x]       Blood in urine or stool -denies   []   [x]       Procedures scheduled in the future at this time    []   [x]       Missed Dose   []   [x]       Extra Dose   [x]   []       Change in medications- ran out of glucosamine chondroitin but plans to resume---> denies     []   [x]       Change in health/diet/appetite. Her norm would be spinach, broccoli, shahzad or turnip greens twice a week and salads with iceberg other lower vitamin K --> Patient reports having shahzad greens 3 times weekly, this is an increase in vitamin K content from previous visits --> eating less greens, no NVD---> no changes, no NVD---> erratic, no NVD---> less greens, no NVD  []   [x]       Change in alcohol use ---> Denies  []   [x]       Change in activity  []   [x]       Hospital admission  []   [x]       Emergency department visit  []   [x]       Other complaints     Clinical Outcomes:  Yes     No  []   [x]       Major bleeding event  []   [x]       Thromboembolic event    Duration of warfarin Therapy: Indefinitely  INR Range:  2.0-3.0   Keep at 2.5-3.0 per the collaborative agreement from Dr. Austin Colvin    INR is 3.7 today dt no greens  Hold tonight then continue taking 2.5 mg on Mon only and 5 mg all other days. Encouraged to maintain a consistency of vegetables/salads.   Recheck INR in 6 weeks, 11/24    Referring is Dr. Gatito Goldman   INR (no units)   Date Value   10/13/2021 3.7   08/30/2021 2.6   07/19/2021 2.4   06/09/2021 2.7   04/28/2020 2.0 (H)   02/27/2019 1.11   09/17/2018 4   08/20/2018 2.8   For Pharmacy Admin Tracking Only     Intervention Detail: Dose Adjustment: 1, reason: Therapy Optimization   Total # of Interventions Recommended: 1   Total # of Interventions Accepted: 1   Time Spent (min): 04149 Nneka Isbell, [facility], PharmD

## 2021-11-29 ENCOUNTER — TELEPHONE (OUTPATIENT)
Dept: PHARMACY | Age: 76
End: 2021-11-29

## 2021-11-30 ENCOUNTER — ANTI-COAG VISIT (OUTPATIENT)
Dept: PHARMACY | Age: 76
End: 2021-11-30
Payer: MEDICARE

## 2021-11-30 DIAGNOSIS — I48.20 CHRONIC ATRIAL FIBRILLATION (HCC): ICD-10-CM

## 2021-11-30 DIAGNOSIS — Z95.2 S/P MITRAL VALVE REPLACEMENT: ICD-10-CM

## 2021-11-30 DIAGNOSIS — Z95.2 S/P AORTIC VALVE REPLACEMENT: Primary | ICD-10-CM

## 2021-11-30 LAB — INTERNATIONAL NORMALIZATION RATIO, POC: 2.6

## 2021-11-30 PROCEDURE — 85610 PROTHROMBIN TIME: CPT

## 2021-11-30 PROCEDURE — 99211 OFF/OP EST MAY X REQ PHY/QHP: CPT

## 2021-11-30 NOTE — PROGRESS NOTES
Rut Castaneda is a 78 y.o. y/o female with history of Afib, Aortic and Mitral Valve Porcine valve. She presents today for anticoagulation monitoring and adjustment. Pertinent PMH: The aortic and mitral valves are porcine and were replaced at the same time on March 9, 2010 by Dr. Liliya Garcia. Patient Reported Findings:  Yes     No  [x]   []       Patient verifies current dosing regimen as listed- confirms   []   [x]       S/S bleeding/bruising/swelling/SOB -denies   []   [x]       Blood in urine or stool -denies   []   [x]       Procedures scheduled in the future at this time  -denies    []   [x]       Missed Dose- denies    []   [x]       Extra Dose- denies    [x]   []       Change in medications- ran out of glucosamine chondroitin but plans to resume---> denies     []   [x]       Change in health/diet/appetite. Her norm would be spinach, broccoli, hsahzad or turnip greens twice a week and salads with iceberg other lower vitamin K --> Patient reports having shahzad greens 3 times weekly, this is an increase in vitamin K content from previous visits --> eating less greens, no NVD---> no changes, no NVD---> erratic, no NVD---> less greens, no NVD---> no changes, no NVD  []   [x]       Change in alcohol use ---> Denies  []   [x]       Change in activity  []   [x]       Hospital admission  []   [x]       Emergency department visit  []   [x]       Other complaints     Clinical Outcomes:  Yes     No  []   [x]       Major bleeding event  []   [x]       Thromboembolic event    Duration of warfarin Therapy: Indefinitely  INR Range:  2.0-3.0   Keep at 2.5-3.0 per the collaborative agreement from Dr. Yaya Henson    INR is 2.6 today   Continue taking 2.5 mg on Mon only and 5 mg all other days. Encouraged to maintain a consistency of vegetables/salads. RF called into [facility].    Recheck INR in 6 weeks, 1/11    **consent form signed 11/30/2021    Referring is Dr. Lo Mckee   INR (no units)   Date Value   11/30/2021 2.6   10/13/2021 3.7   08/30/2021 2.6   07/19/2021 2.4   04/28/2020 2.0 (H)   02/27/2019 1.11   09/17/2018 4   08/20/2018 2.8   For Pharmacy Admin Tracking Only     Intervention Detail: Refill(s) Provided   Total # of Interventions Recommended: 1   Total # of Interventions Accepted: 1   Time Spent (min): 25988 ZACK Pabon [facility], PharmD

## 2021-11-30 NOTE — TELEPHONE ENCOUNTER
Warfarin prescription phoned into [facility] [address] under Dr. Dajuan Pascal  Warfarin 5 mg tabs  Take 2.5 mg (5 mg x 0.5) every Mon; 5 mg (5 mg x 1) all other days  90 days   2 refills    Iam Fitzpatrick, PharmD, [facility]

## 2022-01-11 ENCOUNTER — ANTI-COAG VISIT (OUTPATIENT)
Dept: PHARMACY | Age: 77
End: 2022-01-11
Payer: MEDICARE

## 2022-01-11 DIAGNOSIS — Z95.2 S/P MITRAL VALVE REPLACEMENT: ICD-10-CM

## 2022-01-11 DIAGNOSIS — Z95.2 S/P AORTIC VALVE REPLACEMENT: Primary | ICD-10-CM

## 2022-01-11 DIAGNOSIS — I48.20 CHRONIC ATRIAL FIBRILLATION (HCC): ICD-10-CM

## 2022-01-11 LAB — INTERNATIONAL NORMALIZATION RATIO, POC: 2.6

## 2022-01-11 PROCEDURE — 85610 PROTHROMBIN TIME: CPT

## 2022-01-11 PROCEDURE — 99211 OFF/OP EST MAY X REQ PHY/QHP: CPT

## 2022-01-11 NOTE — PROGRESS NOTES
Thee Rubin Ms. Dolores Saunders is a 78 y.o. y/o female with history of Afib, Aortic and Mitral Valve Porcine valve. She presents today for anticoagulation monitoring and adjustment. Pertinent PMH: The aortic and mitral valves are porcine and were replaced at the same time on March 9, 2010 by Dr. Fiorella Caceres. Patient Reported Findings:  Yes     No  [x]   []       Patient verifies current dosing regimen as listed- confirms   []   [x]       S/S bleeding/bruising/swelling/SOB -denies   []   [x]       Blood in urine or stool -denies   []   [x]       Procedures scheduled in the future at this time  -denies    []   [x]       Missed Dose- denies    []   [x]       Extra Dose- denies    []   [x]       Change in medications- ran out of glucosamine chondroitin but plans to resume---> denies     []   [x]       Change in health/diet/appetite. Her norm would be spinach, broccoli, shahzad or turnip greens twice a week and salads with iceberg other lower vitamin K --> Patient reports having shahzad greens 3 times weekly, this is an increase in vitamin K content from previous visits --> eating less greens, no NVD---> no changes, no NVD---> erratic, no NVD---> less greens, no NVD---> no changes, no NVD  []   [x]       Change in alcohol use ---> Denies  []   [x]       Change in activity  []   [x]       Hospital admission  []   [x]       Emergency department visit  []   [x]       Other complaints     Clinical Outcomes:  Yes     No  []   [x]       Major bleeding event  []   [x]       Thromboembolic event    Duration of warfarin Therapy: Indefinitely  INR Range:  2.0-3.0   Keep at 2.5-3.0 per the collaborative agreement from Dr. Annette Hughes    INR is 2.6 today   Continue taking 2.5 mg on Mon only and 5 mg all other days. Encouraged to maintain a consistency of vegetables/salads.   Recheck INR in 6 weeks, 2/22    **consent form signed 11/30/2021    Referring is Dr. Cari Hernandez   INR (no units)   Date Value   11/30/2021 2.6   10/13/2021 3.7 08/30/2021 2.6   07/19/2021 2.4   04/28/2020 2.0 (H)   02/27/2019 1.11   09/17/2018 4   08/20/2018 2.8     For Pharmacy Admin Tracking Only     Total # of Interventions Recommended: 0   Total # of Interventions Accepted: 0   Time Spent (min): 5029 [facility], [facility], PharmD

## 2022-01-27 ENCOUNTER — OFFICE VISIT (OUTPATIENT)
Dept: CARDIOLOGY CLINIC | Age: 77
End: 2022-01-27
Payer: MEDICARE

## 2022-01-27 VITALS
BODY MASS INDEX: 37.38 KG/M2 | SYSTOLIC BLOOD PRESSURE: 118 MMHG | WEIGHT: 198 LBS | DIASTOLIC BLOOD PRESSURE: 60 MMHG | HEIGHT: 61 IN | OXYGEN SATURATION: 100 % | HEART RATE: 73 BPM

## 2022-01-27 DIAGNOSIS — E78.5 HYPERLIPIDEMIA, UNSPECIFIED HYPERLIPIDEMIA TYPE: Chronic | ICD-10-CM

## 2022-01-27 DIAGNOSIS — I48.20 CHRONIC ATRIAL FIBRILLATION (HCC): Primary | ICD-10-CM

## 2022-01-27 DIAGNOSIS — Z95.2 S/P MITRAL VALVE REPLACEMENT: Chronic | ICD-10-CM

## 2022-01-27 DIAGNOSIS — Z95.2 S/P AORTIC VALVE REPLACEMENT: Chronic | ICD-10-CM

## 2022-01-27 PROCEDURE — G8417 CALC BMI ABV UP PARAM F/U: HCPCS | Performed by: INTERNAL MEDICINE

## 2022-01-27 PROCEDURE — 99214 OFFICE O/P EST MOD 30 MIN: CPT | Performed by: INTERNAL MEDICINE

## 2022-01-27 PROCEDURE — G8427 DOCREV CUR MEDS BY ELIG CLIN: HCPCS | Performed by: INTERNAL MEDICINE

## 2022-01-27 PROCEDURE — 93000 ELECTROCARDIOGRAM COMPLETE: CPT | Performed by: INTERNAL MEDICINE

## 2022-01-27 PROCEDURE — 3017F COLORECTAL CA SCREEN DOC REV: CPT | Performed by: INTERNAL MEDICINE

## 2022-01-27 PROCEDURE — 1090F PRES/ABSN URINE INCON ASSESS: CPT | Performed by: INTERNAL MEDICINE

## 2022-01-27 PROCEDURE — 1123F ACP DISCUSS/DSCN MKR DOCD: CPT | Performed by: INTERNAL MEDICINE

## 2022-01-27 PROCEDURE — 1036F TOBACCO NON-USER: CPT | Performed by: INTERNAL MEDICINE

## 2022-01-27 PROCEDURE — 4040F PNEUMOC VAC/ADMIN/RCVD: CPT | Performed by: INTERNAL MEDICINE

## 2022-01-27 PROCEDURE — G8484 FLU IMMUNIZE NO ADMIN: HCPCS | Performed by: INTERNAL MEDICINE

## 2022-01-27 PROCEDURE — G8400 PT W/DXA NO RESULTS DOC: HCPCS | Performed by: INTERNAL MEDICINE

## 2022-01-27 NOTE — PROGRESS NOTES
[address]   Cardiac Evaluation      Patient: Daquan Souza  YOB: 1945  Date: 1/27/22       Chief Complaint   Patient presents with    Atrial Fibrillation    Hyperlipidemia        Referring provider: Shira Ramos MD    History of Present Illness:   Ms Lambert Bee is seen today in follow up. History includes porcine aortic/mitral valve replacements in 2010. She has hyperlipidemia and atrial flutter. Iam Daily had rheumatic fever at the age of 17. She is retired from [employer]. Today, Ms Lambert Bee states she has been fine. She denies any chest pain, palpitations, BUENROSTRO, dizziness, or edema. Iam Daily states she exercises 1-2 times per week at a gym. She uses weight machines and walks on a treadmill. Past Medical History:   has a past medical history of Aortic valve disease, rheumatic, Atrial flutter ([address] Hypercholesteremia, and Mitral Valve Disease. Surgical History:   has a past surgical history that includes Breast biopsy; Dilation and curettage of uterus; Cardioversion (2/4/13); Colonoscopy; Aortic valve replacement (03/2010); Mitral valve replacement (03/2010); and Breast biopsy (Right, 2/27/2019).      Current Outpatient Medications   Medication Sig Dispense Refill    latanoprost (XALATAN) 0.005 % ophthalmic solution INSTILL 1 DROP INTO BOTH EYES EVERY EVENING AS DIRECTED      carvedilol (COREG) 6.25 MG tablet TAKE 1 TABLET TWICE DAILY WITH FOOD 180 tablet 3    simvastatin (ZOCOR) 40 MG tablet Take 1 tablet by mouth nightly 90 tablet 3    Cholecalciferol (VITAMIN D3) 2000 units CAPS Take by mouth      Probiotic Product (PROBIOTIC DAILY PO) Take by mouth daily      warfarin (COUMADIN) 5 MG tablet One tab po nightly and on Friday 1 1/2 tab 30 tablet 3    Multiple Vitamins-Minerals (CENTRUM SILVER) TABS Take 1 tablet by mouth as needed       acetaminophen (TYLENOL) 500 MG tablet Take 500 mg by mouth every 6 hours as needed for Pain       No current facility-administered medications for this visit. Social History:  Social History     Socioeconomic History    Marital status:      Spouse name: Not on file    Number of children: Not on file    Years of education: Not on file    Highest education level: Not on file   Occupational History    Not on file   Tobacco Use    Smoking status: Never Smoker    Smokeless tobacco: Never Used   Vaping Use    Vaping Use: Never used   Substance and Sexual Activity    Alcohol use: Yes     Alcohol/week: 0.0 standard drinks     Comment: rare    Drug use: No    Sexual activity: Not on file   Other Topics Concern    Not on file   Social History Narrative    Retired   , non smoker , non drinker , 2 children  Now a       Social Determinants of Health     Financial Resource Strain:     Difficulty of Paying Living Expenses: Not on file   Food Insecurity:     Worried About [address] in the Last Year: Not on file    Ranjeet of Food in the Last Year: Not on file   Transportation Needs:     Lack of Transportation (Medical): Not on file    Lack of Transportation (Non-Medical):  Not on file   Physical Activity:     Days of Exercise per Week: Not on file    Minutes of Exercise per Session: Not on file   Stress:     Feeling of Stress : Not on file   Social Connections:     Frequency of Communication with Friends and Family: Not on file    Frequency of Social Gatherings with Friends and Family: Not on file    Attends Bahai Services: Not on file    Active Member of Clubs or Organizations: Not on file    Attends Club or Organization Meetings: Not on file    Marital Status: Not on file   Intimate Partner Violence:     Fear of Current or Ex-Partner: Not on file    Emotionally Abused: Not on file    Physically Abused: Not on file    Sexually Abused: Not on file   Housing Stability:     Unable to Pay for Housing in the Last Year: Not on file    Number of [place] in the Last Year: Not on file    Unstable Housing in the Last Year: Not on file       Family History:  family history includes Cancer in her maternal aunt; Diabetes in her mother; Heart Disease in her mother. Allergies:  Patient has no known allergies. Review of Systems:   · Constitutional: there has been no unanticipated weight loss. No change in energy or activity level   · Eyes: No visual changes   · ENT: No Headaches, hearing loss or vertigo. No mouth sores or sore throat. · Cardiovascular: Reviewed in HPI  · Respiratory: No cough or wheezing, no sputum production. · Gastrointestinal: No abdominal pain, appetite loss, blood in stools. No change in bowel or bladder habits. · Genitourinary: No nocturia, dysuria, trouble voiding  · Musculoskeletal:  No gait disturbance, weakness or joint complaints. · Integumentary: No rash or pruritis. · Neurological: No headache, change in muscle strength, numbness or tingling. No change in gait, balance, coordination, mood, affect, memory, mentation, behavior. · Psychiatric: No anxiety or depression  · Endocrine: No malaise or fever  · Hematologic/Lymphatic: No abnormal bruising or bleeding, blood clots or swollen lymph nodes. · Allergic/Immunologic: No nasal congestion or hives. Physical Examination:    Vitals:    01/27/22 1433   BP: 118/60   Site: Left Upper Arm   Position: Sitting   Cuff Size: Large Adult   Pulse: 73   SpO2: 100%   Weight: 198 lb (89.8 kg)   Height: 5' 1\" (1.549 m)     Body mass index is 37.41 kg/m².      Wt Readings from Last 3 Encounters:   01/27/22 198 lb (89.8 kg)   09/08/21 192 lb (87.1 kg)   07/28/21 192 lb (87.1 kg)      BP Readings from Last 3 Encounters:   01/27/22 118/60   07/28/21 124/80   01/29/21 130/80      Constitutional and General Appearance:  appears younger than stated age, always happy and very pleasant  Eyes - no xanthelasma  Respiratory:  · Normal excursion and expansion without use of accessory muscles  · Resp Auscultation: Normal breath sounds without dullness  Cardiovascular:  · The apical impulses not displaced  · Heart is regular rate and rhythm with normal S1, S2 (tissue valves), mid peaking 2/6 SM  · PMI is normal  · The carotid upstroke is normal, no bruit noted   · JVP is not elevated  · Peripheral pulses are symmetrical  · There is no clubbing, cyanosis of the extremities  · No edema  · No varicosities  · Femoral Arteries: 2+ and equal without bruits  · Pedal Pulses: 2+ and equal   Abdomen:  · No masses or tenderness  · Aorta not palpable  · Normal bowel sounds  Neurological/Psychiatric:  · Alert and oriented x3  · Moves all extremities well  · Exhibits normal gait balance and coordination      Assessment/Plan  1. atrial fibrillation/flutter (HCC)she has flutter chronically. coumadin followed by INR clinic at [facility]  5/16 Holter-- showed atrial flutter with AHR 93, few PVC's  9/17/12 Holter--Afib (100%), HR  ( avg 101) with PVCs. EKG>atrial fibrillation/flutter   2. S/P aortic valve replacement   Echo 8/28/20: EF 60%. Grade II DD, A bioprosthetic mitral valve is well seated with peak velocity of 2.3m/s and a mean gradient of 7mmHg. Mild mitral regurgitation. A bioprosthetic artificial aortic valve appears well seated with a maximum velocity of 2.5m/s and a mean gradient of 16mmHg. Moderate tricuspid regurgitation. Estimated pulmonary artery systolic pressure is mildly to moderately elevated at 42 mmHg assuming a right atrial pressure of 3 mmHg. The left atrium is dilated. Echo 4/17/14: EF 55%. reversal of E/A inflow velocities across the mitral valve suggesting impaired left ventricular relaxation. v A prosthetic mitral valve is well seated with maximum gradient of 15 mmHg and a mean gradient of 6 mmHg. Mild mitral regurgitation is present. The left atrium is dilated. A prosthetic aortic valve appears well seated with a maximum gradient of 24mmHg and a mean gradient of 15 mmHg. No evidence of aortic valve regurgitation.   mild to moderate tricuspid regurgitation with RVSP estimated at 41mmHg. Mild pulmonic regurgitation present   3. S/P mitral valve replacement -stable, see above    4. Hyperlipidemia, unspecified hyperlipidemia type -well controlled on labs 5/12/21: ; TRIG 82; HDL 46; LDL 65       PLAN:  Ms Gamal Jama appears stable. No med changes. Encouraged to exercise more than 1-2 days of the week. FU 6 months. Thank you for allowing to me to participate in the care of Dayana Carroll. Scribe's attestation: This note was scribed in the presence of Dr Bethel Leggett MD by Abe Duke, JILLIAN. The scribe's documentation has been prepared under my direction and personally reviewed by me in its entirety. I confirm that the note above accurately reflects all work, treatment, procedures, and medical decision making performed by me.

## 2022-02-22 ENCOUNTER — ANTI-COAG VISIT (OUTPATIENT)
Dept: PHARMACY | Age: 77
End: 2022-02-22
Payer: MEDICARE

## 2022-02-22 DIAGNOSIS — Z95.2 S/P AORTIC VALVE REPLACEMENT: Primary | ICD-10-CM

## 2022-02-22 DIAGNOSIS — I48.20 CHRONIC ATRIAL FIBRILLATION (HCC): ICD-10-CM

## 2022-02-22 DIAGNOSIS — Z95.2 S/P MITRAL VALVE REPLACEMENT: ICD-10-CM

## 2022-02-22 LAB — INTERNATIONAL NORMALIZATION RATIO, POC: 2.3

## 2022-02-22 PROCEDURE — 99211 OFF/OP EST MAY X REQ PHY/QHP: CPT

## 2022-02-22 PROCEDURE — 85610 PROTHROMBIN TIME: CPT

## 2022-02-22 NOTE — PROGRESS NOTES
Carolin Gudino Ms. Alec Mathew is a 78 y.o. y/o female with history of Afib, Aortic and Mitral Valve Porcine valve. She presents today for anticoagulation monitoring and adjustment. Pertinent PMH: The aortic and mitral valves are porcine and were replaced at the same time on March 9, 2010 by Dr. Tatyana Moody. Patient Reported Findings:  Yes     No  [x]   []       Patient verifies current dosing regimen as listed- confirms   []   [x]       S/S bleeding/bruising/swelling/SOB -denies   []   [x]       Blood in urine or stool -denies   []   [x]       Procedures scheduled in the future at this time  -denies    []   [x]       Missed Dose- denies    []   [x]       Extra Dose- denies    []   [x]       Change in medications- ran out of glucosamine chondroitin but plans to resume---> denies     []   [x]       Change in health/diet/appetite. Her norm would be spinach, broccoli, shahzad or turnip greens twice a week and salads with iceberg other lower vitamin K --> Patient reports having shahzad greens 3 times weekly, this is an increase in vitamin K content from previous visits --> erratic, no NVD---> less greens, no NVD---> no changes, no NVD  []   [x]       Change in alcohol use ---> Denies  []   [x]       Change in activity  []   [x]       Hospital admission  []   [x]       Emergency department visit  []   [x]       Other complaints     Clinical Outcomes:  Yes     No  []   [x]       Major bleeding event  []   [x]       Thromboembolic event    Duration of warfarin Therapy: Indefinitely  INR Range:  2.0-3.0   Keep at 2.5-3.0 per the collaborative agreement from Dr. Moran Forward    INR is 2.3 today   Continue taking 2.5 mg on Mon only and 5 mg all other days. Encouraged to maintain a consistency of vegetables/salads.   Refilled warfarin at op pharmacy   Recheck INR in 6 weeks, 4/5    **consent form signed 11/30/2021    Referring is Dr. Robin Pardo   INR (no units)   Date Value   01/11/2022 2.6   11/30/2021 2.6   10/13/2021 3.7

## 2022-04-06 ENCOUNTER — ANTI-COAG VISIT (OUTPATIENT)
Dept: PHARMACY | Age: 77
End: 2022-04-06
Payer: MEDICARE

## 2022-04-06 ENCOUNTER — HOSPITAL ENCOUNTER (OUTPATIENT)
Dept: WOMENS IMAGING | Age: 77
Discharge: HOME | End: 2022-04-06
Payer: MEDICARE

## 2022-04-06 ENCOUNTER — HOSPITAL ENCOUNTER (OUTPATIENT)
Age: 77
Discharge: HOME | End: 2022-04-06
Payer: MEDICARE

## 2022-04-06 VITALS — WEIGHT: 197 LBS | HEIGHT: 61 IN | BODY MASS INDEX: 37.19 KG/M2

## 2022-04-06 DIAGNOSIS — I48.20 CHRONIC ATRIAL FIBRILLATION (HCC): ICD-10-CM

## 2022-04-06 DIAGNOSIS — Z12.31 VISIT FOR SCREENING MAMMOGRAM: ICD-10-CM

## 2022-04-06 DIAGNOSIS — Z95.2 S/P AORTIC VALVE REPLACEMENT: Primary | ICD-10-CM

## 2022-04-06 DIAGNOSIS — Z95.2 S/P MITRAL VALVE REPLACEMENT: ICD-10-CM

## 2022-04-06 LAB
A/G RATIO: 1.7 (ref 1.1–2.2)
ALBUMIN SERPL-MCNC: 4.4 G/DL (ref 3.4–5)
ALP BLD-CCNC: 104 U/L (ref 40–129)
ALT SERPL-CCNC: 16 U/L (ref 10–40)
ANION GAP SERPL CALCULATED.3IONS-SCNC: 16 MMOL/L (ref 3–16)
AST SERPL-CCNC: 19 U/L (ref 15–37)
BILIRUB SERPL-MCNC: 0.6 MG/DL (ref 0–1)
BILIRUBIN URINE: NEGATIVE
BLOOD, URINE: ABNORMAL
BUN BLDV-MCNC: 12 MG/DL (ref 7–20)
CALCIUM SERPL-MCNC: 9.3 MG/DL (ref 8.3–10.6)
CHLORIDE BLD-SCNC: 108 MMOL/L (ref 99–110)
CHOLESTEROL, TOTAL: 134 MG/DL (ref 0–199)
CLARITY: CLEAR
CO2: 22 MMOL/L (ref 21–32)
COLOR: YELLOW
CREAT SERPL-MCNC: 1 MG/DL (ref 0.6–1.2)
EPITHELIAL CELLS, UA: 3 /HPF (ref 0–5)
GFR AFRICAN AMERICAN: >60
GFR NON-AFRICAN AMERICAN: 54
GLUCOSE BLD-MCNC: 110 MG/DL (ref 70–99)
GLUCOSE URINE: NEGATIVE MG/DL
HCT VFR BLD CALC: 37.4 % (ref 36–48)
HDLC SERPL-MCNC: 52 MG/DL (ref 40–60)
HEMOGLOBIN: 12.2 G/DL (ref 12–16)
HYALINE CASTS: 1 /LPF (ref 0–8)
INTERNATIONAL NORMALIZATION RATIO, POC: 1.8
KETONES, URINE: NEGATIVE MG/DL
LDL CHOLESTEROL CALCULATED: 66 MG/DL
LEUKOCYTE ESTERASE, URINE: ABNORMAL
MCH RBC QN AUTO: 28.4 PG (ref 26–34)
MCHC RBC AUTO-ENTMCNC: 32.5 G/DL (ref 31–36)
MCV RBC AUTO: 87.3 FL (ref 80–100)
MICROSCOPIC EXAMINATION: YES
NITRITE, URINE: NEGATIVE
PDW BLD-RTO: 14.9 % (ref 12.4–15.4)
PH UA: 6 (ref 5–8)
PLATELET # BLD: 160 K/UL (ref 135–450)
PMV BLD AUTO: 9.8 FL (ref 5–10.5)
POTASSIUM SERPL-SCNC: 4.2 MMOL/L (ref 3.5–5.1)
PROTEIN UA: NEGATIVE MG/DL
RBC # BLD: 4.28 M/UL (ref 4–5.2)
RBC UA: 6 /HPF (ref 0–4)
SODIUM BLD-SCNC: 146 MMOL/L (ref 136–145)
SPECIFIC GRAVITY UA: 1.02 (ref 1–1.03)
TOTAL PROTEIN: 7 G/DL (ref 6.4–8.2)
TRIGL SERPL-MCNC: 80 MG/DL (ref 0–150)
URINE TYPE: ABNORMAL
UROBILINOGEN, URINE: 0.2 E.U./DL
VITAMIN D 25-HYDROXY: 38.6 NG/ML
VLDLC SERPL CALC-MCNC: 16 MG/DL
WBC # BLD: 5.6 K/UL (ref 4–11)
WBC UA: 4 /HPF (ref 0–5)

## 2022-04-06 PROCEDURE — 81001 URINALYSIS AUTO W/SCOPE: CPT

## 2022-04-06 PROCEDURE — 80061 LIPID PANEL: CPT

## 2022-04-06 PROCEDURE — 80053 COMPREHEN METABOLIC PANEL: CPT

## 2022-04-06 PROCEDURE — 82306 VITAMIN D 25 HYDROXY: CPT

## 2022-04-06 PROCEDURE — 77063 BREAST TOMOSYNTHESIS BI: CPT

## 2022-04-06 PROCEDURE — 85610 PROTHROMBIN TIME: CPT

## 2022-04-06 PROCEDURE — 85027 COMPLETE CBC AUTOMATED: CPT

## 2022-04-06 PROCEDURE — 99211 OFF/OP EST MAY X REQ PHY/QHP: CPT

## 2022-04-06 NOTE — PROGRESS NOTES
Jocelynn Dutton is a 78 y.o. y/o female with history of Afib, Aortic and Mitral Valve Porcine valve. She presents today for anticoagulation monitoring and adjustment. Pertinent PMH: The aortic and mitral valves are porcine and were replaced at the same time on March 9, 2010 by Dr. Tomer Koch. Patient Reported Findings:  Yes     No  [x]   []       Patient verifies current dosing regimen as listed- confirms   []   [x]       S/S bleeding/bruising/swelling/SOB -denies   []   [x]       Blood in urine or stool -denies   []   [x]       Procedures scheduled in the future at this time  -denies    []   [x]       Missed Dose- denies    []   [x]       Extra Dose- denies    [x]   []       Change in medications- ran out of glucosamine chondroitin but plans to resume---> started MVI 1 month ago, not sure brand     [x]   []       Change in health/diet/appetite. Her norm would be spinach, broccoli, shahzad or turnip greens twice a week and salads with iceberg other lower vitamin K --> Patient reports having shahzad greens 3 times weekly, this is an increase in vitamin K content from previous visits --> erratic, no NVD---> less greens, no NVD---> no changes, no NVD --> had large serving of greens recently   []   [x]       Change in alcohol use ---> Denies  []   [x]       Change in activity  []   [x]       Hospital admission  []   [x]       Emergency department visit  []   [x]       Other complaints     Clinical Outcomes:  Yes     No  []   [x]       Major bleeding event  []   [x]       Thromboembolic event    Duration of warfarin Therapy: Indefinitely  INR Range:  2.0-3.0   Keep at 2.5-3.0 per the collaborative agreement from Dr. Leonora Anderson    INR is 1.8 today possibly from MVI or acutely more vit k. Will need to assess at next appt   Take 7.5 mg tonight then continue taking 2.5 mg on Mon only and 5 mg all other days.    Encouraged to maintain a consistency of vegetables/salads  Recheck INR in 4 weeks, 5/4    **consent form signed 11/30/2021    Referring is Dr. Saurav Rosales   INR (no units)   Date Value   02/22/2022 2.3   01/11/2022 2.6   11/30/2021 2.6   10/13/2021 3.7   04/28/2020 2.0 (H)   02/27/2019 1.11   09/17/2018 4   08/20/2018 2.8       For Pharmacy Admin Tracking Only     Intervention Detail: Dose Adjustment: 1, reason: Therapy Optimization   Total # of Interventions Recommended: 1   Total # of Interventions Accepted: 1   Time Spent (min): Hema Dukes [id number], [facility], PharmD

## 2022-05-04 ENCOUNTER — ANTI-COAG VISIT (OUTPATIENT)
Dept: PHARMACY | Age: 77
End: 2022-05-04
Payer: MEDICARE

## 2022-05-04 DIAGNOSIS — Z95.2 S/P MITRAL VALVE REPLACEMENT: ICD-10-CM

## 2022-05-04 DIAGNOSIS — I48.20 CHRONIC ATRIAL FIBRILLATION (HCC): ICD-10-CM

## 2022-05-04 DIAGNOSIS — Z95.2 S/P AORTIC VALVE REPLACEMENT: Primary | ICD-10-CM

## 2022-05-04 LAB — INTERNATIONAL NORMALIZATION RATIO, POC: 2.5

## 2022-05-04 PROCEDURE — 99211 OFF/OP EST MAY X REQ PHY/QHP: CPT

## 2022-05-04 PROCEDURE — 85610 PROTHROMBIN TIME: CPT

## 2022-05-04 NOTE — PROGRESS NOTES
Antonia Gibbs Ms. Zaki Richard is a 78 y.o. y/o female with history of Afib, Aortic and Mitral Valve Porcine valve. She presents today for anticoagulation monitoring and adjustment. Pertinent PMH: The aortic and mitral valves are porcine and were replaced at the same time on March 9, 2010 by Dr. Mindi Iglesias. Patient Reported Findings:  Yes     No  [x]   []       Patient verifies current dosing regimen as listed- confirms   []   [x]       S/S bleeding/bruising/swelling/SOB -denies   []   [x]       Blood in urine or stool -denies   []   [x]       Procedures scheduled in the future at this time  -denies    []   [x]       Missed Dose- denies    []   [x]       Extra Dose- denies    [x]   []       Change in medications- ran out of glucosamine chondroitin but plans to resume---> started MVI 1 month ago, not sure brand---> Stopped MVI and started probiotic     [x]   []       Change in health/diet/appetite. Her norm would be spinach, broccoli, shahzad or turnip greens twice a week and salads with iceberg other lower vitamin K --> Patient reports having shahzad greens 3 times weekly, this is an increase in vitamin K content from previous visits --> erratic, no NVD---> less greens, no NVD---> no changes, no NVD --> had large serving of greens recently---> was not eating many greens but then had a lot yesterday    []   [x]       Change in alcohol use ---> Denies  []   [x]       Change in activity  []   [x]       Hospital admission  []   [x]       Emergency department visit  []   [x]       Other complaints     Clinical Outcomes:  Yes     No  []   [x]       Major bleeding event  []   [x]       Thromboembolic event    Duration of warfarin Therapy: Indefinitely  INR Range:  2.0-3.0   Keep at 2.5-3.0 per the collaborative agreement from Dr. Cristy Bartlett    INR is 2.5 today   Continue taking 2.5 mg on Mon only and 5 mg all other days.    Encouraged to maintain a consistency of vegetables/salads  Recheck INR in 4 weeks, 6/1    **consent form signed 11/30/2021    Referring is Dr. Purvi Jefferson   INR (no units)   Date Value   05/04/2022 2.5   04/06/2022 1.8   02/22/2022 2.3   01/11/2022 2.6   04/28/2020 2.0 (H)   02/27/2019 1.11   09/17/2018 4   08/20/2018 2.8       For Pharmacy Admin Tracking Only     Total # of Interventions Recommended: 0   Total # of Interventions Accepted: 0   Time Spent (min): [address], [facility] [place]

## 2022-06-01 ENCOUNTER — ANTI-COAG VISIT (OUTPATIENT)
Dept: PHARMACY | Age: 77
End: 2022-06-01
Payer: MEDICARE

## 2022-06-01 DIAGNOSIS — Z95.2 S/P MITRAL VALVE REPLACEMENT: ICD-10-CM

## 2022-06-01 DIAGNOSIS — Z95.2 S/P AORTIC VALVE REPLACEMENT: Primary | ICD-10-CM

## 2022-06-01 DIAGNOSIS — I48.20 CHRONIC ATRIAL FIBRILLATION (HCC): ICD-10-CM

## 2022-06-01 LAB — INTERNATIONAL NORMALIZATION RATIO, POC: 2.5

## 2022-06-01 PROCEDURE — 99211 OFF/OP EST MAY X REQ PHY/QHP: CPT

## 2022-06-01 PROCEDURE — 85610 PROTHROMBIN TIME: CPT

## 2022-06-01 NOTE — PROGRESS NOTES
Zion Worthy Ms. Dalia Stephenson is a 78 y.o. y/o female with history of Afib, Aortic and Mitral Valve Porcine valve. She presents today for anticoagulation monitoring and adjustment. Pertinent PMH: The aortic and mitral valves are porcine and were replaced at the same time on March 9, 2010 by Dr. William Harrington. Patient Reported Findings:  Yes     No  [x]   []       Patient verifies current dosing regimen as listed- confirms   []   [x]       S/S bleeding/bruising/swelling/SOB -denies   []   [x]       Blood in urine or stool -denies   []   [x]       Procedures scheduled in the future at this time  -denies    []   [x]       Missed Dose- denies    []   [x]       Extra Dose- denies    [x]   []       Change in medications- ran out of glucosamine chondroitin but plans to resume---> started MVI 1 month ago, not sure brand---> Stopped MVI and started probiotic --> resumed vit d3 and MVI    [x]   []       Change in health/diet/appetite. Her norm would be spinach, broccoli, shahzad or turnip greens twice a week and salads with iceberg other lower vitamin K --> Patient reports having shahzad greens 3 times weekly, this is an increase in vitamin K content from previous visits --> erratic, no NVD---> was not eating many greens but then had a lot yesterday --> no changes    []   [x]       Change in alcohol use ---> Denies  []   [x]       Change in activity  []   [x]       Hospital admission  []   [x]       Emergency department visit  []   [x]       Other complaints     Clinical Outcomes:  Yes     No  []   [x]       Major bleeding event  []   [x]       Thromboembolic event    Duration of warfarin Therapy: Indefinitely  INR Range:  2.0-3.0   Keep at 2.5-3.0 per the collaborative agreement from Dr. Rohit Camarillo    INR is 2.5 today   Continue taking 2.5 mg on Mon only and 5 mg all other days.    Encouraged to maintain a consistency of vegetables/salads  Refilled warfarin at op pharmacy   Recheck INR in 4 weeks, 6/29    **consent form signed 11/30/2021    Referring is Dr. Marco Potter   INR (no units)   Date Value   05/04/2022 2.5   04/06/2022 1.8   02/22/2022 2.3   01/11/2022 2.6   04/28/2020 2.0 (H)   02/27/2019 1.11   09/17/2018 4   08/20/2018 2.8       For Pharmacy Admin Tracking Only     Intervention Detail: Refill(s) Provided   Total # of Interventions Recommended: 1   Total # of Interventions Accepted: 1   Time Spent (min): [address] Andersen [facility], PharmD

## 2022-06-29 ENCOUNTER — ANTI-COAG VISIT (OUTPATIENT)
Dept: PHARMACY | Age: 77
End: 2022-06-29
Payer: MEDICARE

## 2022-06-29 DIAGNOSIS — Z95.2 S/P AORTIC VALVE REPLACEMENT: Primary | ICD-10-CM

## 2022-06-29 DIAGNOSIS — I48.20 CHRONIC ATRIAL FIBRILLATION (HCC): ICD-10-CM

## 2022-06-29 DIAGNOSIS — Z95.2 S/P MITRAL VALVE REPLACEMENT: ICD-10-CM

## 2022-06-29 LAB — INTERNATIONAL NORMALIZATION RATIO, POC: 2.4

## 2022-06-29 PROCEDURE — 85610 PROTHROMBIN TIME: CPT

## 2022-06-29 PROCEDURE — 99211 OFF/OP EST MAY X REQ PHY/QHP: CPT

## 2022-06-29 NOTE — PROGRESS NOTES
Lourdes Mendenhall is a 78 y.o. y/o female with history of Afib, Aortic and Mitral Valve Porcine valve. She presents today for anticoagulation monitoring and adjustment. Pertinent PMH: The aortic and mitral valves are porcine and were replaced at the same time on March 9, 2010 by Dr. Jose Parry. Patient Reported Findings:  Yes     No  [x]   []       Patient verifies current dosing regimen as listed- confirms   []   [x]       S/S bleeding/bruising/swelling/SOB -denies   []   [x]       Blood in urine or stool -denies   []   [x]       Procedures scheduled in the future at this time  -denies    []   [x]       Missed Dose- denies    []   [x]       Extra Dose- denies    []   [x]       Change in medications- ran out of glucosamine chondroitin but plans to resume---> started MVI 1 month ago, not sure brand---> Stopped MVI and started probiotic --> resumed vit d3 and MVI --> no changes    []   [x]       Change in health/diet/appetite. Her norm would be spinach, broccoli, shahzad or turnip greens twice a week and salads with iceberg other lower vitamin K --> Patient reports having shahzad greens 3 times weekly, this is an increase in vitamin K content from previous visits --> erratic, no NVD---> was not eating many greens but then had a lot yesterday --> no changes    []   [x]       Change in alcohol use ---> Denies  []   [x]       Change in activity  []   [x]       Hospital admission  []   [x]       Emergency department visit  []   [x]       Other complaints     Clinical Outcomes:  Yes     No  []   [x]       Major bleeding event  []   [x]       Thromboembolic event    Duration of warfarin Therapy: Indefinitely  INR Range:  2.0-3.0   Keep at 2.5-3.0 per the collaborative agreement from Dr. Jimmy Lynne    INR is 2.4 today   Continue taking 2.5 mg on Mon only and 5 mg all other days.    Encouraged to maintain a consistency of vegetables/salads  Recheck INR in 4 weeks, 7/27    **consent form signed 11/30/2021    Referring is Dr. Nikko Russell   INR (no units)   Date Value   04/28/2020 2.0 (H)   02/27/2019 1.11   09/17/2018 4   08/20/2018 2.8     INR,(POC) (no units)   Date Value   06/01/2022 2.5   05/04/2022 2.5   04/06/2022 1.8   02/22/2022 2.3       For Pharmacy Admin Tracking Only     Intervention Detail: Refill(s) Provided   Total # of Interventions Recommended: 1   Total # of Interventions Accepted: 1   Time Spent (min): [address] Andersen [facility], PharmD

## 2022-07-12 ENCOUNTER — OFFICE VISIT (OUTPATIENT)
Dept: CARDIOLOGY CLINIC | Age: 77
End: 2022-07-12
Payer: MEDICARE

## 2022-07-12 VITALS
SYSTOLIC BLOOD PRESSURE: 118 MMHG | OXYGEN SATURATION: 100 % | BODY MASS INDEX: 35.5 KG/M2 | DIASTOLIC BLOOD PRESSURE: 80 MMHG | HEIGHT: 61 IN | WEIGHT: 188 LBS | HEART RATE: 95 BPM

## 2022-07-12 DIAGNOSIS — Z95.2 S/P MITRAL VALVE REPLACEMENT: Chronic | ICD-10-CM

## 2022-07-12 DIAGNOSIS — I35.1 AORTIC VALVE INSUFFICIENCY, ETIOLOGY OF CARDIAC VALVE DISEASE UNSPECIFIED: Chronic | ICD-10-CM

## 2022-07-12 DIAGNOSIS — E78.5 HYPERLIPIDEMIA, UNSPECIFIED HYPERLIPIDEMIA TYPE: Chronic | ICD-10-CM

## 2022-07-12 DIAGNOSIS — Z95.2 S/P AORTIC VALVE REPLACEMENT: Primary | Chronic | ICD-10-CM

## 2022-07-12 DIAGNOSIS — I48.20 CHRONIC ATRIAL FIBRILLATION (HCC): ICD-10-CM

## 2022-07-12 PROCEDURE — 1090F PRES/ABSN URINE INCON ASSESS: CPT | Performed by: INTERNAL MEDICINE

## 2022-07-12 PROCEDURE — 3017F COLORECTAL CA SCREEN DOC REV: CPT | Performed by: INTERNAL MEDICINE

## 2022-07-12 PROCEDURE — G8400 PT W/DXA NO RESULTS DOC: HCPCS | Performed by: INTERNAL MEDICINE

## 2022-07-12 PROCEDURE — G8427 DOCREV CUR MEDS BY ELIG CLIN: HCPCS | Performed by: INTERNAL MEDICINE

## 2022-07-12 PROCEDURE — G8417 CALC BMI ABV UP PARAM F/U: HCPCS | Performed by: INTERNAL MEDICINE

## 2022-07-12 PROCEDURE — 1036F TOBACCO NON-USER: CPT | Performed by: INTERNAL MEDICINE

## 2022-07-12 PROCEDURE — 1123F ACP DISCUSS/DSCN MKR DOCD: CPT | Performed by: INTERNAL MEDICINE

## 2022-07-12 PROCEDURE — 99214 OFFICE O/P EST MOD 30 MIN: CPT | Performed by: INTERNAL MEDICINE

## 2022-07-12 NOTE — PROGRESS NOTES
[address]   Cardiac Evaluation      Patient: Emma Barajas  YOB: 1945  Date: 7/12/22       Chief Complaint   Patient presents with    Atrial Fibrillation    Hyperlipidemia        Referring provider: Rangel Martino MD    History of Present Illness:   Ms Trupti Andres is seen today in follow up. History includes porcine aortic/mitral valve replacements in 2010. She has hyperlipidemia and atrial flutter. Jason Bales had rheumatic fever at the age of 17. She is retired from [employer]. Today, Ms Trupti Andres states she had diarrhea for 3 days after eating ox tail and cabbage. This has resolved now. Elex Standard denies chest pain, palpitations, BUENROSTRO, dizziness, or edema. She is physically active with yard work and exercise when not doing yard work. Past Medical History:   has a past medical history of Aortic valve disease, rheumatic, Atrial flutter ([address] Hypercholesteremia, and Mitral Valve Disease. Surgical History:   has a past surgical history that includes Breast biopsy; Dilation and curettage of uterus; Cardioversion (2/4/13); Colonoscopy; Aortic valve replacement (03/2010); Mitral valve replacement (03/2010); and Breast biopsy (Right, 2/27/2019).      Current Outpatient Medications   Medication Sig Dispense Refill    latanoprost (XALATAN) 0.005 % ophthalmic solution INSTILL 1 DROP INTO BOTH EYES EVERY EVENING AS DIRECTED      carvedilol (COREG) 6.25 MG tablet TAKE 1 TABLET TWICE DAILY WITH FOOD 180 tablet 3    simvastatin (ZOCOR) 40 MG tablet Take 1 tablet by mouth nightly 90 tablet 3    Cholecalciferol (VITAMIN D3) 2000 units CAPS Take by mouth      Probiotic Product (PROBIOTIC DAILY PO) Take by mouth daily      warfarin (COUMADIN) 5 MG tablet One tab po nightly and on Friday 1 1/2 tab 30 tablet 3    Multiple Vitamins-Minerals (CENTRUM SILVER) TABS Take 1 tablet by mouth as needed       acetaminophen (TYLENOL) 500 MG tablet Take 500 mg by mouth every 6 hours as needed for Pain No current facility-administered medications for this visit. Social History:  Social History     Socioeconomic History    Marital status:      Spouse name: Not on file    Number of children: Not on file    Years of education: Not on file    Highest education level: Not on file   Occupational History    Not on file   Tobacco Use    Smoking status: Never Smoker    Smokeless tobacco: Never Used   Vaping Use    Vaping Use: Never used   Substance and Sexual Activity    Alcohol use: Yes     Alcohol/week: 0.0 standard drinks     Comment: rare    Drug use: No    Sexual activity: Not on file   Other Topics Concern    Not on file   Social History Narrative    Retired   , non smoker , non drinker , 2 children  Now a       Social Determinants of Health     Financial Resource Strain:     Difficulty of Paying Living Expenses: Not on file   Food Insecurity:     Worried About [employer] in the Last Year: Not on file    Ranjeet of Food in the Last Year: Not on file   Transportation Needs:     Lack of Transportation (Medical): Not on file    Lack of Transportation (Non-Medical):  Not on file   Physical Activity:     Days of Exercise per Week: Not on file    Minutes of Exercise per Session: Not on file   Stress:     Feeling of Stress : Not on file   Social Connections:     Frequency of Communication with Friends and Family: Not on file    Frequency of Social Gatherings with Friends and Family: Not on file    Attends Judaism Services: Not on file    Active Member of Clubs or Organizations: Not on file    Attends Club or Organization Meetings: Not on file    Marital Status: Not on file   Intimate Partner Violence:     Fear of Current or Ex-Partner: Not on file    Emotionally Abused: Not on file    Physically Abused: Not on file    Sexually Abused: Not on file   Housing Stability:     Unable to Pay for Housing in the Last Year: Not on file    Number of Places Lived in the Last Year: Not on file    Unstable Housing in the Last Year: Not on file       Family History:  family history includes Cancer in her maternal aunt; Diabetes in her mother; Heart Disease in her mother. Allergies:  Patient has no known allergies. Review of Systems:   · Constitutional: there has been no unanticipated weight loss. No change in energy or activity level   · Eyes: No visual changes   · ENT: No Headaches, hearing loss or vertigo. No mouth sores or sore throat. · Cardiovascular: Reviewed in HPI  · Respiratory: No cough or wheezing, no sputum production. · Gastrointestinal: No abdominal pain, appetite loss, blood in stools. No change in bowel or bladder habits. · Genitourinary: No nocturia, dysuria, trouble voiding  · Musculoskeletal:  No gait disturbance, weakness or joint complaints. · Integumentary: No rash or pruritis. · Neurological: No headache, change in muscle strength, numbness or tingling. No change in gait, balance, coordination, mood, affect, memory, mentation, behavior. · Psychiatric: No anxiety or depression  · Endocrine: No malaise or fever  · Hematologic/Lymphatic: No abnormal bruising or bleeding, blood clots or swollen lymph nodes. · Allergic/Immunologic: No nasal congestion or hives. Physical Examination:    Vitals:    07/12/22 1319   BP: 118/80   Site: Left Upper Arm   Position: Sitting   Cuff Size: Medium Adult   Pulse: 95   SpO2: 100%   Weight: 188 lb (85.3 kg)   Height: 5' 1\" (1.549 m)     Body mass index is 35.52 kg/m².      Wt Readings from Last 3 Encounters:   07/12/22 188 lb (85.3 kg)   04/06/22 197 lb (89.4 kg)   01/27/22 198 lb (89.8 kg)      BP Readings from Last 3 Encounters:   07/12/22 118/80   01/27/22 118/60   07/28/21 124/80      Constitutional and General Appearance:  appears younger than stated age, always happy and very pleasant  Eyes - no xanthelasma  Respiratory:  · Normal excursion and expansion without use of accessory muscles  · Resp Auscultation: Normal breath sounds without dullness  Cardiovascular:  · The apical impulses not displaced  · Heart is regular rate and rhythm with normal S1, S2 (tissue valves), mid peaking 2/6 SM  · PMI is normal  · The carotid upstroke is normal, no bruit noted   · JVP is not elevated  · Peripheral pulses are symmetrical  · There is no clubbing, cyanosis of the extremities  · No edema  · No varicosities  · Femoral Arteries: 2+ and equal without bruits  · Pedal Pulses: 2+ and equal   Abdomen:  · No masses or tenderness  · Aorta not palpable  · Normal bowel sounds  Neurological/Psychiatric:  · Alert and oriented x3  · Moves all extremities well  · Exhibits normal gait balance and coordination      Assessment/Plan  1. atrial fibrillation/flutter ([address] she has flutter chronically. coumadin followed by INR clinic at [facility]  5/16 Holter-- showed atrial flutter with AHR 93, few PVC's  9/17/12 Holter--Afib (100%), HR  (avg 101) with PVCs   2. S/P aortic valve replacement   Echo 8/28/20: EF 60%. Grade II DD, A bioprosthetic mitral valve is well seated with peak velocity of 2.3m/s and a mean gradient of 7mmHg. Mild mitral regurgitation. A bioprosthetic artificial aortic valve appears well seated with a maximum velocity of 2.5m/s and a mean gradient of 16mmHg. Moderate tricuspid regurgitation. Estimated pulmonary artery systolic pressure is mildly to moderately elevated at 42 mmHg assuming a right atrial pressure of 3 mmHg. The left atrium is dilated. Echo 4/17/14: EF 55%. reversal of E/A inflow velocities across the mitral valve suggesting impaired left ventricular relaxation. v A prosthetic mitral valve is well seated with maximum gradient of 15 mmHg and a mean gradient of 6 mmHg. Mild mitral regurgitation is present. The left atrium is dilated. A prosthetic aortic valve appears well seated with a maximum gradient of 24mmHg and a mean gradient of 15 mmHg. No evidence of aortic valve regurgitation.   mild to moderate tricuspid regurgitation with RVSP estimated at 41mmHg. Mild pulmonic regurgitation present   3. S/P mitral valve replacement -stable, see above    4. Hyperlipidemia, unspecified hyperlipidemia type -well controlled on labs 4/6/22: ; TRIG 80; HDL 52; LDL 66       PLAN:  Philippe Ott appears stable. No med changes. She is encouraged to stay active. FU 6 months with repeat echo for evaluation of her bioprosthetic valves. .    Scribe's attestation: This note was scribed in the presence of Dr Shaimka Meraz MD by Ginette Zazueta RN. The scribe's documentation has been prepared under my direction and personally reviewed by me in its entirety. I confirm that the note above accurately reflects all work, treatment, procedures, and medical decision making performed by me. Thank you for allowing to me to participate in the care of Corky Enriquez.

## 2022-07-27 ENCOUNTER — ANTI-COAG VISIT (OUTPATIENT)
Dept: PHARMACY | Age: 77
End: 2022-07-27
Payer: MEDICARE

## 2022-07-27 ENCOUNTER — HOSPITAL ENCOUNTER (OUTPATIENT)
Age: 77
Discharge: HOME | End: 2022-07-27
Payer: MEDICARE

## 2022-07-27 DIAGNOSIS — Z95.2 S/P MITRAL VALVE REPLACEMENT: ICD-10-CM

## 2022-07-27 DIAGNOSIS — Z95.2 S/P AORTIC VALVE REPLACEMENT: Primary | ICD-10-CM

## 2022-07-27 DIAGNOSIS — I48.20 CHRONIC ATRIAL FIBRILLATION (HCC): ICD-10-CM

## 2022-07-27 LAB — INTERNATIONAL NORMALIZATION RATIO, POC: 2.2

## 2022-07-27 PROCEDURE — 99211 OFF/OP EST MAY X REQ PHY/QHP: CPT

## 2022-07-27 PROCEDURE — 83036 HEMOGLOBIN GLYCOSYLATED A1C: CPT

## 2022-07-27 PROCEDURE — 85610 PROTHROMBIN TIME: CPT

## 2022-07-27 PROCEDURE — 36415 COLL VENOUS BLD VENIPUNCTURE: CPT

## 2022-07-27 NOTE — PROGRESS NOTES
is Dr. Loi Mckinnon   INR (no units)   Date Value   04/28/2020 2.0 (H)   02/27/2019 1.11   09/17/2018 4   08/20/2018 2.8     INR,(POC) (no units)   Date Value   06/29/2022 2.4   06/01/2022 2.5   05/04/2022 2.5   04/06/2022 1.8       For Pharmacy Admin Tracking Only    Total # of Interventions Recommended: 0  Total # of Interventions Accepted: 0  Time Spent (min): [address] Lit[address] Andersen [facility], PharmD

## 2022-07-28 LAB
ESTIMATED AVERAGE GLUCOSE: 137 MG/DL
HBA1C MFR BLD: 6.4 %

## 2022-08-24 ENCOUNTER — ANTI-COAG VISIT (OUTPATIENT)
Dept: PHARMACY | Age: 77
End: 2022-08-24
Payer: MEDICARE

## 2022-08-24 DIAGNOSIS — I48.20 CHRONIC ATRIAL FIBRILLATION (HCC): ICD-10-CM

## 2022-08-24 DIAGNOSIS — Z95.2 S/P AORTIC VALVE REPLACEMENT: Primary | ICD-10-CM

## 2022-08-24 DIAGNOSIS — Z95.2 S/P MITRAL VALVE REPLACEMENT: ICD-10-CM

## 2022-08-24 LAB — INTERNATIONAL NORMALIZATION RATIO, POC: 3.6

## 2022-08-24 PROCEDURE — 85610 PROTHROMBIN TIME: CPT

## 2022-08-24 PROCEDURE — 99211 OFF/OP EST MAY X REQ PHY/QHP: CPT

## 2022-08-24 NOTE — PROGRESS NOTES
Hermila Moya is a 78 y.o. y/o female with history of Afib, Aortic and Mitral Valve  Porcine valve. She presents today for anticoagulation monitoring and adjustment. Pertinent PMH: The aortic and mitral valves are porcine and were replaced at the same time on March 9, 2010 by Dr. Román Dewitt. Patient Reported Findings:  Yes     No  [x]   []       Patient verifies current dosing regimen as listed- confirms   []   [x]       S/S bleeding/bruising/swelling/SOB -denies   []   [x]       Blood in urine or stool -denies   []   [x]       Procedures scheduled in the future at this time  -denies    []   [x]       Missed Dose- denies    []   [x]       Extra Dose- denies    []   [x]       Change in medications- ran out of glucosamine chondroitin but plans to resume---> started MVI 1 month ago, not sure brand---> Stopped MVI and started probiotic --> resumed vit d3 and MVI --> no changes---> some tylenol     []   [x]       Change in health/diet/appetite. Her norm would be spinach, broccoli, shahzad or turnip greens twice a week and salads with iceberg other lower vitamin K --> Patient reports having shahzad greens 3 times weekly, this is an increase in vitamin K content from previous visits --> erratic, no NVD---> was not eating many greens but then had a lot yesterday --> less greens, no NVD  []   [x]       Change in alcohol use ---> Denies  []   [x]       Change in activity  []   [x]       Hospital admission  []   [x]       Emergency department visit  []   [x]       Other complaints     Clinical Outcomes:  Yes     No  []   [x]       Major bleeding event  []   [x]       Thromboembolic event    Duration of warfarin Therapy: Indefinitely  INR Range:  2.0-3.0   Keep at 2.5-3.0 per the collaborative agreement from Dr. Christiano lBevins    INR is 3.6 today dt tylenol and less greens.    Hold today then continue taking 2.5 mg on Mon only and 5 mg all other days   Encouraged to maintain a consistency of vegetables/salads  Recheck INR in 4 weeks, 9/21    **consent form signed 11/30/2021    Referring is Dr. Susan Nix   INR (no units)   Date Value   04/28/2020 2.0 (H)   02/27/2019 1.11   09/17/2018 4   08/20/2018 2.8     INR,(POC) (no units)   Date Value   08/24/2022 3.6   07/27/2022 2.2   06/29/2022 2.4   06/01/2022 2.5     For Pharmacy Admin Tracking Only    Intervention Detail: Dose Adjustment: 1, reason: Therapy Optimization  Total # of Interventions Recommended: 1  Total # of Interventions Accepted: 1  Time Spent (min): [address], [address] [facility], PharmD

## 2022-09-07 RX ORDER — SIMVASTATIN 40 MG
TABLET ORAL
Qty: 90 TABLET | Refills: 3 | Status: SHIPPED | OUTPATIENT
Start: 2022-09-07

## 2022-09-13 RX ORDER — CARVEDILOL 6.25 MG/1
TABLET ORAL
Qty: 180 TABLET | Refills: 3 | Status: SHIPPED | OUTPATIENT
Start: 2022-09-13

## 2022-09-21 ENCOUNTER — ANTI-COAG VISIT (OUTPATIENT)
Dept: PHARMACY | Age: 77
End: 2022-09-21
Payer: MEDICARE

## 2022-09-21 DIAGNOSIS — I48.20 CHRONIC ATRIAL FIBRILLATION (HCC): ICD-10-CM

## 2022-09-21 DIAGNOSIS — Z95.2 S/P AORTIC VALVE REPLACEMENT: Primary | ICD-10-CM

## 2022-09-21 DIAGNOSIS — Z95.2 S/P MITRAL VALVE REPLACEMENT: ICD-10-CM

## 2022-09-21 LAB — INTERNATIONAL NORMALIZATION RATIO, POC: 1.9

## 2022-09-21 PROCEDURE — 99211 OFF/OP EST MAY X REQ PHY/QHP: CPT

## 2022-09-21 PROCEDURE — 85610 PROTHROMBIN TIME: CPT

## 2022-09-21 NOTE — TELEPHONE ENCOUNTER
Warfarin prescription phoned into [facility] to [address] [facility] under Dr. Shamika Meraz  Warfarin 5 mg tabs  Take 2.5 mg on Mon and 5 mg all other days of the week  90 days   2 refills

## 2022-09-21 NOTE — PROGRESS NOTES
Phyllis Owens Ms. Truong Silverman is a 78 y.o. y/o female with history of Afib, Aortic and Mitral Valve  Porcine valve. She presents today for anticoagulation monitoring and adjustment. Pertinent PMH: The aortic and mitral valves are porcine and were replaced at the same time on March 9, 2010 by Dr. Zainab Farley. Patient Reported Findings:  Yes     No  [x]   []       Patient verifies current dosing regimen as listed- confirms   []   [x]       S/S bleeding/bruising/swelling/SOB -denies   []   [x]       Blood in urine or stool -denies   []   [x]       Procedures scheduled in the future at this time  -denies    []   [x]       Missed Dose- denies    []   [x]       Extra Dose- denies   [x]   []       Change in medications- ran out of glucosamine chondroitin but plans to resume---> started MVI 1 month ago, not sure brand---> Stopped MVI and started probiotic --> resumed vit d3 and MVI --> no changes---> some tylenol --> started MVI and probiotics     [x]   []       Change in health/diet/appetite. Her norm would be spinach, broccoli, shahzad or turnip greens twice a week and salads with iceberg other lower vitamin K --> Patient reports having shahzad greens 3 times weekly, this is an increase in vitamin K content from previous visits --> erratic, no NVD---> was not eating many greens but then had a lot yesterday --> less greens, no NVD --> returned to normal vit k intake .  States was having stomach   []   [x]       Change in alcohol use ---> Denies  []   [x]       Change in activity  []   [x]       Hospital admission  []   [x]       Emergency department visit  []   [x]       Other complaints     Clinical Outcomes:  Yes     No  []   [x]       Major bleeding event  []   [x]       Thromboembolic event    Duration of warfarin Therapy: Indefinitely  INR Range:  2.0-3.0   Keep at 2.5-3.0 per the collaborative agreement from Dr. Vineet Baumann    INR is 1.9 today possibly from resuming MVI and vit k intake   Continue taking 2.5 mg on Mon only and 5 mg all other days   Encouraged to maintain a consistency of vegetables/salads  Refilled warfarin at opp  Recheck INR in 4 weeks, 9/21    **consent form signed 11/30/2021    Referring is Dr. Loi Mckinnon   INR (no units)   Date Value   04/28/2020 2.0 (H)   02/27/2019 1.11   09/17/2018 4   08/20/2018 2.8     INR,(POC) (no units)   Date Value   08/24/2022 3.6   07/27/2022 2.2   06/29/2022 2.4   06/01/2022 2.5     For Pharmacy Admin Tracking Only    Intervention Detail: Refill(s) Provided  Total # of Interventions Recommended: 1  Total # of Interventions Accepted: 1  Time Spent (min): Jennifer Chavarria, [facility], PharmD

## 2022-10-19 ENCOUNTER — ANTI-COAG VISIT (OUTPATIENT)
Dept: PHARMACY | Age: 77
End: 2022-10-19
Payer: MEDICARE

## 2022-10-19 LAB — INTERNATIONAL NORMALIZATION RATIO, POC: 2.3

## 2022-10-19 PROCEDURE — 99211 OFF/OP EST MAY X REQ PHY/QHP: CPT

## 2022-10-19 PROCEDURE — 85610 PROTHROMBIN TIME: CPT

## 2022-10-19 NOTE — PROGRESS NOTES
Peewee Richardson is a 78 y.o. y/o female with history of Afib, Aortic and Mitral Valve  Porcine valve. She presents today for anticoagulation monitoring and adjustment. Pertinent PMH: The aortic and mitral valves are porcine and were replaced at the same time on March 9, 2010 by Dr. Nas Romero. Patient Reported Findings:  Yes     No  [x]   []       Patient verifies current dosing regimen as listed- confirms   []   [x]       S/S bleeding/bruising/swelling/SOB -denies   []   [x]       Blood in urine or stool -denies   []   [x]       Procedures scheduled in the future at this time  -denies    []   [x]       Missed Dose- denies    []   [x]       Extra Dose- denies   []   [x]       Change in medications- ran out of glucosamine chondroitin but plans to resume---> started MVI 1 month ago, not sure brand---> Stopped MVI and started probiotic --> resumed vit d3 and MVI --> some tylenol --> started MVI and probiotics --> no changes    [x]   []       Change in health/diet/appetite. Her norm would be spinach, broccoli, shahzad or turnip greens twice a week and salads with iceberg other lower vitamin K --> Patient reports having shahzad greens 3 times weekly, this is an increase in vitamin K content from previous visits --> erratic, no NVD---> was not eating many greens but then had a lot yesterday --> less greens, no NVD --> returned to normal vit k intake .  States was having stomach --> maybe 2-3x/week, pt unsure   []   [x]       Change in alcohol use ---> Denies  []   [x]       Change in activity  []   [x]       Hospital admission  []   [x]       Emergency department visit  []   [x]       Other complaints     Clinical Outcomes:  Yes     No  []   [x]       Major bleeding event  []   [x]       Thromboembolic event    Duration of warfarin Therapy: Indefinitely  INR Range:  2.0-3.0   Keep at 2.5-3.0 per the collaborative agreement from Dr. Hermes Carcamo    INR is 2.3 today   Continue taking 2.5 mg on Mon only and 5 mg all other days   Encouraged to maintain a consistency of vegetables/salads  Recheck INR in 4 weeks, 11/16    **consent form signed 11/30/2021    Referring is Dr. Doretha Shirley   INR (no units)   Date Value   04/28/2020 2.0 (H)   02/27/2019 1.11   09/17/2018 4   08/20/2018 2.8     INR,(POC) (no units)   Date Value   09/21/2022 1.9   08/24/2022 3.6   07/27/2022 2.2   06/29/2022 2.4     For Pharmacy Admin Tracking Only    Total # of Interventions Recommended: 0  Total # of Interventions Accepted: 0  Time Spent (min): [address] Sonia Kruger, [address] [facility], PharmD

## 2022-11-14 ENCOUNTER — TELEPHONE (OUTPATIENT)
Dept: PHARMACY | Age: 77
End: 2022-11-14

## 2022-11-18 ENCOUNTER — ANTI-COAG VISIT (OUTPATIENT)
Dept: PHARMACY | Age: 77
End: 2022-11-18
Payer: MEDICARE

## 2022-11-18 DIAGNOSIS — Z95.2 S/P MITRAL VALVE REPLACEMENT: ICD-10-CM

## 2022-11-18 DIAGNOSIS — I48.20 CHRONIC ATRIAL FIBRILLATION (HCC): ICD-10-CM

## 2022-11-18 DIAGNOSIS — Z95.2 S/P AORTIC VALVE REPLACEMENT: Primary | ICD-10-CM

## 2022-11-18 LAB — INTERNATIONAL NORMALIZATION RATIO, POC: 1.7

## 2022-11-18 PROCEDURE — 99212 OFFICE O/P EST SF 10 MIN: CPT

## 2022-11-18 PROCEDURE — 85610 PROTHROMBIN TIME: CPT

## 2022-11-18 NOTE — PROGRESS NOTES
Jw Zacarias Ms. Ani Ledbetter is a 78 y.o. y/o female with history of Afib, Aortic and Mitral Valve  Porcine valve. She presents today for anticoagulation monitoring and adjustment. Pertinent PMH: The aortic and mitral valves are porcine and were replaced at the same time on March 9, 2010 by Dr. Glenn Rasheed. Patient Reported Findings:  Yes     No  [x]   []       Patient verifies current dosing regimen as listed- confirms   []   [x]       S/S bleeding/bruising/swelling/SOB -denies   []   [x]       Blood in urine or stool -denies   []   [x]       Procedures scheduled in the future at this time  -denies    []   [x]       Missed Dose- denies    []   [x]       Extra Dose- denies   []   [x]       Change in medications- ran out of glucosamine chondroitin but plans to resume---> started MVI 1 month ago, not sure brand---> Stopped MVI and started probiotic --> resumed vit d3 and MVI --> some tylenol --> started MVI and probiotics --> no changes---> resumed probiotic and turmeric, is not sure if she is taking MVI or not      [x]   []       Change in health/diet/appetite. Her norm would be spinach, broccoli, shahzad or turnip greens twice a week and salads with iceberg other lower vitamin K --> Patient reports having shahzad greens 3 times weekly, this is an increase in vitamin K content from previous visits --> erratic, no NVD---> was not eating many greens but then had a lot yesterday --> less greens, no NVD --> returned to normal vit k intake .  States was having stomach --> maybe 2-3x/week, pt unsure ---> started to eat more greens again   []   [x]       Change in alcohol use ---> Denies  []   [x]       Change in activity  []   [x]       Hospital admission  []   [x]       Emergency department visit  []   [x]       Other complaints     Clinical Outcomes:  Yes     No  []   [x]       Major bleeding event  []   [x]       Thromboembolic event    Duration of warfarin Therapy: Indefinitely  INR Range:  2.0-3.0   Keep at 2.5-3.0 per the collaborative agreement from Dr. Lazarus Powers    INR is 1.7 today secondary to erratic diet and OTC medications. Reminded pt to remain consistent. Take 7.5mg tonight then continue taking 2.5 mg on Mon only and 5 mg all other days. Encouraged to maintain a consistency of vegetables/salads  May need RF next time.    Recheck INR in 2 weeks, 12/2    **consent form signed 11/30/2021    Referring is Dr. Elois Merlin   INR (no units)   Date Value   04/28/2020 2.0 (H)   02/27/2019 1.11   09/17/2018 4   08/20/2018 2.8     INR,(POC) (no units)   Date Value   10/19/2022 2.3   09/21/2022 1.9   08/24/2022 3.6   07/27/2022 2.2     For Pharmacy Admin Tracking Only    Intervention Detail: Dose Adjustment: 1, reason: Therapy Optimization  Total # of Interventions Recommended: 1  Total # of Interventions Accepted: 1  Time Spent (min): [address] [facility] [place], PharmD

## 2022-12-02 ENCOUNTER — ANTI-COAG VISIT (OUTPATIENT)
Dept: PHARMACY | Age: 77
End: 2022-12-02
Payer: MEDICARE

## 2022-12-02 DIAGNOSIS — Z95.2 S/P AORTIC VALVE REPLACEMENT: Primary | ICD-10-CM

## 2022-12-02 DIAGNOSIS — Z95.2 S/P MITRAL VALVE REPLACEMENT: ICD-10-CM

## 2022-12-02 DIAGNOSIS — I48.20 CHRONIC ATRIAL FIBRILLATION (HCC): ICD-10-CM

## 2022-12-02 LAB — INTERNATIONAL NORMALIZATION RATIO, POC: 2.6

## 2022-12-02 PROCEDURE — 99211 OFF/OP EST MAY X REQ PHY/QHP: CPT

## 2022-12-02 PROCEDURE — 85610 PROTHROMBIN TIME: CPT

## 2022-12-02 NOTE — PROGRESS NOTES
Elvis Selby Ms. Juan M Albright is a 78 y.o. y/o female with history of Afib, Aortic and Mitral Valve  Porcine valve. She presents today for anticoagulation monitoring and adjustment. Pertinent PMH: The aortic and mitral valves are porcine and were replaced at the same time on March 9, 2010 by Dr. Sena Nazario. Patient Reported Findings:  Yes     No  [x]   []       Patient verifies current dosing regimen as listed- confirms   []   [x]       S/S bleeding/bruising/swelling/SOB -denies   []   [x]       Blood in urine or stool -denies   []   [x]       Procedures scheduled in the future at this time  -denies    []   [x]       Missed Dose- denies    []   [x]       Extra Dose- denies   []   [x]       Change in medications- ran out of glucosamine chondroitin but plans to resume---> started MVI 1 month ago, not sure brand---> Stopped MVI and started probiotic --> resumed vit d3 and MVI --> some tylenol --> started MVI and probiotics --> no changes---> resumed probiotic and turmeric, is not sure if she is taking MVI or not ---> is taking MVI but erratic, explained best to be consistent      [x]   []       Change in health/diet/appetite. Her norm would be spinach, broccoli, shahzad or turnip greens twice a week and salads with iceberg other lower vitamin K --> Patient reports having shahzad greens 3 times weekly, this is an increase in vitamin K content from previous visits --> erratic, no NVD---> was not eating many greens but then had a lot yesterday --> less greens, no NVD --> returned to normal vit k intake .  States was having stomach --> maybe 2-3x/week, pt unsure ---> started to eat more greens again---> spinach last night    []   [x]       Change in alcohol use ---> Denies  []   [x]       Change in activity  []   [x]       Hospital admission  []   [x]       Emergency department visit  []   [x]       Other complaints     Clinical Outcomes:  Yes     No  []   [x]       Major bleeding event  []   [x]       Thromboembolic event    Duration of warfarin Therapy: Indefinitely  INR Range:  2.0-3.0   Keep at 2.5-3.0 per the collaborative agreement from Dr. Frank Schlatter    INR is 2.6 today   Continue taking 2.5 mg on Mon only and 5 mg all other days. Encouraged to maintain a consistency of vegetables/salads  RF called into [facility].   Recheck INR in 3 weeks, 12/23    **consent form signed 11/30/2021    Referring is Dr. Amy Chappell   INR (no units)   Date Value   04/28/2020 2.0 (H)   02/27/2019 1.11   09/17/2018 4   08/20/2018 2.8     INR,(POC) (no units)   Date Value   12/02/2022 2.6   11/18/2022 1.7   10/19/2022 2.3   09/21/2022 1.9     For Pharmacy Admin Tracking Only    Intervention Detail: Refill(s) Provided  Total # of Interventions Recommended: 1  Total # of Interventions Accepted: 1  Time Spent (min): 13982 Nneka Isbell, [address] [facility], PharmD

## 2022-12-13 ENCOUNTER — TELEPHONE (OUTPATIENT)
Dept: CARDIOLOGY CLINIC | Age: 77
End: 2022-12-13

## 2022-12-22 ENCOUNTER — ANTI-COAG VISIT (OUTPATIENT)
Dept: PHARMACY | Age: 77
End: 2022-12-22
Payer: MEDICARE

## 2022-12-22 DIAGNOSIS — I48.20 CHRONIC ATRIAL FIBRILLATION (HCC): ICD-10-CM

## 2022-12-22 DIAGNOSIS — Z95.2 S/P AORTIC VALVE REPLACEMENT: Primary | ICD-10-CM

## 2022-12-22 DIAGNOSIS — Z95.2 S/P MITRAL VALVE REPLACEMENT: ICD-10-CM

## 2022-12-22 LAB — INTERNATIONAL NORMALIZATION RATIO, POC: 1.6

## 2022-12-22 PROCEDURE — 85610 PROTHROMBIN TIME: CPT

## 2022-12-22 PROCEDURE — 99212 OFFICE O/P EST SF 10 MIN: CPT

## 2022-12-22 NOTE — PROGRESS NOTES
[facility] Ms. Srikanth More is a 78 y.o. y/o female with history of Afib, Aortic and Mitral Valve  Porcine valve. She presents today for anticoagulation monitoring and adjustment. Pertinent PMH: The aortic and mitral valves are porcine and were replaced at the same time on March 9, 2010 by Dr. Cyrus Duron. Patient Reported Findings:  Yes     No  [x]   []       Patient verifies current dosing regimen as listed- confirms   []   [x]       S/S bleeding/bruising/swelling/SOB -denies   []   [x]       Blood in urine or stool -denies   []   [x]       Procedures scheduled in the future at this time  -denies    []   [x]       Missed Dose- denies    []   [x]       Extra Dose- denies   []   [x]       Change in medications- ran out of glucosamine chondroitin but plans to resume---> started MVI 1 month ago, not sure brand---> Stopped MVI and started probiotic --> resumed vit d3 and MVI --> some tylenol --> started MVI and probiotics --> no changes---> resumed probiotic and turmeric, is not sure if she is taking MVI or not ---> is taking MVI but erratic, explained best to be consistent---> back on probiotic and MVI, wants to start fish oil, explained interaction       [x]   []       Change in health/diet/appetite. Her norm would be spinach, broccoli, shahzad or turnip greens twice a week and salads with iceberg other lower vitamin K --> Patient reports having shahzad greens 3 times weekly, this is an increase in vitamin K content from previous visits --> erratic, no NVD---> was not eating many greens but then had a lot yesterday --> less greens, no NVD --> returned to normal vit k intake .  States was having stomach --> maybe 2-3x/week, pt unsure ---> started to eat more greens again---> spinach last night ---> had extra greens past 2 days, asked to be consistent    []   [x]       Change in alcohol use ---> Denies  []   [x]       Change in activity  []   [x]       Hospital admission  []   [x]       Emergency department visit  [] [x]       Other complaints     Clinical Outcomes:  Yes     No  []   [x]       Major bleeding event  []   [x]       Thromboembolic event    Duration of warfarin Therapy: Indefinitely  INR Range:  2.0-3.0   Keep at 2.5-3.0 per the collaborative agreement from Dr. Duyen Munroe    INR is 1.6 today dt more greens. Pt will go back to normal and be consistent. Also wanting to start fish oil. Will need to monitor this with patient greens at next visit. Take 7.5mg tonight then continue taking 2.5 mg on Mon only and 5 mg all other days.    Encouraged to maintain a consistency of vegetables/salads  Recheck INR in 2 weeks, 1/5/2023    **consent form signed 11/30/2021    Referring is Dr. Leslye Xie   INR (no units)   Date Value   04/28/2020 2.0 (H)   02/27/2019 1.11   09/17/2018 4   08/20/2018 2.8     INR,(POC) (no units)   Date Value   12/22/2022 1.6   12/02/2022 2.6   11/18/2022 1.7   10/19/2022 2.3     For Pharmacy Admin Tracking Only    Intervention Detail: Dose Adjustment: 1, reason: Therapy Optimization  Total # of Interventions Recommended: 1  Total # of Interventions Accepted: 1  Time Spent (min): 220 Hanh Blank, [facility], PharmD

## 2023-01-05 ENCOUNTER — ANTI-COAG VISIT (OUTPATIENT)
Dept: PHARMACY | Age: 78
End: 2023-01-05
Payer: MEDICARE

## 2023-01-05 DIAGNOSIS — Z95.2 S/P AORTIC VALVE REPLACEMENT: Primary | ICD-10-CM

## 2023-01-05 DIAGNOSIS — Z95.2 S/P MITRAL VALVE REPLACEMENT: ICD-10-CM

## 2023-01-05 DIAGNOSIS — I48.20 CHRONIC ATRIAL FIBRILLATION (HCC): ICD-10-CM

## 2023-01-05 LAB — INTERNATIONAL NORMALIZATION RATIO, POC: 2.1

## 2023-01-05 PROCEDURE — 85610 PROTHROMBIN TIME: CPT

## 2023-01-05 PROCEDURE — 99211 OFF/OP EST MAY X REQ PHY/QHP: CPT

## 2023-01-05 NOTE — PROGRESS NOTES
[facility] Ms. Mariposa Stephens is a 78 y.o. y/o female with history of Afib, Aortic and Mitral Valve  Porcine valve. She presents today for anticoagulation monitoring and adjustment. Pertinent PMH: The aortic and mitral valves are porcine and were replaced at the same time on March 9, 2010 by Dr. Abhishek Ennis. Patient Reported Findings:  Yes     No  [x]   []       Patient verifies current dosing regimen as listed- confirms   []   [x]       S/S bleeding/bruising/swelling/SOB -denies   []   [x]       Blood in urine or stool -denies   []   [x]       Procedures scheduled in the future at this time  -denies    []   [x]       Missed Dose- denies    []   [x]       Extra Dose- denies   [x]   []       Change in medications- ran out of glucosamine chondroitin but plans to resume---> resumed vit d3 and MVI --> some tylenol --> started MVI and probiotics --> resumed probiotic and turmeric, is not sure if she is taking MVI or not ---> is taking MVI but erratic, explained best to be consistent---> back on probiotic and MVI, wants to start fish oil, explained interaction --> started fish oil BID       [x]   []       Change in health/diet/appetite. Her norm would be spinach, broccoli, shahzad or turnip greens twice a week and salads with iceberg other lower vitamin K --> Patient reports having shahzad greens 3 times weekly, this is an increase in vitamin K content from previous visits --> erratic, no NVD---> was not eating many greens but then had a lot yesterday --> less greens, no NVD --> returned to normal vit k intake .  States was having stomach --> maybe 2-3x/week, pt unsure ---> started to eat more greens again---> spinach last night ---> had extra greens past 2 days, asked to be consistent --> has been eating smaller amounts of vit k    []   [x]       Change in alcohol use ---> Denies  []   [x]       Change in activity  []   [x]       Hospital admission  []   [x]       Emergency department visit  []   [x]       Other complaints     Clinical Outcomes:  Yes     No  []   [x]       Major bleeding event  []   [x]       Thromboembolic event    Duration of warfarin Therapy: Indefinitely  INR Range:  2.0-3.0   Keep at 2.5-3.0 per the collaborative agreement from Dr. Hurtado Half    INR is 2.1 today    Continue taking 2.5 mg on Mon only and 5 mg all other days.    Encouraged to maintain a consistency of vegetables/salads  Recheck INR in 3 weeks, 1/26    **consent form signed 11/30/2021    Referring is Dr. Morgan Arce   INR (no units)   Date Value   04/28/2020 2.0 (H)   02/27/2019 1.11   09/17/2018 4   08/20/2018 2.8     INR,(POC) (no units)   Date Value   12/22/2022 1.6   12/02/2022 2.6   11/18/2022 1.7   10/19/2022 2.3     For Pharmacy Admin Tracking Only    Total # of Interventions Recommended: 0  Total # of Interventions Accepted: 0  Time Spent (min): 89 Sonia Kruger, [facility] [place], PharmD

## 2023-01-26 ENCOUNTER — ANTI-COAG VISIT (OUTPATIENT)
Dept: PHARMACY | Age: 78
End: 2023-01-26
Payer: MEDICARE

## 2023-01-26 DIAGNOSIS — Z95.2 S/P MITRAL VALVE REPLACEMENT: ICD-10-CM

## 2023-01-26 DIAGNOSIS — Z95.2 S/P AORTIC VALVE REPLACEMENT: Primary | ICD-10-CM

## 2023-01-26 DIAGNOSIS — I48.20 CHRONIC ATRIAL FIBRILLATION (HCC): ICD-10-CM

## 2023-01-26 LAB — INTERNATIONAL NORMALIZATION RATIO, POC: 2.2

## 2023-01-26 PROCEDURE — 85610 PROTHROMBIN TIME: CPT

## 2023-01-26 PROCEDURE — 99211 OFF/OP EST MAY X REQ PHY/QHP: CPT

## 2023-01-26 NOTE — PROGRESS NOTES
[facility] Ms. Suzette Luna is a 78 y.o. y/o female with history of Afib, Aortic and Mitral Valve  Porcine valve. She presents today for anticoagulation monitoring and adjustment. Pertinent PMH: The aortic and mitral valves are porcine and were replaced at the same time on March 9, 2010 by Dr. Herberth Booker. Patient Reported Findings:  Yes     No  [x]   []       Patient verifies current dosing regimen as listed- confirms   []   [x]       S/S bleeding/bruising/swelling/SOB -denies   []   [x]       Blood in urine or stool -denies   []   [x]       Procedures scheduled in the future at this time  -denies    []   [x]       Missed Dose- denies    []   [x]       Extra Dose- denies   [x]   []       Change in medications- ran out of glucosamine chondroitin but plans to resume---> resumed vit d3 and MVI --> some tylenol --> started MVI and probiotics --> resumed probiotic and turmeric, is not sure if she is taking MVI or not ---> is taking MVI but erratic, explained best to be consistent---> back on probiotic and MVI, wants to start fish oil, explained interaction --> started fish oil BID ---> denies changes       [x]   []       Change in health/diet/appetite. Her norm would be spinach, broccoli, shahzad or turnip greens twice a week and salads with iceberg other lower vitamin K --> Patient reports having shahzad greens 3 times weekly, this is an increase in vitamin K content from previous visits --> erratic, no NVD---> was not eating many greens but then had a lot yesterday --> less greens, no NVD --> returned to normal vit k intake .  States was having stomach --> maybe 2-3x/week, pt unsure ---> started to eat more greens again---> spinach last night ---> had extra greens past 2 days, asked to be consistent --> has been eating smaller amounts of vit k ---> had spinach last night, no changes, no NVD   []   [x]       Change in alcohol use ---> Denies  []   [x]       Change in activity  []   [x]       Hospital admission  [] [x]       Emergency department visit  []   [x]       Other complaints     Clinical Outcomes:  Yes     No  []   [x]       Major bleeding event  []   [x]       Thromboembolic event    Duration of warfarin Therapy: Indefinitely  INR Range:  2.0-3.0   Keep at 2.5-3.0 per the collaborative agreement from Dr. Christiano Blevins    INR is 2.2 today    Continue taking 2.5 mg on Mon only and 5 mg all other days.    Encouraged to maintain a consistency of vegetables/salads  Recheck INR in 4 weeks, 2/23    Consent form signed 1/26/2023    Referring is Dr. Lele Orosco   INR (no units)   Date Value   04/28/2020 2.0 (H)   02/27/2019 1.11   09/17/2018 4   08/20/2018 2.8     INR,(POC) (no units)   Date Value   01/26/2023 2.2   01/05/2023 2.1   12/22/2022 1.6   12/02/2022 2.6     For Pharmacy Admin Tracking Only    Total # of Interventions Recommended: 0  Total # of Interventions Accepted: 0  Time Spent (min): 15

## 2023-02-23 ENCOUNTER — ANTI-COAG VISIT (OUTPATIENT)
Dept: PHARMACY | Age: 78
End: 2023-02-23
Payer: MEDICARE

## 2023-02-23 DIAGNOSIS — Z95.2 S/P AORTIC VALVE REPLACEMENT: Primary | ICD-10-CM

## 2023-02-23 DIAGNOSIS — I48.20 CHRONIC ATRIAL FIBRILLATION (HCC): ICD-10-CM

## 2023-02-23 DIAGNOSIS — Z95.2 S/P MITRAL VALVE REPLACEMENT: ICD-10-CM

## 2023-02-23 LAB — INTERNATIONAL NORMALIZATION RATIO, POC: 2

## 2023-02-23 PROCEDURE — 99211 OFF/OP EST MAY X REQ PHY/QHP: CPT

## 2023-02-23 PROCEDURE — 85610 PROTHROMBIN TIME: CPT

## 2023-02-23 NOTE — PROGRESS NOTES
[facility] Ms. Marlon Rodriguez is a 78 y.o. y/o female with history of Afib, Aortic and Mitral Valve  Porcine valve. She presents today for anticoagulation monitoring and adjustment. Pertinent PMH: The aortic and mitral valves are porcine and were replaced at the same time on March 9, 2010 by Dr. Berny Oliveira. Patient Reported Findings:  Yes     No  [x]   []       Patient verifies current dosing regimen as listed- confirms   []   [x]       S/S bleeding/bruising/swelling/SOB -denies   []   [x]       Blood in urine or stool -denies   []   [x]       Procedures scheduled in the future at this time  -denies    []   [x]       Missed Dose- denies    []   [x]       Extra Dose- denies   [x]   []       Change in medications- ran out of glucosamine chondroitin but plans to resume---> resumed vit d3 and MVI --> some tylenol --> started MVI and probiotics --> resumed probiotic and turmeric, is not sure if she is taking MVI or not ---> is taking MVI but erratic, explained best to be consistent---> back on probiotic and MVI, wants to start fish oil, explained interaction --> started fish oil BID ---> denies changes --> started on Metformin 500mg BID x 15 days      [x]   []       Change in health/diet/appetite. Her norm would be spinach, broccoli, shahzad or turnip greens twice a week and salads with iceberg other lower vitamin K --> Patient reports having shahzad greens 3 times weekly, this is an increase in vitamin K content from previous visits --> erratic, no NVD---> was not eating many greens but then had a lot yesterday --> less greens, no NVD --> returned to normal vit k intake .  States was having stomach --> maybe 2-3x/week, pt unsure ---> started to eat more greens again---> spinach last night ---> had extra greens past 2 days, asked to be consistent --> has been eating smaller amounts of vit k ---> had spinach last night, no changes, no NVD -->eating a small amount of greens three times/week  []   [x]       Change in alcohol use ---> Denies  []   [x]       Change in activity  []   [x]       Hospital admission  []   [x]       Emergency department visit  []   [x]       Other complaints     Clinical Outcomes:  Yes     No  []   [x]       Major bleeding event  []   [x]       Thromboembolic event    Duration of warfarin Therapy: Indefinitely  INR Range:  2.0-3.0   Keep at 2.5-3.0 per the collaborative agreement from Dr. Jose Guadalupe Castellanos    INR is 2.0 today    Continue taking 2.5 mg on Mon only and 5 mg all other days. Encouraged to maintain a consistency of vegetables/salads  Started Metformin 500 mg BID x 15 days, then will see PCP again.   Recheck INR in 4 weeks, 3/23    Consent form signed 1/26/2023    Referring is Dr. Allyson Kirk   INR (no units)   Date Value   04/28/2020 2.0 (H)   02/27/2019 1.11   09/17/2018 4   08/20/2018 2.8     INR,(POC) (no units)   Date Value   01/26/2023 2.2   01/05/2023 2.1   12/22/2022 1.6   12/02/2022 2.6       For Pharmacy Admin Tracking Only    Total # of Interventions Recommended: 0  Total # of Interventions Accepted: 0  Time Spent (min): 15

## 2023-03-01 NOTE — PROGRESS NOTES
[address]   Cardiac Evaluation      Patient: Srikanth More  YOB: 1945  Date: 3/10/23       Chief Complaint   Patient presents with    Atrial Fibrillation    Hyperlipidemia          Referring provider: Kathryn Cohn MD    History of Present Illness:   Ms Petrina Oppenheim is seen today for echo and follow up visit. History includes porcine aortic/mitral valve replacements in 2010. She has hyperlipidemia and atrial flutter. Ashli Trejo had rheumatic fever at the age of 17. She is retired from [employer]. Today, Ms Petrina Oppenheim states she is well. She denies chest pain, palpitations, BUENROSTRO, dizziness, or edema. She exercises 1-2 times per week in a pool at a gym. Past Medical History:   has a past medical history of Aortic valve disease, rheumatic, Atrial flutter ([address] Hypercholesteremia, and Mitral Valve Disease. Surgical History:   has a past surgical history that includes Breast biopsy; Dilation and curettage of uterus; Cardioversion (2/4/13); Colonoscopy; Aortic valve replacement (03/2010); Mitral valve replacement (03/2010); and Breast biopsy (Right, 2/27/2019). Current Outpatient Medications   Medication Sig Dispense Refill    carvedilol (COREG) 6.25 MG tablet TAKE 1 TABLET TWICE DAILY WITH FOOD 180 tablet 3    simvastatin (ZOCOR) 40 MG tablet TAKE 1 TABLET EVERY NIGHT 90 tablet 3    latanoprost (XALATAN) 0.005 % ophthalmic solution INSTILL 1 DROP INTO BOTH EYES EVERY EVENING AS DIRECTED      warfarin (COUMADIN) 5 MG tablet One tab po nightly and on Friday 1 1/2 tab (Patient taking differently: Continue taking 2.5 mg on Mon only and 5 mg all other days.      Adjusted by F [facility]) 30 tablet 3    Cholecalciferol (VITAMIN D3) 2000 units CAPS Take by mouth      acetaminophen (TYLENOL) 500 MG tablet Take 500 mg by mouth every 6 hours as needed for Pain      Probiotic Product (PROBIOTIC DAILY PO) Take by mouth daily      Multiple Vitamins-Minerals (CENTRUM SILVER) TABS Take 1 tablet by mouth as needed        No current facility-administered medications for this visit. Social History:  Social History     Socioeconomic History    Marital status:      Spouse name: Not on file    Number of children: Not on file    Years of education: Not on file    Highest education level: Not on file   Occupational History    Not on file   Tobacco Use    Smoking status: Never    Smokeless tobacco: Never   Vaping Use    Vaping Use: Never used   Substance and Sexual Activity    Alcohol use: Yes     Alcohol/week: 0.0 standard drinks     Comment: rare    Drug use: No    Sexual activity: Not on file   Other Topics Concern    Not on file   Social History Narrative    Retired  [address] , non smoker , non drinker , 2 children  Now a       Social Determinants of Health     Financial Resource Strain: Not on file   Food Insecurity: Not on file   Transportation Needs: Not on file   Physical Activity: Not on file   Stress: Not on file   Social Connections: Not on file   Intimate Partner Violence: Not on file   Housing Stability: Not on file       Family History:  family history includes Cancer in her maternal aunt; Diabetes in her mother; Heart Disease in her mother. Allergies:  Patient has no known allergies. Review of Systems:   Constitutional: there has been no unanticipated weight loss. No change in energy or activity level   Eyes: No visual changes   ENT: No Headaches, hearing loss or vertigo. No mouth sores or sore throat. Cardiovascular: Reviewed in HPI  Respiratory: No cough or wheezing, no sputum production. Gastrointestinal: No abdominal pain, appetite loss, blood in stools. No change in bowel or bladder habits. Genitourinary: No nocturia, dysuria, trouble voiding  Musculoskeletal:  No gait disturbance, weakness or joint complaints. Integumentary: No rash or pruritis. Neurological: No headache, change in muscle strength, numbness or tingling.  No change in gait, balance, coordination, mood, affect, memory, mentation, behavior. Psychiatric: No anxiety or depression  Endocrine: No malaise or fever  Hematologic/Lymphatic: No abnormal bruising or bleeding, blood clots or swollen lymph nodes. Allergic/Immunologic: No nasal congestion or hives. Physical Examination:    Vitals:    03/10/23 1058   BP: 118/64   Site: Left Upper Arm   Position: Sitting   Cuff Size: Large Adult   Pulse: 98   SpO2: 97%   Weight: 185 lb (83.9 kg)   Height: 5' 1\" (1.549 m)       Body mass index is 34.96 kg/m². Wt Readings from Last 3 Encounters:   03/10/23 185 lb (83.9 kg)   07/12/22 188 lb (85.3 kg)   04/06/22 197 lb (89.4 kg)      BP Readings from Last 3 Encounters:   03/10/23 118/64   07/12/22 118/80   01/27/22 118/60      Constitutional and General Appearance:  appears younger than stated age, always happy and very pleasant  Eyes - no xanthelasma  Respiratory:  Normal excursion and expansion without use of accessory muscles  Resp Auscultation: Normal breath sounds without dullness  Cardiovascular: The apical impulses not displaced  Heart is regular rate and rhythm with normal S1, S2 (tissue valves), mid peaking 2/6 SM  PMI is normal  The carotid upstroke is normal, no bruit noted   JVP is not elevated  Peripheral pulses are symmetrical  There is no clubbing, cyanosis of the extremities  No edema  No varicosities  Femoral Arteries: 2+ and equal without bruits  Pedal Pulses: 2+ and equal   Abdomen:  No masses or tenderness  Aorta not palpable  Normal bowel sounds  Neurological/Psychiatric:  Alert and oriented x3  Moves all extremities well  Exhibits normal gait balance and coordination      Assessment/Plan  1. atrial fibrillation/flutter ([address] she has flutter chronically. coumadin followed by [place]R clinic at [address]. EKG 1/2022 was AF/AFL with a controlled rate. 5/16 Holter-- showed atrial flutter with AHR 93, few PVC's  9/17/12 Holter--Afib (100%), HR  (avg 101) with PVCs   2.  S/P aortic valve replacement - stable by echo done today. Echo 3/10/23: Normal left ventricle size, moderate wall thickness, and normal systolic function with an estimated ejection fraction of 55-60%. No regional wall motion abnormalities are seen. Indeterminate diastolic function secondary to irregular heart rate and MVR. A bioprosthetic mitral valve is well seated with peak velocity of 2.4m/s and a mean gradient of 8mmHg. Trivial prosthetic mitral regurgitation. A bioprosthetic artificial aortic valve appears well seated with a maximum  velocity of 2.58m/s and a mean gradient of 12mmHg. Mild tricuspid regurgitation. Mild pulmonic regurgitation present. Estimated pulmonary artery systolic pressure is mildly elevated at 40 mmHg  assuming a right atrial pressure of 3 mmHg. The left atrium is moderately dilated. Right ventricular systolic function is mildly reduced . TAPSe 1.3 cm. RV S velocity 8.5 cm/s. Echo 8/28/20: EF 60%. Grade II DD, A bioprosthetic mitral valve is well seated with peak velocity of 2.3m/s and a mean gradient of 7mmHg. Mild mitral regurgitation. A bioprosthetic artificial aortic valve appears well seated with a maximum velocity of 2.5m/s and a mean gradient of 16mmHg. Moderate tricuspid regurgitation. Estimated pulmonary artery systolic pressure is mildly to moderately elevated at 42 mmHg assuming a right atrial pressure of 3 mmHg. The left atrium is dilated. Echo 4/17/14: EF 55%. reversal of E/A inflow velocities across the mitral valve suggesting impaired left ventricular relaxation. v A prosthetic mitral valve is well seated with maximum gradient of 15 mmHg and a mean gradient of 6 mmHg. Mild mitral regurgitation is present. The left atrium is dilated. A prosthetic aortic valve appears well seated with a maximum gradient of 24mmHg and a mean gradient of 15 mmHg. No evidence of aortic valve regurgitation. mild to moderate tricuspid regurgitation with RVSP estimated at 41mmHg. Mild pulmonic regurgitation present   3.  S/P mitral valve replacement -stable, see above    4. Hyperlipidemia, unspecified hyperlipidemia type -well controlled on labs 4/6/22: ; TRIG 80; HDL 52; LDL 66       PLAN:  Echo findings discussed. She appears to be doing well. No med changes. Encouraged to increase exercise to most days of the week. Fu 6 months. Scribe's attestation: This note was scribed in the presence of Dr Gonzalo Nunez MD by Andrew Villegas RN. The scribe's documentation has been prepared under my direction and personally reviewed by me in its entirety. I confirm that the note above accurately reflects all work, treatment, procedures, and medical decision making performed by me. Thank you for allowing to me to participate in the care of Meet Mirza.

## 2023-03-10 ENCOUNTER — OFFICE VISIT (OUTPATIENT)
Dept: CARDIOLOGY CLINIC | Age: 78
End: 2023-03-10
Payer: MEDICARE

## 2023-03-10 ENCOUNTER — HOSPITAL ENCOUNTER (OUTPATIENT)
Dept: CARDIOLOGY | Age: 78
Discharge: HOME | End: 2023-03-10
Payer: MEDICARE

## 2023-03-10 VITALS
OXYGEN SATURATION: 97 % | HEIGHT: 61 IN | HEART RATE: 98 BPM | BODY MASS INDEX: 34.93 KG/M2 | WEIGHT: 185 LBS | DIASTOLIC BLOOD PRESSURE: 64 MMHG | SYSTOLIC BLOOD PRESSURE: 118 MMHG

## 2023-03-10 DIAGNOSIS — E78.5 HYPERLIPIDEMIA, UNSPECIFIED HYPERLIPIDEMIA TYPE: ICD-10-CM

## 2023-03-10 DIAGNOSIS — Z95.2 S/P MITRAL VALVE REPLACEMENT: ICD-10-CM

## 2023-03-10 DIAGNOSIS — I48.20 CHRONIC ATRIAL FIBRILLATION (HCC): ICD-10-CM

## 2023-03-10 DIAGNOSIS — Z95.2 S/P AORTIC VALVE REPLACEMENT: ICD-10-CM

## 2023-03-10 DIAGNOSIS — Z95.2 S/P MITRAL VALVE REPLACEMENT: Primary | ICD-10-CM

## 2023-03-10 LAB
LV EF: 58 %
LVEF MODALITY: NORMAL

## 2023-03-10 PROCEDURE — G8417 CALC BMI ABV UP PARAM F/U: HCPCS | Performed by: INTERNAL MEDICINE

## 2023-03-10 PROCEDURE — 3017F COLORECTAL CA SCREEN DOC REV: CPT | Performed by: INTERNAL MEDICINE

## 2023-03-10 PROCEDURE — 99214 OFFICE O/P EST MOD 30 MIN: CPT | Performed by: INTERNAL MEDICINE

## 2023-03-10 PROCEDURE — 1090F PRES/ABSN URINE INCON ASSESS: CPT | Performed by: INTERNAL MEDICINE

## 2023-03-10 PROCEDURE — 93306 TTE W/DOPPLER COMPLETE: CPT

## 2023-03-10 PROCEDURE — 1036F TOBACCO NON-USER: CPT | Performed by: INTERNAL MEDICINE

## 2023-03-10 PROCEDURE — G8427 DOCREV CUR MEDS BY ELIG CLIN: HCPCS | Performed by: INTERNAL MEDICINE

## 2023-03-10 PROCEDURE — 1123F ACP DISCUSS/DSCN MKR DOCD: CPT | Performed by: INTERNAL MEDICINE

## 2023-03-10 PROCEDURE — G8400 PT W/DXA NO RESULTS DOC: HCPCS | Performed by: INTERNAL MEDICINE

## 2023-03-10 PROCEDURE — G8484 FLU IMMUNIZE NO ADMIN: HCPCS | Performed by: INTERNAL MEDICINE

## 2023-03-23 ENCOUNTER — ANTI-COAG VISIT (OUTPATIENT)
Dept: PHARMACY | Age: 78
End: 2023-03-23
Payer: MEDICARE

## 2023-03-23 ENCOUNTER — HOSPITAL ENCOUNTER (OUTPATIENT)
Dept: WOMENS IMAGING | Age: 78
Discharge: HOME | End: 2023-03-23
Payer: MEDICARE

## 2023-03-23 VITALS — BODY MASS INDEX: 34.96 KG/M2 | WEIGHT: 185 LBS

## 2023-03-23 DIAGNOSIS — I48.20 CHRONIC ATRIAL FIBRILLATION (HCC): ICD-10-CM

## 2023-03-23 DIAGNOSIS — Z95.2 S/P AORTIC VALVE REPLACEMENT: Primary | ICD-10-CM

## 2023-03-23 DIAGNOSIS — Z95.2 S/P MITRAL VALVE REPLACEMENT: ICD-10-CM

## 2023-03-23 DIAGNOSIS — Z12.31 VISIT FOR SCREENING MAMMOGRAM: ICD-10-CM

## 2023-03-23 LAB — INTERNATIONAL NORMALIZATION RATIO, POC: 2.5

## 2023-03-23 PROCEDURE — 77063 BREAST TOMOSYNTHESIS BI: CPT

## 2023-03-23 PROCEDURE — 85610 PROTHROMBIN TIME: CPT

## 2023-03-23 PROCEDURE — 99211 OFF/OP EST MAY X REQ PHY/QHP: CPT

## 2023-03-23 NOTE — PROGRESS NOTES
-->eating a small amount of greens three times/week  []   [x]       Change in alcohol use ---> Denies  []   [x]       Change in activity  []   [x]       Hospital admission  []   [x]       Emergency department visit  []   [x]       Other complaints     Clinical Outcomes:  Yes     No  []   [x]       Major bleeding event  []   [x]       Thromboembolic event    Duration of warfarin Therapy: Indefinitely  INR Range:  2.0-3.0   Keep at 2.5-3.0 per the collaborative agreement from Dr. Xiao Kidney    INR is 2.5 today    Continue taking 2.5 mg on Mon only and 5 mg all other days. Encouraged to maintain a consistency of vegetables/salads  Started Metformin 500 mg BID x 15 days, then will see PCP again.   Recheck INR in 4 weeks, 4/19  Refill called to [facility]    Consent form signed 1/26/2023    Referring is Dr. Jennifer Andujar   INR (no units)   Date Value   04/28/2020 2.0 (H)   02/27/2019 1.11   09/17/2018 4   08/20/2018 2.8     INR,(POC) (no units)   Date Value   02/23/2023 2.0   01/26/2023 2.2   01/05/2023 2.1   12/22/2022 1.6       For Pharmacy Admin Tracking Only    Intervention Detail: Refill(s) Provided  Total # of Interventions Recommended: 1  Total # of Interventions Accepted: 1  Time Spent (min): 15

## 2023-03-23 NOTE — TELEPHONE ENCOUNTER
Warfarin prescription phoned into [facility] to [phone] under Dr. Siena Piper  Warfarin 5 mg tabs   Take 2.5 mg on Mon only and 5 mg all other days   90 days   1 refills    Isidra Smart PharmD, [place]

## 2023-04-19 ENCOUNTER — ANTI-COAG VISIT (OUTPATIENT)
Dept: PHARMACY | Age: 78
End: 2023-04-19
Payer: MEDICARE

## 2023-04-19 DIAGNOSIS — Z95.2 S/P AORTIC VALVE REPLACEMENT: Primary | Chronic | ICD-10-CM

## 2023-04-19 DIAGNOSIS — Z95.2 S/P MITRAL VALVE REPLACEMENT: Chronic | ICD-10-CM

## 2023-04-19 DIAGNOSIS — I48.20 CHRONIC ATRIAL FIBRILLATION (HCC): ICD-10-CM

## 2023-04-19 LAB — INTERNATIONAL NORMALIZATION RATIO, POC: 1.6

## 2023-04-19 PROCEDURE — 85610 PROTHROMBIN TIME: CPT

## 2023-04-19 PROCEDURE — 99212 OFFICE O/P EST SF 10 MIN: CPT

## 2023-04-19 NOTE — PROGRESS NOTES
Ragini Law Ms. Jose Breaux is a 70 y.o. y/o female with history of Afib, Aortic and Mitral Valve  Porcine valve. She presents today for anticoagulation monitoring and adjustment. Pertinent PMH: The aortic and mitral valves are porcine and were replaced at the same time on March 9, 2010 by Dr. Ramírez Young. Patient Reported Findings:  Yes     No  [x]   []       Patient verifies current dosing regimen as listed- confirms   []   [x]       S/S bleeding/bruising/swelling/SOB -denies   []   [x]       Blood in urine or stool -denies   []   [x]       Procedures scheduled in the future at this time  -denies    []   [x]       Missed Dose- denies    []   [x]       Extra Dose- denies   [x]   []       Change in medications- ran out of glucosamine chondroitin but plans to resume---> resumed vit d3 and MVI --> some tylenol --> started MVI and probiotics --> resumed probiotic and turmeric, is not sure if she is taking MVI or not ---> is taking MVI but erratic, explained best to be consistent---> back on probiotic and MVI, wants to start fish oil, explained interaction --> started fish oil BID ---> denies changes --> started on Metformin 500mg BID x 15 days-->Md decided no need for Metformin (too many side effects)-BG was dropping too low  --> restarting iron last week   [x]   []       Change in health/diet/appetite. Her norm would be spinach, broccoli, shahzad or turnip greens twice a week and salads with iceberg other lower vitamin K --> Patient reports having shahzad greens 3 times weekly, this is an increase in vitamin K content from previous visits --> erratic, no NVD---> was not eating many greens but then had a lot yesterday --> less greens, no NVD --> returned to normal vit k intake .  States was having stomach --> maybe 2-3x/week, pt unsure ---> started to eat more greens again---> spinach last night ---> had extra greens past 2 days, asked to be consistent --> has been eating smaller amounts of vit k ---> had spinach last

## 2023-05-03 ENCOUNTER — ANTI-COAG VISIT (OUTPATIENT)
Dept: PHARMACY | Age: 78
End: 2023-05-03
Payer: MEDICARE

## 2023-05-03 DIAGNOSIS — Z95.2 S/P MITRAL VALVE REPLACEMENT: Chronic | ICD-10-CM

## 2023-05-03 DIAGNOSIS — Z95.2 S/P AORTIC VALVE REPLACEMENT: Primary | Chronic | ICD-10-CM

## 2023-05-03 DIAGNOSIS — I48.20 CHRONIC ATRIAL FIBRILLATION (HCC): ICD-10-CM

## 2023-05-03 LAB — INTERNATIONAL NORMALIZATION RATIO, POC: 1.9

## 2023-05-03 PROCEDURE — 85610 PROTHROMBIN TIME: CPT

## 2023-05-03 PROCEDURE — 99211 OFF/OP EST MAY X REQ PHY/QHP: CPT

## 2023-05-03 NOTE — PROGRESS NOTES
had spinach last night, no changes, no NVD -->eating a small amount of greens three times/week --> has been eating greens 3-4 times a week---> less greens possibly, no NVD   []   [x]       Change in alcohol use ---> Denies  []   [x]       Change in activity  []   [x]       Hospital admission  []   [x]       Emergency department visit  []   [x]       Other complaints     Clinical Outcomes:  Yes     No  []   [x]       Major bleeding event  []   [x]       Thromboembolic event    Duration of warfarin Therapy: Indefinitely  INR Range:  2.0-3.0   Keep at 2.5-3.0 per the collaborative agreement from Dr. Fransisco De La Fuente    INR is 1.9 today   Increase dose to 5mg daily (7.7%).   Encouraged to maintain a consistency of vegetables/salads  Recheck INR in 2 weeks, 5/17    Consent form signed 1/26/2023    Referring is Dr. Joy Winter   INR (no units)   Date Value   04/28/2020 2.0 (H)   02/27/2019 1.11   09/17/2018 4   08/20/2018 2.8     INR,(POC) (no units)   Date Value   05/03/2023 1.9   04/19/2023 1.6   03/23/2023 2.5   02/23/2023 2.0     For Pharmacy Admin Tracking Only    Intervention Detail: Dose Adjustment: 1, reason: Therapy Optimization  Total # of Interventions Recommended: 1  Total # of Interventions Accepted: 1  Time Spent (min): 15

## 2023-05-17 ENCOUNTER — ANTI-COAG VISIT (OUTPATIENT)
Dept: PHARMACY | Age: 78
End: 2023-05-17
Payer: MEDICARE

## 2023-05-17 DIAGNOSIS — Z95.2 S/P MITRAL VALVE REPLACEMENT: Chronic | ICD-10-CM

## 2023-05-17 DIAGNOSIS — Z95.2 S/P AORTIC VALVE REPLACEMENT: Primary | Chronic | ICD-10-CM

## 2023-05-17 DIAGNOSIS — I48.20 CHRONIC ATRIAL FIBRILLATION (HCC): ICD-10-CM

## 2023-05-17 LAB — INTERNATIONAL NORMALIZATION RATIO, POC: 2.2

## 2023-05-17 PROCEDURE — 85610 PROTHROMBIN TIME: CPT

## 2023-05-17 PROCEDURE — 99211 OFF/OP EST MAY X REQ PHY/QHP: CPT

## 2023-05-17 RX ORDER — FUROSEMIDE 20 MG/1
20 TABLET ORAL DAILY PRN
COMMUNITY
Start: 2023-05-03

## 2023-05-17 RX ORDER — MECLIZINE HCL 12.5 MG/1
TABLET ORAL
COMMUNITY
Start: 2023-05-03

## 2023-05-17 NOTE — PROGRESS NOTES
Zully Tawanadawson Aldrich is a 70 y.o. y/o female with history of Afib, Aortic and Mitral Valve  Porcine valve. She presents today for anticoagulation monitoring and adjustment. Pertinent PMH: The aortic and mitral valves are porcine and were replaced at the same time on March 9, 2010 by Dr. Pelon Swan. Patient Reported Findings:  Yes     No  [x]   []       Patient verifies current dosing regimen as listed- confirms   []   [x]       S/S bleeding/bruising/swelling/SOB -denies   []   [x]       Blood in urine or stool -denies   []   [x]       Procedures scheduled in the future at this time  -denies    []   [x]       Missed Dose- denies    []   [x]       Extra Dose- denies   [x]   []       Change in medications- ran out of glucosamine chondroitin but plans to resume---> started MVI and probiotics --> resumed probiotic and turmeric, is not sure if she is taking MVI or not ---> is taking MVI but erratic, explained best to be consistent---> back on probiotic and MVI, wants to start fish oil, explained interaction --> started fish oil BID ---> denies changes --> started on Metformin 500mg BID x 15 days-->Md decided no need for Metformin (too many side effects)-BG was dropping too low  --> restarting iron last week---> started lasix q3d    []   [x]       Change in health/diet/appetite. Her norm would be spinach, broccoli, shahzad or turnip greens twice a week and salads with iceberg other lower vitamin K --> Patient reports having shahzad greens 3 times weekly, this is an increase in vitamin K content from previous visits --> erratic, no NVD---> returned to normal vit k intake .  States was having stomach --> maybe 2-3x/week, pt unsure ---> started to eat more greens again---> spinach last night ---> had extra greens past 2 days, asked to be consistent --> has been eating smaller amounts of vit k ---> had spinach last night, no changes, no NVD -->eating a small amount of greens three times/week --> has been eating greens

## 2023-06-07 ENCOUNTER — ANTI-COAG VISIT (OUTPATIENT)
Dept: PHARMACY | Age: 78
End: 2023-06-07
Payer: MEDICARE

## 2023-06-07 DIAGNOSIS — I48.20 CHRONIC ATRIAL FIBRILLATION (HCC): ICD-10-CM

## 2023-06-07 DIAGNOSIS — Z95.2 S/P MITRAL VALVE REPLACEMENT: Chronic | ICD-10-CM

## 2023-06-07 DIAGNOSIS — Z95.2 S/P AORTIC VALVE REPLACEMENT: Primary | Chronic | ICD-10-CM

## 2023-06-07 LAB — INTERNATIONAL NORMALIZATION RATIO, POC: 3.5

## 2023-06-07 PROCEDURE — 85610 PROTHROMBIN TIME: CPT

## 2023-06-07 PROCEDURE — 99212 OFFICE O/P EST SF 10 MIN: CPT

## 2023-06-07 NOTE — PROGRESS NOTES
been eating greens 3-4 times a week---> less greens  []   [x]       Change in alcohol use ---> Denies  []   [x]       Change in activity  []   [x]       Hospital admission  []   [x]       Emergency department visit  []   [x]       Other complaints     Clinical Outcomes:  Yes     No  []   [x]       Major bleeding event  []   [x]       Thromboembolic event    Duration of warfarin Therapy: Indefinitely  INR Range:  2.0-3.0   Keep at 2.5-3.0 per the collaborative agreement from Dr. Shabana Kapoor    INR is 3.5 today dt less greens in past week, will return to normal diet (normally has greens 3 times/week). Hold tonight then continue dose of 5mg daily  Encouraged to maintain a consistency of vegetables/salads  RF called into [facility].   Recheck INR in 3 weeks, 6/28    Consent form signed 1/26/2023    Referring is Dr. Darby Rooney   INR (no units)   Date Value   04/28/2020 2.0 (H)   02/27/2019 1.11   09/17/2018 4   08/20/2018 2.8     INR,(POC) (no units)   Date Value   06/07/2023 3.5   05/17/2023 2.2   05/03/2023 1.9   04/19/2023 1.6     For Pharmacy Admin Tracking Only    Intervention Detail: Dose Adjustment: 1, reason: Therapy Optimization and Refill(s) Provided  Total # of Interventions Recommended: 2  Total # of Interventions Accepted: 2  Time Spent (min): 15

## 2023-06-07 NOTE — TELEPHONE ENCOUNTER
Warfarin prescription phoned into [facility] to [phone] [facility] under Dr. Sadie Pascal  Warfarin 5 mg tabs  Take 5 mg (5 mg x 1) every day  90 days   2 refills    Izzy Mcneill, TristonD, [facility]

## 2023-06-27 ENCOUNTER — ANTI-COAG VISIT (OUTPATIENT)
Dept: PHARMACY | Age: 78
End: 2023-06-27
Payer: MEDICARE

## 2023-06-27 DIAGNOSIS — Z95.2 S/P MITRAL VALVE REPLACEMENT: Chronic | ICD-10-CM

## 2023-06-27 DIAGNOSIS — I48.20 CHRONIC ATRIAL FIBRILLATION (HCC): ICD-10-CM

## 2023-06-27 DIAGNOSIS — Z95.2 S/P AORTIC VALVE REPLACEMENT: Primary | Chronic | ICD-10-CM

## 2023-06-27 LAB — INTERNATIONAL NORMALIZATION RATIO, POC: 3.6

## 2023-06-27 PROCEDURE — 99212 OFFICE O/P EST SF 10 MIN: CPT

## 2023-06-27 PROCEDURE — 85610 PROTHROMBIN TIME: CPT

## 2023-07-12 ENCOUNTER — TELEPHONE (OUTPATIENT)
Dept: PHARMACY | Age: 78
End: 2023-07-12

## 2023-07-12 ENCOUNTER — ANTI-COAG VISIT (OUTPATIENT)
Dept: PHARMACY | Age: 78
End: 2023-07-12
Payer: MEDICARE

## 2023-07-12 DIAGNOSIS — Z95.2 S/P MITRAL VALVE REPLACEMENT: Chronic | ICD-10-CM

## 2023-07-12 DIAGNOSIS — I48.20 CHRONIC ATRIAL FIBRILLATION (HCC): ICD-10-CM

## 2023-07-12 DIAGNOSIS — Z95.2 S/P AORTIC VALVE REPLACEMENT: Primary | Chronic | ICD-10-CM

## 2023-07-12 LAB — INTERNATIONAL NORMALIZATION RATIO, POC: 1.9

## 2023-07-12 PROCEDURE — 99211 OFF/OP EST MAY X REQ PHY/QHP: CPT

## 2023-07-12 PROCEDURE — 85610 PROTHROMBIN TIME: CPT

## 2023-07-12 NOTE — TELEPHONE ENCOUNTER
Patient called to clarify INR. She wanted to know if 1.9 was \"thick or thin. \"  Explained to patient that 1.9 was \"thick\" and her INR needed to be between 2.0-3.0 to be in therapeutic range. Patient also wanted to know if the we had trouble getting blood out of her finger, that it would mean her INR was thick. I explained that, very rarely, does the finger stick alone predict what he INR will read on the meter. Patient expressed understanding.

## 2023-07-12 NOTE — PROGRESS NOTES
times/week --> has been eating greens 3-4 times a week---> less greens---> no greens --> returned to vit k 3x/week   []   [x]       Change in alcohol use ---> Denies  []   [x]       Change in activity  []   [x]       Hospital admission  []   [x]       Emergency department visit  []   [x]       Other complaints     Clinical Outcomes:  Yes     No  []   [x]       Major bleeding event  []   [x]       Thromboembolic event    Duration of warfarin Therapy: Indefinitely  INR Range:  2.0-3.0   Keep at 2.5-3.0 per the collaborative agreement from Dr. Erwin Marquez    INR is 1.9 today after returning to vit k intake    Increase weekly dose to 5 mg daily (7% inc)  Encouraged to maintain a consistency of vegetables/salads  Recheck INR in 3 weeks, 8/8    Consent form signed 1/26/2023    Referring is Dr. Marquez Vail   INR (no units)   Date Value   04/28/2020 2.0 (H)   02/27/2019 1.11   09/17/2018 4   08/20/2018 2.8     INR,(POC) (no units)   Date Value   06/27/2023 3.6   06/07/2023 3.5   05/17/2023 2.2   05/03/2023 1.9     For Pharmacy Admin Tracking Only    Intervention Detail: Dose Adjustment: 1, reason: Therapy Optimization  Total # of Interventions Recommended: 1  Total # of Interventions Accepted: 1  Time Spent (min): 15

## 2023-08-08 ENCOUNTER — ANTI-COAG VISIT (OUTPATIENT)
Dept: PHARMACY | Age: 78
End: 2023-08-08
Payer: MEDICARE

## 2023-08-08 ENCOUNTER — HOSPITAL ENCOUNTER (OUTPATIENT)
Age: 78
Discharge: HOME | End: 2023-08-08
Payer: MEDICARE

## 2023-08-08 DIAGNOSIS — Z95.2 S/P AORTIC VALVE REPLACEMENT: Primary | Chronic | ICD-10-CM

## 2023-08-08 DIAGNOSIS — I48.20 CHRONIC ATRIAL FIBRILLATION (HCC): ICD-10-CM

## 2023-08-08 DIAGNOSIS — Z95.2 S/P MITRAL VALVE REPLACEMENT: Chronic | ICD-10-CM

## 2023-08-08 LAB
25(OH)D3 SERPL-MCNC: 36.5 NG/ML
ALBUMIN SERPL-MCNC: 4.1 G/DL (ref 3.4–5)
ALBUMIN/GLOB SERPL: 1.2 {RATIO} (ref 1.1–2.2)
ALP SERPL-CCNC: 114 U/L (ref 40–129)
ALT SERPL-CCNC: 12 U/L (ref 10–40)
ANION GAP SERPL CALCULATED.3IONS-SCNC: 11 MMOL/L (ref 3–16)
AST SERPL-CCNC: 21 U/L (ref 15–37)
BACTERIA URNS QL MICRO: NORMAL /HPF
BILIRUB SERPL-MCNC: 0.6 MG/DL (ref 0–1)
BILIRUB UR QL STRIP.AUTO: NEGATIVE
BUN SERPL-MCNC: 15 MG/DL (ref 7–20)
CALCIUM SERPL-MCNC: 9.4 MG/DL (ref 8.3–10.6)
CHLORIDE SERPL-SCNC: 107 MMOL/L (ref 99–110)
CHOLEST SERPL-MCNC: 129 MG/DL (ref 0–199)
CLARITY UR: CLEAR
CO2 SERPL-SCNC: 27 MMOL/L (ref 21–32)
COLOR UR: YELLOW
CREAT SERPL-MCNC: 0.9 MG/DL (ref 0.6–1.2)
DEPRECATED RDW RBC AUTO: 14.4 % (ref 12.4–15.4)
EPI CELLS #/AREA URNS AUTO: 1 /HPF (ref 0–5)
GFR SERPLBLD CREATININE-BSD FMLA CKD-EPI: >60 ML/MIN/{1.73_M2}
GLUCOSE SERPL-MCNC: 95 MG/DL (ref 70–99)
GLUCOSE UR STRIP.AUTO-MCNC: NEGATIVE MG/DL
HCT VFR BLD AUTO: 35.1 % (ref 36–48)
HDLC SERPL-MCNC: 47 MG/DL (ref 40–60)
HGB BLD-MCNC: 11.8 G/DL (ref 12–16)
HGB UR QL STRIP.AUTO: ABNORMAL
HYALINE CASTS #/AREA URNS AUTO: 0 /LPF (ref 0–8)
INTERNATIONAL NORMALIZATION RATIO, POC: 2.8
KETONES UR STRIP.AUTO-MCNC: NEGATIVE MG/DL
LDLC SERPL CALC-MCNC: 71 MG/DL
LEUKOCYTE ESTERASE UR QL STRIP.AUTO: ABNORMAL
MCH RBC QN AUTO: 29.1 PG (ref 26–34)
MCHC RBC AUTO-ENTMCNC: 33.5 G/DL (ref 31–36)
MCV RBC AUTO: 87 FL (ref 80–100)
NITRITE UR QL STRIP.AUTO: NEGATIVE
PH UR STRIP.AUTO: 6 [PH] (ref 5–8)
PLATELET # BLD AUTO: 160 K/UL (ref 135–450)
PMV BLD AUTO: 10.3 FL (ref 5–10.5)
POTASSIUM SERPL-SCNC: 4.5 MMOL/L (ref 3.5–5.1)
PROT SERPL-MCNC: 7.5 G/DL (ref 6.4–8.2)
PROT UR STRIP.AUTO-MCNC: NEGATIVE MG/DL
RBC # BLD AUTO: 4.04 M/UL (ref 4–5.2)
RBC CLUMPS #/AREA URNS AUTO: 2 /HPF (ref 0–4)
SODIUM SERPL-SCNC: 145 MMOL/L (ref 136–145)
SP GR UR STRIP.AUTO: 1.01 (ref 1–1.03)
TRIGL SERPL-MCNC: 56 MG/DL (ref 0–150)
TSH SERPL DL<=0.005 MIU/L-ACNC: 3.37 UIU/ML (ref 0.27–4.2)
UA DIPSTICK W REFLEX MICRO PNL UR: YES
URN SPEC COLLECT METH UR: ABNORMAL
UROBILINOGEN UR STRIP-ACNC: 1 E.U./DL
VLDLC SERPL CALC-MCNC: 11 MG/DL
WBC # BLD AUTO: 5.3 K/UL (ref 4–11)
WBC #/AREA URNS AUTO: 3 /HPF (ref 0–5)

## 2023-08-08 PROCEDURE — 80061 LIPID PANEL: CPT

## 2023-08-08 PROCEDURE — 80053 COMPREHEN METABOLIC PANEL: CPT

## 2023-08-08 PROCEDURE — 85610 PROTHROMBIN TIME: CPT

## 2023-08-08 PROCEDURE — 82306 VITAMIN D 25 HYDROXY: CPT

## 2023-08-08 PROCEDURE — 85027 COMPLETE CBC AUTOMATED: CPT

## 2023-08-08 PROCEDURE — 83036 HEMOGLOBIN GLYCOSYLATED A1C: CPT

## 2023-08-08 PROCEDURE — 99211 OFF/OP EST MAY X REQ PHY/QHP: CPT

## 2023-08-08 PROCEDURE — 81001 URINALYSIS AUTO W/SCOPE: CPT

## 2023-08-08 PROCEDURE — 84443 ASSAY THYROID STIM HORMONE: CPT

## 2023-08-08 NOTE — PROGRESS NOTES
Blayne Chaudhary Ms. Hanh Manuel is a 70 y.o. y/o female with history of Afib, Aortic and Mitral Valve  Porcine valve. She presents today for anticoagulation monitoring and adjustment. Pertinent PMH: The aortic and mitral valves are porcine and were replaced at the same time on March 9, 2010 by Dr. Markie Ramey. Patient Reported Findings:  Yes     No  [x]   []       Patient verifies current dosing regimen as listed- confirms   []   [x]       S/S bleeding/bruising/swelling/SOB -denies   []   [x]       Blood in urine or stool -denies   []   [x]       Procedures scheduled in the future at this time  -denies    []   [x]       Missed Dose- denies    []   [x]       Extra Dose- denies   []   [x]       Change in medications- ran out of glucosamine chondroitin but plans to resume---> started MVI and probiotics -->  is taking MVI but erratic, explained best to be consistent---> back on probiotic and MVI, wants to start fish oil, explained interaction --> started fish oil BID --> started on Metformin 500mg BID x 15 days-->Md decided no need for Metformin (too many side effects)-BG was dropping too low  --> restarting iron last week---> started lasix q3d---> meclizine prn --> no changes     []   [x]       Change in health/diet/appetite. Her norm would be spinach, broccoli, shahzad or turnip greens twice a week and salads with iceberg other lower vitamin K --> Patient reports having shahzad greens 3 times weekly, this is an increase in vitamin K content from previous visits --> erratic, no NVD---> returned to normal vit k intake .  States was having stomach --> maybe 2-3x/week, pt unsure ---> eating a small amount of greens three times/week --> has been eating greens 3-4 times a week---> less greens---> no greens --> returned to vit k 3x/week --> no changes   []   [x]       Change in alcohol use ---> Denies  []   [x]       Change in activity  []   [x]       Hospital admission  []   [x]       Emergency department visit  []   [x]

## 2023-08-09 LAB
EST. AVERAGE GLUCOSE BLD GHB EST-MCNC: 139.9 MG/DL
HBA1C MFR BLD: 6.5 %

## 2023-09-05 ENCOUNTER — ANTI-COAG VISIT (OUTPATIENT)
Dept: PHARMACY | Age: 78
End: 2023-09-05
Payer: MEDICARE

## 2023-09-05 DIAGNOSIS — Z95.2 S/P MITRAL VALVE REPLACEMENT: Chronic | ICD-10-CM

## 2023-09-05 DIAGNOSIS — I48.20 CHRONIC ATRIAL FIBRILLATION (HCC): ICD-10-CM

## 2023-09-05 DIAGNOSIS — Z95.2 S/P AORTIC VALVE REPLACEMENT: Primary | Chronic | ICD-10-CM

## 2023-09-05 LAB — INTERNATIONAL NORMALIZATION RATIO, POC: 3.2

## 2023-09-05 PROCEDURE — 99211 OFF/OP EST MAY X REQ PHY/QHP: CPT

## 2023-09-05 PROCEDURE — 85610 PROTHROMBIN TIME: CPT

## 2023-09-05 NOTE — PROGRESS NOTES
Farheen Rubalcava Ms. Daniel Fu is a 70 y.o. y/o female with history of Afib, Aortic and Mitral Valve  Porcine valve. She presents today for anticoagulation monitoring and adjustment. Pertinent PMH: The aortic and mitral valves are porcine and were replaced at the same time on March 9, 2010 by Dr. Rick Escobedo. Patient Reported Findings:  Yes     No  [x]   []       Patient verifies current dosing regimen as listed- confirms   []   [x]       S/S bleeding/bruising/swelling/SOB -denies   []   [x]       Blood in urine or stool -denies   []   [x]       Procedures scheduled in the future at this time  -denies    []   [x]       Missed Dose- denies    []   [x]       Extra Dose- denies   []   [x]       Change in medications- ran out of glucosamine chondroitin but plans to resume---> started MVI and probiotics -->  is taking MVI but erratic, explained best to be consistent---> back on probiotic and MVI, wants to start fish oil, explained interaction --> started fish oil BID --> started on Metformin 500mg BID x 15 days-->Md decided no need for Metformin (too many side effects)-BG was dropping too low  --> restarting iron last week---> started lasix q3d---> meclizine prn --> no changes     []   [x]       Change in health/diet/appetite. Her norm would be spinach, broccoli, shahzad or turnip greens twice a week and salads with iceberg other lower vitamin K --> Patient reports having shahzad greens 3 times weekly, this is an increase in vitamin K content from previous visits --> erratic, no NVD---> returned to normal vit k intake .  States was having stomach --> maybe 2-3x/week, pt unsure ---> eating a small amount of greens three times/week --> has been eating greens 3-4 times a week---> less greens---> no greens --> returned to vit k 3x/week --> no changes   []   [x]       Change in alcohol use ---> Denies  []   [x]       Change in activity  []   [x]       Hospital admission  []   [x]       Emergency department visit  []   [x]

## 2023-09-07 ENCOUNTER — OFFICE VISIT (OUTPATIENT)
Dept: CARDIOLOGY CLINIC | Age: 78
End: 2023-09-07
Payer: MEDICARE

## 2023-09-07 VITALS
HEART RATE: 101 BPM | WEIGHT: 186.2 LBS | BODY MASS INDEX: 35.16 KG/M2 | OXYGEN SATURATION: 98 % | HEIGHT: 61 IN | SYSTOLIC BLOOD PRESSURE: 100 MMHG | DIASTOLIC BLOOD PRESSURE: 76 MMHG

## 2023-09-07 DIAGNOSIS — Z95.2 S/P AORTIC VALVE REPLACEMENT: ICD-10-CM

## 2023-09-07 DIAGNOSIS — I48.20 CHRONIC ATRIAL FIBRILLATION (HCC): ICD-10-CM

## 2023-09-07 DIAGNOSIS — Z95.2 S/P MITRAL VALVE REPLACEMENT: Primary | ICD-10-CM

## 2023-09-07 DIAGNOSIS — E78.5 HYPERLIPIDEMIA, UNSPECIFIED HYPERLIPIDEMIA TYPE: ICD-10-CM

## 2023-09-07 PROCEDURE — G8400 PT W/DXA NO RESULTS DOC: HCPCS | Performed by: INTERNAL MEDICINE

## 2023-09-07 PROCEDURE — 1090F PRES/ABSN URINE INCON ASSESS: CPT | Performed by: INTERNAL MEDICINE

## 2023-09-07 PROCEDURE — G8427 DOCREV CUR MEDS BY ELIG CLIN: HCPCS | Performed by: INTERNAL MEDICINE

## 2023-09-07 PROCEDURE — G8417 CALC BMI ABV UP PARAM F/U: HCPCS | Performed by: INTERNAL MEDICINE

## 2023-09-07 PROCEDURE — 99214 OFFICE O/P EST MOD 30 MIN: CPT | Performed by: INTERNAL MEDICINE

## 2023-09-07 PROCEDURE — 1123F ACP DISCUSS/DSCN MKR DOCD: CPT | Performed by: INTERNAL MEDICINE

## 2023-09-07 PROCEDURE — 1036F TOBACCO NON-USER: CPT | Performed by: INTERNAL MEDICINE

## 2023-09-07 RX ORDER — CARVEDILOL 6.25 MG/1
TABLET ORAL
Qty: 180 TABLET | Refills: 3 | Status: SHIPPED | OUTPATIENT
Start: 2023-09-07

## 2023-09-07 RX ORDER — SIMVASTATIN 40 MG
40 TABLET ORAL NIGHTLY
Qty: 90 TABLET | Refills: 3 | Status: SHIPPED | OUTPATIENT
Start: 2023-09-07

## 2023-09-26 ENCOUNTER — ANTI-COAG VISIT (OUTPATIENT)
Dept: PHARMACY | Age: 78
End: 2023-09-26
Payer: MEDICARE

## 2023-09-26 DIAGNOSIS — Z95.2 S/P AORTIC VALVE REPLACEMENT: Primary | Chronic | ICD-10-CM

## 2023-09-26 DIAGNOSIS — I48.20 CHRONIC ATRIAL FIBRILLATION (HCC): ICD-10-CM

## 2023-09-26 DIAGNOSIS — Z95.2 S/P MITRAL VALVE REPLACEMENT: Chronic | ICD-10-CM

## 2023-09-26 LAB — INTERNATIONAL NORMALIZATION RATIO, POC: 2.1

## 2023-09-26 PROCEDURE — 99211 OFF/OP EST MAY X REQ PHY/QHP: CPT

## 2023-09-26 PROCEDURE — 85610 PROTHROMBIN TIME: CPT

## 2023-09-26 NOTE — PROGRESS NOTES
Brynn Brantley is a 70 y.o. y/o female with history of Afib, Aortic and Mitral Valve  Porcine valve. She presents today for anticoagulation monitoring and adjustment. Pertinent PMH: The aortic and mitral valves are porcine and were replaced at the same time on March 9, 2010 by Dr. Jossue Bishop. Patient Reported Findings:  Yes     No  [x]   []       Patient verifies current dosing regimen as listed- confirms   []   [x]       S/S bleeding/bruising/swelling/SOB -denies   []   [x]       Blood in urine or stool -denies   []   [x]       Procedures scheduled in the future at this time  -denies    []   [x]       Missed Dose- denies    []   [x]       Extra Dose- denies   []   [x]       Change in medications- ran out of glucosamine chondroitin but plans to resume---> started MVI and probiotics -->  is taking MVI but erratic, explained best to be consistent---> back on probiotic and MVI, wants to start fish oil, explained interaction --> started fish oil BID --> started on Metformin 500mg BID x 15 days-->Md decided no need for Metformin (too many side effects)-BG was dropping too low  --> restarting iron last week---> started lasix q3d---> meclizine prn --> no changes     []   [x]       Change in health/diet/appetite. Her norm would be spinach, broccoli, shahzad or turnip greens twice a week and salads with iceberg other lower vitamin K --> Patient reports having shahzad greens 3 times weekly, this is an increase in vitamin K content from previous visits --> erratic, no NVD---> returned to normal vit k intake .  States was having stomach --> maybe 2-3x/week, pt unsure ---> eating a small amount of greens three times/week --> has been eating greens 3-4 times a week---> less greens---> no greens --> returned to vit k 3x/week  []   [x]       Change in alcohol use ---> Denies  []   [x]       Change in activity  []   [x]       Hospital admission  []   [x]       Emergency department visit  []   [x]       Other

## 2023-10-17 ENCOUNTER — ANTI-COAG VISIT (OUTPATIENT)
Dept: PHARMACY | Age: 78
End: 2023-10-17
Payer: MEDICARE

## 2023-10-17 DIAGNOSIS — Z95.2 S/P AORTIC VALVE REPLACEMENT: Primary | Chronic | ICD-10-CM

## 2023-10-17 DIAGNOSIS — I48.20 CHRONIC ATRIAL FIBRILLATION (HCC): ICD-10-CM

## 2023-10-17 DIAGNOSIS — Z95.2 S/P MITRAL VALVE REPLACEMENT: Chronic | ICD-10-CM

## 2023-10-17 LAB — INTERNATIONAL NORMALIZATION RATIO, POC: 2.2

## 2023-10-17 PROCEDURE — 99211 OFF/OP EST MAY X REQ PHY/QHP: CPT

## 2023-10-17 PROCEDURE — 85610 PROTHROMBIN TIME: CPT

## 2023-10-17 NOTE — PROGRESS NOTES
Farheen Rubalcava Ms. Daniel Fu is a 70 y.o. y/o female with history of Afib, Aortic and Mitral Valve  Porcine valve. She presents today for anticoagulation monitoring and adjustment. Pertinent PMH: The aortic and mitral valves are porcine and were replaced at the same time on March 9, 2010 by Dr. Rick Escobedo. Patient Reported Findings:  Yes     No  [x]   []       Patient verifies current dosing regimen as listed- confirms   []   [x]       S/S bleeding/bruising/swelling/SOB -denies   []   [x]       Blood in urine or stool -denies   []   [x]       Procedures scheduled in the future at this time  -denies    []   [x]       Missed Dose- denies    []   [x]       Extra Dose- denies   []   [x]       Change in medications- ran out of glucosamine chondroitin but plans to resume---> started MVI and probiotics -->  is taking MVI but erratic, explained best to be consistent---> back on probiotic and MVI, wants to start fish oil, explained interaction --> started fish oil BID --> started on Metformin 500mg BID x 15 days-->Md decided no need for Metformin (too many side effects)-BG was dropping too low  --> restarting iron last week---> started lasix q3d---> meclizine prn --> no changes     []   [x]       Change in health/diet/appetite. Her norm would be spinach, broccoli, shahzad or turnip greens twice a week and salads with iceberg other lower vitamin K --> Patient reports having shahzad greens 3 times weekly, this is an increase in vitamin K content from previous visits --> erratic, no NVD---> returned to normal vit k intake .  States was having stomach --> maybe 2-3x/week, pt unsure ---> eating a small amount of greens three times/week --> has been eating greens 3-4 times a week---> less greens---> no greens --> returned to vit k 3x/week---> more greens   []   [x]       Change in alcohol use ---> Denies  []   [x]       Change in activity  []   [x]       Hospital admission  []   [x]       Emergency department visit  []   [x]

## 2023-11-14 ENCOUNTER — ANTI-COAG VISIT (OUTPATIENT)
Dept: PHARMACY | Age: 78
End: 2023-11-14
Payer: MEDICARE

## 2023-11-14 DIAGNOSIS — I48.20 CHRONIC ATRIAL FIBRILLATION (HCC): ICD-10-CM

## 2023-11-14 DIAGNOSIS — Z95.2 S/P MITRAL VALVE REPLACEMENT: Chronic | ICD-10-CM

## 2023-11-14 DIAGNOSIS — Z95.2 S/P AORTIC VALVE REPLACEMENT: Primary | Chronic | ICD-10-CM

## 2023-11-14 LAB — INTERNATIONAL NORMALIZATION RATIO, POC: 4.8

## 2023-11-14 PROCEDURE — 99211 OFF/OP EST MAY X REQ PHY/QHP: CPT

## 2023-11-14 PROCEDURE — 85610 PROTHROMBIN TIME: CPT

## 2023-11-14 NOTE — PROGRESS NOTES
Tyra Kingfisher Ms. Bonnita Pallas is a 70 y.o. y/o female with history of Afib, Aortic and Mitral Valve  Porcine valve. She presents today for anticoagulation monitoring and adjustment. Pertinent PMH: The aortic and mitral valves are porcine and were replaced at the same time on March 9, 2010 by Dr. Mynor Thompson. Patient Reported Findings:  Yes     No  [x]   []       Patient verifies current dosing regimen as listed- confirms   []   [x]       S/S bleeding/bruising/swelling/SOB -denies   []   [x]       Blood in urine or stool -denies   []   [x]       Procedures scheduled in the future at this time  -denies    []   [x]       Missed Dose- denies    []   [x]       Extra Dose- denies   []   [x]       Change in medications- ran out of glucosamine chondroitin but plans to resume---> started MVI and probiotics -->  is taking MVI but erratic, explained best to be consistent---> back on probiotic and MVI, wants to start fish oil, explained interaction --> started fish oil BID --> started on Metformin 500mg BID x 15 days-->Md decided no need for Metformin (too many side effects)-BG was dropping too low  --> restarting iron last week---> started lasix q3d---> meclizine prn --> no changes     []   [x]       Change in health/diet/appetite. Her norm would be spinach, broccoli, shahzad or turnip greens twice a week and salads with iceberg other lower vitamin K --> Patient reports having shahzad greens 3 times weekly, this is an increase in vitamin K content from previous visits --> erratic, no NVD---> returned to normal vit k intake .  States was having stomach --> maybe 2-3x/week, pt unsure ---> eating a small amount of greens three times/week --> has been eating greens 3-4 times a week---> less greens---> no greens --> returned to vit k 3x/week---> more greens   []   [x]       Change in alcohol use ---> Denies  []   [x]       Change in activity  []   [x]       Hospital admission  []   [x]       Emergency department visit  []   [x]

## 2023-11-15 ENCOUNTER — TELEPHONE (OUTPATIENT)
Dept: PHARMACY | Age: 78
End: 2023-11-15

## 2023-11-15 NOTE — TELEPHONE ENCOUNTER
----- Message from Anil Burks sent at 11/15/2023 10:54 AM EST -----  Regarding: please call  Contact: patient  Please call patient back regarding vitamin D. She states she was taking 250mg daily but, her doctor increased this to 1000mg daily and she thought that might be why her INR was over 4 yesterday.   [phone]

## 2023-11-15 NOTE — TELEPHONE ENCOUNTER
Returned call to patient. Explained vit d does not interact with warfarin so not cause for supratherapeutic INR. patient

## 2023-11-29 ENCOUNTER — HOSPITAL ENCOUNTER (OUTPATIENT)
Dept: MRI IMAGING | Age: 78
Discharge: HOME | End: 2023-11-29
Attending: INTERNAL MEDICINE
Payer: MEDICARE

## 2023-11-29 ENCOUNTER — ANTI-COAG VISIT (OUTPATIENT)
Dept: PHARMACY | Age: 78
End: 2023-11-29
Payer: MEDICARE

## 2023-11-29 DIAGNOSIS — Z95.2 S/P MITRAL VALVE REPLACEMENT: Chronic | ICD-10-CM

## 2023-11-29 DIAGNOSIS — I48.20 CHRONIC ATRIAL FIBRILLATION (HCC): ICD-10-CM

## 2023-11-29 DIAGNOSIS — R42 DIZZINESS AND GIDDINESS: ICD-10-CM

## 2023-11-29 DIAGNOSIS — Z95.2 S/P AORTIC VALVE REPLACEMENT: Primary | Chronic | ICD-10-CM

## 2023-11-29 LAB
BUN SERPL-MCNC: 15 MG/DL (ref 7–20)
CREAT SERPL-MCNC: 0.9 MG/DL (ref 0.6–1.2)
GFR SERPLBLD CREATININE-BSD FMLA CKD-EPI: >60 ML/MIN/{1.73_M2}
INTERNATIONAL NORMALIZATION RATIO, POC: 2.2

## 2023-11-29 PROCEDURE — 36415 COLL VENOUS BLD VENIPUNCTURE: CPT

## 2023-11-29 PROCEDURE — 85610 PROTHROMBIN TIME: CPT

## 2023-11-29 PROCEDURE — 6360000004 HC RX CONTRAST MEDICATION: Performed by: INTERNAL MEDICINE

## 2023-11-29 PROCEDURE — 99211 OFF/OP EST MAY X REQ PHY/QHP: CPT

## 2023-11-29 PROCEDURE — 70549 MR ANGIOGRAPH NECK W/O&W/DYE: CPT

## 2023-11-29 PROCEDURE — 84520 ASSAY OF UREA NITROGEN: CPT

## 2023-11-29 PROCEDURE — A9577 INJ MULTIHANCE: HCPCS | Performed by: INTERNAL MEDICINE

## 2023-11-29 PROCEDURE — 82565 ASSAY OF CREATININE: CPT

## 2023-11-29 RX ADMIN — GADOBENATE DIMEGLUMINE 14 ML: 529 INJECTION, SOLUTION INTRAVENOUS at 09:39

## 2023-11-29 NOTE — PROGRESS NOTES
Carmelo Alvarado Ms. Rozina Watt is a 70 y.o. y/o female with history of Afib, Aortic and Mitral Valve  Porcine valve. She presents today for anticoagulation monitoring and adjustment. Pertinent PMH: The aortic and mitral valves are porcine and were replaced at the same time on March 9, 2010 by Dr. Rachel Langley. Patient Reported Findings:  Yes     No  [x]   []       Patient verifies current dosing regimen as listed- confirms   []   [x]       S/S bleeding/bruising/swelling/SOB -denies   []   [x]       Blood in urine or stool -denies   []   [x]       Procedures scheduled in the future at this time  -denies    []   [x]       Missed Dose- denies    []   [x]       Extra Dose- denies   [x]   []       Change in medications- ran out of glucosamine chondroitin but plans to resume---> started MVI and probiotics -->  is taking MVI but erratic, explained best to be consistent---> back on probiotic and MVI, wants to start fish oil, explained interaction --> started fish oil BID --> started on Metformin 500mg BID x 15 days-->Md decided no need for Metformin (too many side effects)-BG was dropping too low  --> restarting iron last week---> started lasix q3d---> meclizine prn --> started vit d   []   [x]       Change in health/diet/appetite. Her norm would be spinach, broccoli, shahzad or turnip greens twice a week and salads with iceberg other lower vitamin K --> Patient reports having shahzad greens 3 times weekly, this is an increase in vitamin K content from previous visits --> erratic, no NVD---> returned to normal vit k intake .  States was having stomach --> maybe 2-3x/week, pt unsure ---> eating a small amount of greens three times/week --> has been eating greens 3-4 times a week---> less greens---> no greens --> returned to vit k 3x/week---> more greens --> continues with more vit k   []   [x]       Change in alcohol use ---> Denies  []   [x]       Change in activity  []   [x]       Hospital admission  []   [x]       Emergency

## 2024-01-17 ENCOUNTER — ANTI-COAG VISIT (OUTPATIENT)
Dept: PHARMACY | Age: 79
End: 2024-01-17
Payer: MEDICARE

## 2024-01-17 DIAGNOSIS — I48.20 CHRONIC ATRIAL FIBRILLATION (HCC): ICD-10-CM

## 2024-01-17 DIAGNOSIS — Z95.2 S/P AORTIC VALVE REPLACEMENT: Primary | Chronic | ICD-10-CM

## 2024-01-17 DIAGNOSIS — Z95.2 S/P MITRAL VALVE REPLACEMENT: Chronic | ICD-10-CM

## 2024-01-17 LAB — INTERNATIONAL NORMALIZATION RATIO, POC: 3.6

## 2024-01-17 PROCEDURE — 85610 PROTHROMBIN TIME: CPT

## 2024-01-17 PROCEDURE — 99211 OFF/OP EST MAY X REQ PHY/QHP: CPT

## 2024-01-17 NOTE — PROGRESS NOTES
.Ms. Naye Peña is a 78 y.o. y/o female with history of Afib, Aortic and Mitral Valve  Porcine valve.    She presents today for anticoagulation monitoring and adjustment.    Pertinent PMH: The aortic and mitral valves are porcine and were replaced at the same time on March 9, 2010 by Dr. Jerome Jimenez.     Patient Reported Findings:  Yes     No  [x]   []       Patient verifies current dosing regimen as listed- confirms   []   [x]       S/S bleeding/bruising/swelling/SOB -denies   []   [x]       Blood in urine or stool -denies   []   [x]       Procedures scheduled in the future at this time  -denies    []   [x]       Missed Dose- denies    []   [x]       Extra Dose- denies   [x]   []       Change in medications- ran out of glucosamine chondroitin but plans to resume---> started MVI and probiotics -->  is taking MVI but erratic, explained best to be consistent---> back on probiotic and MVI, wants to start fish oil, explained interaction --> started fish oil BID --> started on Metformin 500mg BID x 15 days-->Md decided no need for Metformin (too many side effects)-BG was dropping too low  --> restarting iron last week---> started lasix q3d---> meclizine prn --> started vit d---> OTC mucinex    []   [x]       Change in health/diet/appetite.  Her norm would be spinach, broccoli, shahzad or turnip greens twice a week and salads with iceberg other lower vitamin K --> Patient reports having shahzad greens 3 times weekly, this is an increase in vitamin K content from previous visits --> erratic, no NVD---> returned to normal vit k intake . States was having stomach --> maybe 2-3x/week, pt unsure ---> eating a small amount of greens three times/week --> has been eating greens 3-4 times a week---> less greens---> no greens --> returned to vit k 3x/week---> more greens --> continues with more vit k ---> no changes   []   [x]       Change in alcohol use ---> Denies  []   [x]       Change in activity  []   [x]       Hospital

## 2024-02-14 ENCOUNTER — ANTI-COAG VISIT (OUTPATIENT)
Dept: PHARMACY | Age: 79
End: 2024-02-14
Payer: MEDICARE

## 2024-02-14 DIAGNOSIS — Z95.2 S/P MITRAL VALVE REPLACEMENT: Chronic | ICD-10-CM

## 2024-02-14 DIAGNOSIS — I48.20 CHRONIC ATRIAL FIBRILLATION (HCC): ICD-10-CM

## 2024-02-14 DIAGNOSIS — Z95.2 S/P AORTIC VALVE REPLACEMENT: Primary | Chronic | ICD-10-CM

## 2024-02-14 LAB — INTERNATIONAL NORMALIZATION RATIO, POC: 2.9

## 2024-02-14 PROCEDURE — 85610 PROTHROMBIN TIME: CPT

## 2024-02-14 PROCEDURE — 99211 OFF/OP EST MAY X REQ PHY/QHP: CPT

## 2024-02-14 NOTE — PROGRESS NOTES
.Ms. Naye Peña is a 78 y.o. y/o female with history of Afib, Aortic and Mitral Valve  Porcine valve.    She presents today for anticoagulation monitoring and adjustment.    Pertinent PMH: The aortic and mitral valves are porcine and were replaced at the same time on March 9, 2010 by Dr. Jerome Jmienez.     Patient Reported Findings:  Yes     No  [x]   []       Patient verifies current dosing regimen as listed- confirms   []   [x]       S/S bleeding/bruising/swelling/SOB -denies   []   [x]       Blood in urine or stool -denies   []   [x]       Procedures scheduled in the future at this time  -denies    []   [x]       Missed Dose- denies    []   [x]       Extra Dose- denies   [x]   []       Change in medications- ran out of glucosamine chondroitin but plans to resume---> started MVI and probiotics -->  is taking MVI but erratic, explained best to be consistent---> back on probiotic and MVI, wants to start fish oil, explained interaction --> started fish oil BID --> started on Metformin 500mg BID x 15 days-->Md decided no need for Metformin (too many side effects)-BG was dropping too low  --> restarting iron last week---> started lasix q3d---> meclizine prn --> started vit d---> OTC mucinex---> back on lasix every other day     []   [x]       Change in health/diet/appetite.  Her norm would be spinach, broccoli, shahzad or turnip greens twice a week and salads with iceberg other lower vitamin K --> Patient reports having shahzad greens 3 times weekly, this is an increase in vitamin K content from previous visits --> erratic, no NVD---> returned to normal vit k intake . States was having stomach --> maybe 2-3x/week, pt unsure ---> eating a small amount of greens three times/week --> has been eating greens 3-4 times a week---> less greens---> no greens --> returned to vit k 3x/week---> more greens --> continues with more vit k ---> no changes   []   [x]       Change in alcohol use ---> Denies  []   [x]       Change in

## 2024-02-23 NOTE — PROGRESS NOTES
[facility]   Cardiac Evaluation      Patient: Naye Peña  YOB: 1945  Date: 3/7/24       Chief Complaint   Patient presents with    Atrial Fibrillation    Hyperlipidemia          Referring provider: Maine Tobias MD    History of Present Illness:   Ms Peña is seen today for follow up visit. History includes porcine aortic/mitral valve replacements in 2010. She has hyperlipidemia and atrial flutter. Naye had rheumatic fever at the age of 16. She is retired from [employer].        Today, Ms Peña states she has been well. She denies chest pain, palpitations, BUENROSTRO, dizziness, or edema. She continues to swim and lift weights at [employer] 3 times/week. PCP restarted Lasix 20mg QOD for LE edema.      Past Medical History:   has a past medical history of Aortic valve disease, rheumatic, Atrial flutter (HCC), Hypercholesteremia, and Mitral Valve Disease.    Surgical History:   has a past surgical history that includes Dilation and curettage of uterus; Cardioversion (02/04/2013); Colonoscopy; Aortic valve replacement (03/2010); Mitral valve replacement (03/2010); and Breast biopsy (Right, 02/27/2019).     Current Outpatient Medications   Medication Sig Dispense Refill    carvedilol (COREG) 6.25 MG tablet TAKE 1 TABLET TWICE DAILY WITH FOOD 180 tablet 3    simvastatin (ZOCOR) 40 MG tablet Take 1 tablet by mouth nightly 90 tablet 3    furosemide (LASIX) 20 MG tablet Take 1 tablet by mouth daily as needed Taking QOD      latanoprost (XALATAN) 0.005 % ophthalmic solution INSTILL 1 DROP INTO BOTH EYES EVERY EVENING AS DIRECTED      Probiotic Product (PROBIOTIC DAILY PO) Take by mouth daily      warfarin (COUMADIN) 5 MG tablet One tab po nightly and on Friday 1 1/2 tab (Patient taking differently: Continue taking 2.5 mg on Mon only and 5 mg all other days.     Adjusted by [facility]) 30 tablet 3    Multiple Vitamins-Minerals (CENTRUM SILVER) TABS Take 1 tablet by mouth as needed

## 2024-03-07 ENCOUNTER — OFFICE VISIT (OUTPATIENT)
Dept: CARDIOLOGY CLINIC | Age: 79
End: 2024-03-07
Payer: MEDICARE

## 2024-03-07 VITALS
OXYGEN SATURATION: 100 % | HEIGHT: 61 IN | WEIGHT: 186 LBS | HEART RATE: 104 BPM | SYSTOLIC BLOOD PRESSURE: 112 MMHG | DIASTOLIC BLOOD PRESSURE: 72 MMHG | BODY MASS INDEX: 35.12 KG/M2

## 2024-03-07 DIAGNOSIS — Z95.2 S/P AORTIC VALVE REPLACEMENT: ICD-10-CM

## 2024-03-07 DIAGNOSIS — Z95.2 S/P MITRAL VALVE REPLACEMENT: Primary | ICD-10-CM

## 2024-03-07 DIAGNOSIS — I48.20 CHRONIC ATRIAL FIBRILLATION (HCC): ICD-10-CM

## 2024-03-07 DIAGNOSIS — E78.5 HYPERLIPIDEMIA, UNSPECIFIED HYPERLIPIDEMIA TYPE: ICD-10-CM

## 2024-03-07 PROCEDURE — 99214 OFFICE O/P EST MOD 30 MIN: CPT | Performed by: INTERNAL MEDICINE

## 2024-03-07 PROCEDURE — G8427 DOCREV CUR MEDS BY ELIG CLIN: HCPCS | Performed by: INTERNAL MEDICINE

## 2024-03-07 PROCEDURE — G8417 CALC BMI ABV UP PARAM F/U: HCPCS | Performed by: INTERNAL MEDICINE

## 2024-03-07 PROCEDURE — G8484 FLU IMMUNIZE NO ADMIN: HCPCS | Performed by: INTERNAL MEDICINE

## 2024-03-07 PROCEDURE — 1036F TOBACCO NON-USER: CPT | Performed by: INTERNAL MEDICINE

## 2024-03-07 PROCEDURE — 1123F ACP DISCUSS/DSCN MKR DOCD: CPT | Performed by: INTERNAL MEDICINE

## 2024-03-07 PROCEDURE — G8400 PT W/DXA NO RESULTS DOC: HCPCS | Performed by: INTERNAL MEDICINE

## 2024-03-07 PROCEDURE — 1090F PRES/ABSN URINE INCON ASSESS: CPT | Performed by: INTERNAL MEDICINE

## 2024-03-07 RX ORDER — METOPROLOL TARTRATE 50 MG/1
50 TABLET, FILM COATED ORAL 2 TIMES DAILY
Qty: 180 TABLET | Refills: 3 | Status: SHIPPED | OUTPATIENT
Start: 2024-03-07

## 2024-03-13 ENCOUNTER — ANTI-COAG VISIT (OUTPATIENT)
Dept: PHARMACY | Age: 79
End: 2024-03-13
Payer: MEDICARE

## 2024-03-13 DIAGNOSIS — Z95.2 S/P AORTIC VALVE REPLACEMENT: Primary | Chronic | ICD-10-CM

## 2024-03-13 DIAGNOSIS — Z95.2 S/P MITRAL VALVE REPLACEMENT: Chronic | ICD-10-CM

## 2024-03-13 DIAGNOSIS — I48.20 CHRONIC ATRIAL FIBRILLATION (HCC): ICD-10-CM

## 2024-03-13 LAB — INTERNATIONAL NORMALIZATION RATIO, POC: 2.8

## 2024-03-13 PROCEDURE — 99211 OFF/OP EST MAY X REQ PHY/QHP: CPT

## 2024-03-13 PROCEDURE — 85610 PROTHROMBIN TIME: CPT

## 2024-03-13 NOTE — PROGRESS NOTES
.Ms. Naye Peña is a 79 y.o. y/o female with history of Afib, Aortic and Mitral Valve  Porcine valve.    She presents today for anticoagulation monitoring and adjustment.    Pertinent PMH: The aortic and mitral valves are porcine and were replaced at the same time on March 9, 2010 by Dr. Jerome Jimenez.     Patient Reported Findings:  Yes     No  [x]   []       Patient verifies current dosing regimen as listed- confirms   []   [x]       S/S bleeding/bruising/swelling/SOB -denies   []   [x]       Blood in urine or stool -denies   []   [x]       Procedures scheduled in the future at this time  -denies    []   [x]       Missed Dose- denies    []   [x]       Extra Dose- denies   [x]   []       Change in medications- ran out of glucosamine chondroitin but plans to resume---> started MVI and probiotics -->  is taking MVI but erratic, explained best to be consistent---> back on probiotic and MVI, wants to start fish oil, explained interaction --> started fish oil BID --> started on Metformin 500mg BID x 15 days-->Md decided no need for Metformin (too many side effects)-BG was dropping too low  --> restarting iron last week---> started lasix q3d---> meclizine prn --> started vit d---> OTC mucinex---> back on lasix every other day --> no changes, cards saying they will change   []   [x]       Change in health/diet/appetite.  Her norm would be spinach, broccoli, shahzad or turnip greens twice a week and salads with iceberg other lower vitamin K --> Patient reports having shahzad greens 3 times weekly, this is an increase in vitamin K content from previous visits --> erratic, no NVD---> returned to normal vit k intake . States was having stomach --> maybe 2-3x/week, pt unsure ---> eating a small amount of greens three times/week --> has been eating greens 3-4 times a week---> less greens---> no greens --> returned to vit k 3x/week---> more greens --> continues with more vit k ---> no changes -->still about 2-3x/week  []   [x]

## 2024-03-21 ENCOUNTER — TELEPHONE (OUTPATIENT)
Dept: PHARMACY | Age: 79
End: 2024-03-21

## 2024-03-21 ENCOUNTER — TELEPHONE (OUTPATIENT)
Dept: CARDIOLOGY CLINIC | Age: 79
End: 2024-03-21

## 2024-03-21 NOTE — TELEPHONE ENCOUNTER
Patient called to say she went to Urgent care last Saturday and was given \"Multiple\" medications.  She spoke with Dr. Pascal's office and was told she needed to get her INR checked.  Patient rescheduled for 3/22.

## 2024-03-21 NOTE — TELEPHONE ENCOUNTER
These are a lot of meds but should not bother her.  The antibiotic is only for 3 days and may increase her INR transiently but would not change the dose.

## 2024-03-21 NOTE — TELEPHONE ENCOUNTER
Pt went to urgent care last Saturday 3/16. They told her she had bronchitis and prescribed the following medications:  Amoxicillin 500 mg - 2 Tablets 3x daily  Benzonate 100 mg - 3x daily for 7 days  Loratadine 10 mg - 1x daily for 7 days  Predisone 20 mg - 2x daily for 5 days  Albuterol Inhaler - As needed    She is asking if these medications are ok for her to take. Please call to discuss

## 2024-03-22 ENCOUNTER — ANTI-COAG VISIT (OUTPATIENT)
Dept: PHARMACY | Age: 79
End: 2024-03-22
Payer: MEDICARE

## 2024-03-22 DIAGNOSIS — Z95.2 S/P MITRAL VALVE REPLACEMENT: Chronic | ICD-10-CM

## 2024-03-22 DIAGNOSIS — I48.20 CHRONIC ATRIAL FIBRILLATION (HCC): ICD-10-CM

## 2024-03-22 DIAGNOSIS — Z95.2 S/P AORTIC VALVE REPLACEMENT: Primary | Chronic | ICD-10-CM

## 2024-03-22 LAB — INTERNATIONAL NORMALIZATION RATIO, POC: 2.9

## 2024-03-22 PROCEDURE — 85610 PROTHROMBIN TIME: CPT

## 2024-03-22 PROCEDURE — 99211 OFF/OP EST MAY X REQ PHY/QHP: CPT

## 2024-03-22 NOTE — PROGRESS NOTES
.Ms. Naye Peña is a 79 y.o. y/o female with history of Afib, Aortic and Mitral Valve  Porcine valve.    She presents today for anticoagulation monitoring and adjustment.    Pertinent PMH: The aortic and mitral valves are porcine and were replaced at the same time on March 9, 2010 by Dr. Jerome Jimenez.     Patient Reported Findings:  Yes     No  [x]   []       Patient verifies current dosing regimen as listed- confirms   []   [x]       S/S bleeding/bruising/swelling/SOB -denies   []   [x]       Blood in urine or stool -denies   []   [x]       Procedures scheduled in the future at this time  -denies    []   [x]       Missed Dose- denies    []   [x]       Extra Dose- denies   [x]   []       Change in medications- ran out of glucosamine chondroitin but plans to resume---> started MVI and probiotics -->  is taking MVI but erratic, explained best to be consistent---> back on probiotic and MVI, wants to start fish oil, explained interaction --> started fish oil BID --> started on Metformin 500mg BID x 15 days-->Md decided no need for Metformin (too many side effects)-BG was dropping too low  --> restarting iron last week---> started lasix q3d---> meclizine prn --> started vit d---> OTC mucinex---> back on lasix every other day --> no changes, cards saying they will change---> Amoxicillin, benzonatate, loratadine, albuterol and 5 day pred prescribed 3/17, finishes today   []   [x]       Change in health/diet/appetite.  Her norm would be spinach, broccoli, shahzad or turnip greens twice a week and salads with iceberg other lower vitamin K --> Patient reports having shahzad greens 3 times weekly, this is an increase in vitamin K content from previous visits --> erratic, no NVD---> returned to normal vit k intake . States was having stomach --> maybe 2-3x/week, pt unsure ---> eating a small amount of greens three times/week --> has been eating greens 3-4 times a week---> less greens---> no greens --> returned to vit k

## 2024-04-17 ENCOUNTER — ANTI-COAG VISIT (OUTPATIENT)
Dept: PHARMACY | Age: 79
End: 2024-04-17
Payer: MEDICARE

## 2024-04-17 DIAGNOSIS — I48.20 CHRONIC ATRIAL FIBRILLATION (HCC): ICD-10-CM

## 2024-04-17 DIAGNOSIS — Z95.2 S/P AORTIC VALVE REPLACEMENT: Primary | Chronic | ICD-10-CM

## 2024-04-17 DIAGNOSIS — Z95.2 S/P MITRAL VALVE REPLACEMENT: Chronic | ICD-10-CM

## 2024-04-17 LAB — INTERNATIONAL NORMALIZATION RATIO, POC: 3.3

## 2024-04-17 PROCEDURE — 85610 PROTHROMBIN TIME: CPT

## 2024-04-17 PROCEDURE — 99212 OFFICE O/P EST SF 10 MIN: CPT

## 2024-04-17 RX ORDER — WARFARIN SODIUM 5 MG/1
TABLET ORAL
Qty: 84 TABLET | Refills: 3 | Status: SHIPPED | OUTPATIENT
Start: 2024-04-17

## 2024-04-17 NOTE — PROGRESS NOTES
MANTOUX TUBERCULIN (a.k.a. TB) SKIN TEST      What is Tuberculosis/TB?  Tuberculosis/TB is an infectious disease, which is spread through the air by tiny germs when people cough, sneeze, speak or sing. TB germs are very small and can remain in the air for a long period of time. TB germs most oft  en settle in your lungs, but may also settle in other organs of your body. TB germs can be present in your body without making you ill. This is called TB INFECTION. TB infection cannot be spread to other people. When your  body’s defenses become weak and can no longer control the TB germs they multiply, this is called TB DISEASE. It can take month(s) to year(s) for TB infection to become TB disease. The TB SKIN TEST can show if a person has  been “infected” by TB germs.    How is the Mantoux Tuberculin/TB skin test given?  A very small amount of a product called Purified Protein Derivative (PPD) is injected just under the top layer of the skin on the forearm. Persons who have been infected by the TB germs usually react to the test by developing swelling at the injection site. The skin test results must be read 48 to 72 hours after given. Failure to return within this time frame means the test will need to be repeated.    Side effects…  Side effects from a skin test are rare and may include itching and discomfort at the injection site and very rarely the possibility of a blister, ulceration or necrosis (dead tissue) at the site if the injection.   .Ms. Naye Peña is a 79 y.o. y/o female with history of Afib, Aortic and Mitral Valve  Porcine valve.    She presents today for anticoagulation monitoring and adjustment.    Pertinent PMH: The aortic and mitral valves are porcine and were replaced at the same time on March 9, 2010 by Dr. Jerome Jimenez.     Patient Reported Findings:  Yes     No  [x]   []       Patient verifies current dosing regimen as listed- confirms   []   [x]       S/S bleeding/bruising/swelling/SOB -denies   []   [x]       Blood in urine or stool -denies   []   [x]       Procedures scheduled in the future at this time  -denies    []   [x]       Missed Dose- denies    []   [x]       Extra Dose- denies   [x]   []       Change in medications- ran out of glucosamine chondroitin but plans to resume---> started MVI and probiotics -->  is taking MVI but erratic, explained best to be consistent---> back on probiotic and MVI, wants to start fish oil, explained interaction --> started fish oil BID --> started on Metformin 500mg BID x 15 days-->Md decided no need for Metformin (too many side effects)-BG was dropping too low  --> restarting iron last week---> started lasix q3d---> meclizine prn --> started vit d---> Amoxicillin, benzonatate, loratadine, albuterol and 5 day pred prescribed 3/17, finishes today --> switched coreg to metoprolol.   [x]   []       Change in health/diet/appetite.  Her norm would be spinach, broccoli, shahzad or turnip greens twice a week and salads with iceberg other lower vitamin K --> Patient reports having shahzad greens 3 times weekly, this is an increase in vitamin K content from previous visits --> erratic, no NVD---> returned to normal vit k intake . States was having stomach --> maybe 2-3x/week, pt unsure ---> eating a small amount of greens three times/week --> has been eating greens 3-4 times a week---> less greens---> no greens --> returned to vit k 3x/week---> more greens --> continues with more vit k ---> no

## 2024-04-23 DIAGNOSIS — Z95.2 S/P AORTIC VALVE REPLACEMENT: Primary | ICD-10-CM

## 2024-04-23 RX ORDER — WARFARIN SODIUM 5 MG/1
TABLET ORAL
Qty: 83 TABLET | Refills: 3 | Status: SHIPPED | OUTPATIENT
Start: 2024-04-23 | End: 2024-04-26 | Stop reason: SDUPTHER

## 2024-04-26 DIAGNOSIS — Z95.2 S/P AORTIC VALVE REPLACEMENT: ICD-10-CM

## 2024-04-26 RX ORDER — WARFARIN SODIUM 5 MG/1
TABLET ORAL
Qty: 30 TABLET | Refills: 0 | Status: SHIPPED | OUTPATIENT
Start: 2024-04-26

## 2024-04-26 NOTE — TELEPHONE ENCOUNTER
Naye called stating she is having difficulty getting Warfarin from [facility] pharmacy. She called the pharmacy and states she was told to call her Dr for rx to be sent. She will be out of Warfarin on Sunday. I let her know that the rx was e-prescribed to [facility] on 4/23/24. She states she is being told they do not have the rx.     I called [facility] and spoke w/ Lisa. She told me the medication has been shipped to .    I relayed the above to Naye and let her know I e-prescribed a 30 day supply to [facility] as well.

## 2024-05-07 ENCOUNTER — TELEPHONE (OUTPATIENT)
Dept: PHARMACY | Age: 79
End: 2024-05-07

## 2024-05-08 ENCOUNTER — APPOINTMENT (OUTPATIENT)
Dept: PHARMACY | Age: 79
End: 2024-05-08
Payer: MEDICARE

## 2024-05-09 ENCOUNTER — ANTI-COAG VISIT (OUTPATIENT)
Dept: PHARMACY | Age: 79
End: 2024-05-09
Payer: MEDICARE

## 2024-05-09 DIAGNOSIS — Z95.2 S/P MITRAL VALVE REPLACEMENT: Chronic | ICD-10-CM

## 2024-05-09 DIAGNOSIS — I48.20 CHRONIC ATRIAL FIBRILLATION (HCC): ICD-10-CM

## 2024-05-09 DIAGNOSIS — Z95.2 S/P AORTIC VALVE REPLACEMENT: Primary | Chronic | ICD-10-CM

## 2024-05-09 LAB — INTERNATIONAL NORMALIZATION RATIO, POC: 2

## 2024-05-09 PROCEDURE — 85610 PROTHROMBIN TIME: CPT

## 2024-05-09 PROCEDURE — 99211 OFF/OP EST MAY X REQ PHY/QHP: CPT

## 2024-05-09 NOTE — PROGRESS NOTES
.Ms. Naye Peña is a 79 y.o. y/o female with history of Afib, Aortic and Mitral Valve  Porcine valve.    She presents today for anticoagulation monitoring and adjustment.    Pertinent PMH: The aortic and mitral valves are porcine and were replaced at the same time on March 9, 2010 by Dr. Jerome Jimenez.     Patient Reported Findings:  Yes     No  [x]   []       Patient verifies current dosing regimen as listed- confirms   []   [x]       S/S bleeding/bruising/swelling/SOB -denies   []   [x]       Blood in urine or stool -denies   []   [x]       Procedures scheduled in the future at this time  -denies    []   [x]       Missed Dose- denies    []   [x]       Extra Dose- denies   [x]   []       Change in medications- ran out of glucosamine chondroitin but plans to resume---> started MVI and probiotics -->  is taking MVI but erratic, explained best to be consistent---> back on probiotic and MVI, wants to start fish oil, explained interaction --> started fish oil BID --> started on Metformin 500mg BID x 15 days-->Md decided no need for Metformin (too many side effects)-BG was dropping too low  --> restarting iron last week---> started lasix q3d---> meclizine prn --> started vit d---> Amoxicillin, benzonatate, loratadine, albuterol and 5 day pred prescribed 3/17, finishes today --> switched coreg to metoprolol. ---> denies   [x]   []       Change in health/diet/appetite.  Her norm would be spinach, broccoli, shahzad or turnip greens twice a week and salads with iceberg other lower vitamin K --> Patient reports having shahzad greens 3 times weekly, this is an increase in vitamin K content from previous visits --> erratic, no NVD---> returned to normal vit k intake . States was having stomach --> maybe 2-3x/week, pt unsure ---> eating a small amount of greens three times/week --> has been eating greens 3-4 times a week---> less greens---> no greens --> returned to vit k 3x/week---> more greens --> continues with more vit k

## 2024-05-19 DIAGNOSIS — Z95.2 S/P AORTIC VALVE REPLACEMENT: ICD-10-CM

## 2024-05-20 RX ORDER — WARFARIN SODIUM 5 MG/1
TABLET ORAL
Qty: 90 TABLET | Refills: 1 | OUTPATIENT
Start: 2024-05-20

## 2024-05-20 NOTE — TELEPHONE ENCOUNTER
MIGUELITO Yancey to return the call. Warfarin was e-prescribed to [facility] on 4/26/24 for short term supply until mail order arrived. Does she need 90 day supply?

## 2024-05-30 ENCOUNTER — ANTI-COAG VISIT (OUTPATIENT)
Dept: PHARMACY | Age: 79
End: 2024-05-30
Payer: MEDICARE

## 2024-05-30 ENCOUNTER — HOSPITAL ENCOUNTER (OUTPATIENT)
Age: 79
Discharge: HOME | End: 2024-05-30
Payer: MEDICARE

## 2024-05-30 DIAGNOSIS — I48.20 CHRONIC ATRIAL FIBRILLATION (HCC): ICD-10-CM

## 2024-05-30 DIAGNOSIS — Z95.2 S/P AORTIC VALVE REPLACEMENT: Primary | Chronic | ICD-10-CM

## 2024-05-30 DIAGNOSIS — Z95.2 S/P MITRAL VALVE REPLACEMENT: Chronic | ICD-10-CM

## 2024-05-30 LAB
25(OH)D3 SERPL-MCNC: 43.4 NG/ML
ALBUMIN SERPL-MCNC: 4.1 G/DL (ref 3.4–5)
ALBUMIN/GLOB SERPL: 1.2 {RATIO} (ref 1.1–2.2)
ALP SERPL-CCNC: 100 U/L (ref 40–129)
ALT SERPL-CCNC: 17 U/L (ref 10–40)
ANION GAP SERPL CALCULATED.3IONS-SCNC: 11 MMOL/L (ref 3–16)
AST SERPL-CCNC: 22 U/L (ref 15–37)
BACTERIA URNS QL MICRO: ABNORMAL /HPF
BILIRUB SERPL-MCNC: 0.6 MG/DL (ref 0–1)
BILIRUB UR QL STRIP.AUTO: NEGATIVE
BUN SERPL-MCNC: 15 MG/DL (ref 7–20)
CALCIUM SERPL-MCNC: 9.1 MG/DL (ref 8.3–10.6)
CHLORIDE SERPL-SCNC: 107 MMOL/L (ref 99–110)
CHOLEST SERPL-MCNC: 141 MG/DL (ref 0–199)
CLARITY UR: CLEAR
CO2 SERPL-SCNC: 27 MMOL/L (ref 21–32)
COLOR UR: YELLOW
CREAT SERPL-MCNC: 0.9 MG/DL (ref 0.6–1.2)
DEPRECATED RDW RBC AUTO: 14.5 % (ref 12.4–15.4)
EPI CELLS #/AREA URNS AUTO: 3 /HPF (ref 0–5)
GFR SERPLBLD CREATININE-BSD FMLA CKD-EPI: 66 ML/MIN/{1.73_M2}
GLUCOSE SERPL-MCNC: 94 MG/DL (ref 70–99)
GLUCOSE UR STRIP.AUTO-MCNC: NEGATIVE MG/DL
HCT VFR BLD AUTO: 36.3 % (ref 36–48)
HDLC SERPL-MCNC: 51 MG/DL (ref 40–60)
HGB BLD-MCNC: 12.1 G/DL (ref 12–16)
HGB UR QL STRIP.AUTO: ABNORMAL
HYALINE CASTS #/AREA URNS AUTO: 0 /LPF (ref 0–8)
INTERNATIONAL NORMALIZATION RATIO, POC: 1.8
KETONES UR STRIP.AUTO-MCNC: NEGATIVE MG/DL
LDLC SERPL CALC-MCNC: 74 MG/DL
LEUKOCYTE ESTERASE UR QL STRIP.AUTO: ABNORMAL
MCH RBC QN AUTO: 29 PG (ref 26–34)
MCHC RBC AUTO-ENTMCNC: 33.3 G/DL (ref 31–36)
MCV RBC AUTO: 87.1 FL (ref 80–100)
NITRITE UR QL STRIP.AUTO: NEGATIVE
PH UR STRIP.AUTO: 6 [PH] (ref 5–8)
PLATELET # BLD AUTO: 154 K/UL (ref 135–450)
PMV BLD AUTO: 10.1 FL (ref 5–10.5)
POTASSIUM SERPL-SCNC: 4 MMOL/L (ref 3.5–5.1)
PROT SERPL-MCNC: 7.4 G/DL (ref 6.4–8.2)
PROT UR STRIP.AUTO-MCNC: NEGATIVE MG/DL
RBC # BLD AUTO: 4.17 M/UL (ref 4–5.2)
RBC CLUMPS #/AREA URNS AUTO: 3 /HPF (ref 0–4)
SODIUM SERPL-SCNC: 145 MMOL/L (ref 136–145)
SP GR UR STRIP.AUTO: 1.01 (ref 1–1.03)
TRIGL SERPL-MCNC: 79 MG/DL (ref 0–150)
TSH SERPL DL<=0.005 MIU/L-ACNC: 4.84 UIU/ML (ref 0.27–4.2)
UA DIPSTICK W REFLEX MICRO PNL UR: YES
URN SPEC COLLECT METH UR: ABNORMAL
UROBILINOGEN UR STRIP-ACNC: 1 E.U./DL
VLDLC SERPL CALC-MCNC: 16 MG/DL
WBC #/AREA URNS AUTO: 11 /HPF (ref 0–5)

## 2024-05-30 PROCEDURE — 85027 COMPLETE CBC AUTOMATED: CPT

## 2024-05-30 PROCEDURE — 81001 URINALYSIS AUTO W/SCOPE: CPT

## 2024-05-30 PROCEDURE — 80061 LIPID PANEL: CPT

## 2024-05-30 PROCEDURE — 82306 VITAMIN D 25 HYDROXY: CPT

## 2024-05-30 PROCEDURE — 85610 PROTHROMBIN TIME: CPT

## 2024-05-30 PROCEDURE — 80053 COMPREHEN METABOLIC PANEL: CPT

## 2024-05-30 PROCEDURE — 84443 ASSAY THYROID STIM HORMONE: CPT

## 2024-05-30 PROCEDURE — 99211 OFF/OP EST MAY X REQ PHY/QHP: CPT

## 2024-05-30 NOTE — PROGRESS NOTES
.Ms. Naye Peña is a 79 y.o. y/o female with history of Afib, Aortic and Mitral Valve  Porcine valve.    She presents today for anticoagulation monitoring and adjustment.    Pertinent PMH: The aortic and mitral valves are porcine and were replaced at the same time on March 9, 2010 by Dr. Jerome Jimenez.     Patient Reported Findings:  Yes     No  [x]   []       Patient verifies current dosing regimen as listed- confirms   []   [x]       S/S bleeding/bruising/swelling/SOB -denies   []   [x]       Blood in urine or stool -denies   []   [x]       Procedures scheduled in the future at this time  -denies    []   [x]       Missed Dose- denies    []   [x]       Extra Dose- denies   [x]   []       Change in medications- ran out of glucosamine chondroitin but plans to resume---> started MVI and probiotics -->  is taking MVI but erratic, explained best to be consistent---> back on probiotic and MVI, wants to start fish oil, explained interaction --> started fish oil BID --> restarting iron last week---> started vit d---> Amoxicillin, benzonatate, loratadine, albuterol and 5 day pred prescribed 3/17, finishes today --> switched coreg to metoprolol. ---> denies   [x]   []       Change in health/diet/appetite.  Her norm would be spinach, broccoli, shahzad or turnip greens twice a week and salads with iceberg other lower vitamin K --> Patient reports having shahzad greens 3 times weekly, this is an increase in vitamin K content from previous visits --> erratic, no NVD---> returned to normal vit k intake . States was having stomach --> maybe 2-3x/week, pt unsure ---> eating a small amount of greens three times/week --> has been eating greens 3-4 times a week---> less greens---> no greens --> returned to vit k 3x/week---> more greens --> continues with more vit k ---> no changes -->still about 2-3x/week---> less vit k recently---> more greens --> had vit k every other day. Discussed fluctuating vit k intake, plans to continue to

## 2024-06-07 ENCOUNTER — HOSPITAL ENCOUNTER (OUTPATIENT)
Dept: WOMENS IMAGING | Age: 79
Discharge: HOME | End: 2024-06-07
Payer: MEDICARE

## 2024-06-07 VITALS — BODY MASS INDEX: 34.93 KG/M2 | WEIGHT: 185 LBS | HEIGHT: 61 IN

## 2024-06-07 DIAGNOSIS — Z12.31 SCREENING MAMMOGRAM FOR BREAST CANCER: ICD-10-CM

## 2024-06-07 PROCEDURE — 77063 BREAST TOMOSYNTHESIS BI: CPT

## 2024-06-19 ENCOUNTER — APPOINTMENT (OUTPATIENT)
Dept: PHARMACY | Age: 79
End: 2024-06-19
Payer: MEDICARE

## 2024-06-20 ENCOUNTER — ANTI-COAG VISIT (OUTPATIENT)
Dept: PHARMACY | Age: 79
End: 2024-06-20
Payer: MEDICARE

## 2024-06-20 DIAGNOSIS — I48.20 CHRONIC ATRIAL FIBRILLATION (HCC): ICD-10-CM

## 2024-06-20 DIAGNOSIS — Z95.2 S/P AORTIC VALVE REPLACEMENT: Primary | Chronic | ICD-10-CM

## 2024-06-20 DIAGNOSIS — Z95.2 S/P MITRAL VALVE REPLACEMENT: Chronic | ICD-10-CM

## 2024-06-20 LAB — INTERNATIONAL NORMALIZATION RATIO, POC: 2.8

## 2024-06-20 PROCEDURE — 85610 PROTHROMBIN TIME: CPT

## 2024-06-20 PROCEDURE — 99211 OFF/OP EST MAY X REQ PHY/QHP: CPT

## 2024-06-27 NOTE — PROGRESS NOTES
[facility]   Cardiac Evaluation      Patient: Naye Peña  YOB: 1945  Date: 7/11/24       Chief Complaint   Patient presents with    Atrial Fibrillation    Hyperlipidemia          Referring provider: Maine Tobias MD    History of Present Illness:   Ms Peña is seen today for follow up visit. History includes porcine aortic/mitral valve replacements in 2010. She has hyperlipidemia and atrial flutter. Naye had rheumatic fever at the age of 16. She is retired from [employer].        Today, Ms Peña states since changing to Metoprolol she feels like \"it's slowing me down\". Naye denies chest pain, palpitations, BUENROSTRO, dizziness, or heart racing. She does have mild LE edema that comes and goes. She exercises at [employer]; she walks in the water.      Past Medical History:   has a past medical history of Aortic valve disease, rheumatic, Atrial flutter (HCC), Hypercholesteremia, and Mitral Valve Disease.    Surgical History:   has a past surgical history that includes Dilation and curettage of uterus; Cardioversion (02/04/2013); Colonoscopy; Aortic valve replacement (03/2010); Mitral valve replacement (03/2010); and Breast biopsy (Right, 02/27/2019).     Current Outpatient Medications   Medication Sig Dispense Refill    warfarin (COUMADIN) 5 MG tablet Take 0.5 tablets by mouth every 7 days AND 1 tablet Six times weekly. 30 tablet 0    metoprolol tartrate (LOPRESSOR) 50 MG tablet Take 1 tablet by mouth 2 times daily 180 tablet 3    simvastatin (ZOCOR) 40 MG tablet Take 1 tablet by mouth nightly 90 tablet 3    latanoprost (XALATAN) 0.005 % ophthalmic solution INSTILL 1 DROP INTO BOTH EYES EVERY EVENING AS DIRECTED      Probiotic Product (PROBIOTIC DAILY PO) Take by mouth daily      Multiple Vitamins-Minerals (CENTRUM SILVER) TABS Take 1 tablet by mouth as needed      furosemide (LASIX) 20 MG tablet Take 1 tablet by mouth every other day (Patient not taking: Reported on 7/11/2024)

## 2024-07-11 ENCOUNTER — OFFICE VISIT (OUTPATIENT)
Dept: CARDIOLOGY CLINIC | Age: 79
End: 2024-07-11
Payer: MEDICARE

## 2024-07-11 VITALS
BODY MASS INDEX: 34.36 KG/M2 | WEIGHT: 182 LBS | SYSTOLIC BLOOD PRESSURE: 110 MMHG | OXYGEN SATURATION: 98 % | HEIGHT: 61 IN | HEART RATE: 108 BPM | DIASTOLIC BLOOD PRESSURE: 66 MMHG

## 2024-07-11 DIAGNOSIS — Z95.2 S/P MITRAL VALVE REPLACEMENT: ICD-10-CM

## 2024-07-11 DIAGNOSIS — I48.20 CHRONIC ATRIAL FIBRILLATION (HCC): Primary | ICD-10-CM

## 2024-07-11 DIAGNOSIS — E78.5 HYPERLIPIDEMIA, UNSPECIFIED HYPERLIPIDEMIA TYPE: ICD-10-CM

## 2024-07-11 DIAGNOSIS — Z95.2 S/P AORTIC VALVE REPLACEMENT: ICD-10-CM

## 2024-07-11 PROCEDURE — 93000 ELECTROCARDIOGRAM COMPLETE: CPT | Performed by: INTERNAL MEDICINE

## 2024-07-11 PROCEDURE — 1090F PRES/ABSN URINE INCON ASSESS: CPT | Performed by: INTERNAL MEDICINE

## 2024-07-11 PROCEDURE — 1036F TOBACCO NON-USER: CPT | Performed by: INTERNAL MEDICINE

## 2024-07-11 PROCEDURE — G8417 CALC BMI ABV UP PARAM F/U: HCPCS | Performed by: INTERNAL MEDICINE

## 2024-07-11 PROCEDURE — 99214 OFFICE O/P EST MOD 30 MIN: CPT | Performed by: INTERNAL MEDICINE

## 2024-07-11 PROCEDURE — G8427 DOCREV CUR MEDS BY ELIG CLIN: HCPCS | Performed by: INTERNAL MEDICINE

## 2024-07-11 PROCEDURE — G8400 PT W/DXA NO RESULTS DOC: HCPCS | Performed by: INTERNAL MEDICINE

## 2024-07-11 PROCEDURE — 1123F ACP DISCUSS/DSCN MKR DOCD: CPT | Performed by: INTERNAL MEDICINE

## 2024-07-18 ENCOUNTER — ANTI-COAG VISIT (OUTPATIENT)
Dept: PHARMACY | Age: 79
End: 2024-07-18
Payer: MEDICARE

## 2024-07-18 DIAGNOSIS — Z95.2 S/P MITRAL VALVE REPLACEMENT: Chronic | ICD-10-CM

## 2024-07-18 DIAGNOSIS — Z95.2 S/P AORTIC VALVE REPLACEMENT: Primary | Chronic | ICD-10-CM

## 2024-07-18 DIAGNOSIS — I48.20 CHRONIC ATRIAL FIBRILLATION (HCC): ICD-10-CM

## 2024-07-18 LAB
INTERNATIONAL NORMALIZATION RATIO, POC: 2
PROTHROMBIN TIME, POC: NORMAL

## 2024-07-18 PROCEDURE — 85610 PROTHROMBIN TIME: CPT

## 2024-07-18 PROCEDURE — 99211 OFF/OP EST MAY X REQ PHY/QHP: CPT

## 2024-08-15 ENCOUNTER — ANTI-COAG VISIT (OUTPATIENT)
Dept: PHARMACY | Age: 79
End: 2024-08-15
Payer: MEDICARE

## 2024-08-15 DIAGNOSIS — Z95.2 S/P AORTIC VALVE REPLACEMENT: Primary | Chronic | ICD-10-CM

## 2024-08-15 DIAGNOSIS — Z95.2 S/P MITRAL VALVE REPLACEMENT: Chronic | ICD-10-CM

## 2024-08-15 DIAGNOSIS — I48.20 CHRONIC ATRIAL FIBRILLATION (HCC): ICD-10-CM

## 2024-08-15 LAB
INTERNATIONAL NORMALIZATION RATIO, POC: 3.4
PROTHROMBIN TIME, POC: 0

## 2024-08-15 PROCEDURE — 99211 OFF/OP EST MAY X REQ PHY/QHP: CPT

## 2024-08-15 PROCEDURE — 85610 PROTHROMBIN TIME: CPT

## 2024-09-12 ENCOUNTER — ANTI-COAG VISIT (OUTPATIENT)
Dept: PHARMACY | Age: 79
End: 2024-09-12
Payer: MEDICARE

## 2024-09-12 DIAGNOSIS — I48.20 CHRONIC ATRIAL FIBRILLATION (HCC): ICD-10-CM

## 2024-09-12 DIAGNOSIS — Z95.2 S/P MITRAL VALVE REPLACEMENT: Chronic | ICD-10-CM

## 2024-09-12 DIAGNOSIS — Z95.2 S/P AORTIC VALVE REPLACEMENT: Primary | Chronic | ICD-10-CM

## 2024-09-12 LAB
INTERNATIONAL NORMALIZATION RATIO, POC: 3.5
PROTHROMBIN TIME, POC: 0

## 2024-09-12 PROCEDURE — 99212 OFFICE O/P EST SF 10 MIN: CPT

## 2024-09-12 PROCEDURE — 85610 PROTHROMBIN TIME: CPT

## 2024-09-27 ENCOUNTER — ANTI-COAG VISIT (OUTPATIENT)
Dept: PHARMACY | Age: 79
End: 2024-09-27
Payer: MEDICARE

## 2024-09-27 DIAGNOSIS — I48.20 CHRONIC ATRIAL FIBRILLATION (HCC): ICD-10-CM

## 2024-09-27 DIAGNOSIS — Z95.2 S/P AORTIC VALVE REPLACEMENT: Primary | Chronic | ICD-10-CM

## 2024-09-27 DIAGNOSIS — Z95.2 S/P MITRAL VALVE REPLACEMENT: Chronic | ICD-10-CM

## 2024-09-27 LAB
INTERNATIONAL NORMALIZATION RATIO, POC: 2.1
PROTHROMBIN TIME, POC: 0

## 2024-09-27 PROCEDURE — 99211 OFF/OP EST MAY X REQ PHY/QHP: CPT

## 2024-09-27 PROCEDURE — 85610 PROTHROMBIN TIME: CPT

## 2024-10-11 ENCOUNTER — ANTI-COAG VISIT (OUTPATIENT)
Dept: PHARMACY | Age: 79
End: 2024-10-11
Payer: MEDICARE

## 2024-10-11 DIAGNOSIS — Z95.2 S/P AORTIC VALVE REPLACEMENT: Primary | Chronic | ICD-10-CM

## 2024-10-11 DIAGNOSIS — I48.20 CHRONIC ATRIAL FIBRILLATION (HCC): ICD-10-CM

## 2024-10-11 DIAGNOSIS — Z95.2 S/P MITRAL VALVE REPLACEMENT: Chronic | ICD-10-CM

## 2024-10-11 LAB
INTERNATIONAL NORMALIZATION RATIO, POC: 2.4
PROTHROMBIN TIME, POC: 0

## 2024-10-11 PROCEDURE — 85610 PROTHROMBIN TIME: CPT | Performed by: PHYSICIAN ASSISTANT

## 2024-10-11 PROCEDURE — 99211 OFF/OP EST MAY X REQ PHY/QHP: CPT | Performed by: PHYSICIAN ASSISTANT

## 2024-10-11 NOTE — PROGRESS NOTES
.Ms. Naye Peña is a 79 y.o. y/o female with history of Afib, Aortic and Mitral Valve  Porcine valve.    She presents today for anticoagulation monitoring and adjustment.    Pertinent PMH: The aortic and mitral valves are porcine and were replaced at the same time on March 9, 2010 by Dr. Jerome Jimenez.     Patient Reported Findings:  Yes     No  [x]   []       Patient verifies current dosing regimen as listed- confirms   []   [x]       S/S bleeding/bruising/swelling/SOB -denies   []   [x]       Blood in urine or stool -denies   []   [x]       Procedures scheduled in the future at this time  -denies    []   [x]       Missed Dose- denies    []   [x]       Extra Dose- denies   []   [x]       Change in medications- ran out of glucosamine chondroitin but plans to resume---> is taking MVI but erratic, explained best to be consistent---> back on probiotic and MVI, wants to start fish oil, explained interaction --> started fish oil BID --> restarting iron last week---> started vit d---> switched coreg to metoprolol. ---> denies   [x]   []       Change in health/diet/appetite.  Her norm would be spinach, broccoli, shahzad or turnip greens twice a week and salads with iceberg other lower vitamin K --> Patient reports having shahzad greens 3 times weekly, this is an increase in vitamin K content from previous visits --> erratic, no NVD---> returned to normal vit k intake . States was having stomach --> maybe 2-3x/week, pt unsure ---> eating a small amount of greens three times/week --> has been eating greens 3-4 times a week---> less greens---> no greens --> returned to vit k 3x/week---> more greens --> continues with more vit k ---> no changes -->still about 2-3x/week---> less vit k recently---> more greens --> had vit k every other day. Discussed fluctuating vit k intake, plans to continue to have every other day---> has not had greens--> eating more greens---> less greens --> states increased vegetable intake, especially

## 2024-11-01 ENCOUNTER — ANTI-COAG VISIT (OUTPATIENT)
Dept: PHARMACY | Age: 79
End: 2024-11-01
Payer: MEDICARE

## 2024-11-01 DIAGNOSIS — I48.20 CHRONIC ATRIAL FIBRILLATION (HCC): ICD-10-CM

## 2024-11-01 DIAGNOSIS — Z95.2 S/P MITRAL VALVE REPLACEMENT: Chronic | ICD-10-CM

## 2024-11-01 DIAGNOSIS — Z95.2 S/P AORTIC VALVE REPLACEMENT: Primary | Chronic | ICD-10-CM

## 2024-11-01 LAB
INTERNATIONAL NORMALIZATION RATIO, POC: 2.5
PROTHROMBIN TIME, POC: 0

## 2024-11-01 PROCEDURE — 99211 OFF/OP EST MAY X REQ PHY/QHP: CPT

## 2024-11-01 PROCEDURE — 85610 PROTHROMBIN TIME: CPT

## 2024-11-15 RX ORDER — SIMVASTATIN 40 MG
40 TABLET ORAL NIGHTLY
Qty: 90 TABLET | Refills: 3 | Status: SHIPPED | OUTPATIENT
Start: 2024-11-15

## 2024-12-02 ENCOUNTER — ANTI-COAG VISIT (OUTPATIENT)
Dept: PHARMACY | Age: 79
End: 2024-12-02
Payer: MEDICARE

## 2024-12-02 DIAGNOSIS — Z95.2 S/P MITRAL VALVE REPLACEMENT: Chronic | ICD-10-CM

## 2024-12-02 DIAGNOSIS — Z95.2 S/P AORTIC VALVE REPLACEMENT: Primary | Chronic | ICD-10-CM

## 2024-12-02 DIAGNOSIS — I48.20 CHRONIC ATRIAL FIBRILLATION (HCC): ICD-10-CM

## 2024-12-02 LAB
INTERNATIONAL NORMALIZATION RATIO, POC: 3.8
PROTHROMBIN TIME, POC: 0

## 2024-12-02 PROCEDURE — 85610 PROTHROMBIN TIME: CPT | Performed by: PHYSICIAN ASSISTANT

## 2024-12-02 PROCEDURE — 99212 OFFICE O/P EST SF 10 MIN: CPT | Performed by: PHYSICIAN ASSISTANT

## 2024-12-23 ENCOUNTER — ANTI-COAG VISIT (OUTPATIENT)
Dept: PHARMACY | Age: 79
End: 2024-12-23
Payer: MEDICARE

## 2024-12-23 DIAGNOSIS — Z95.2 S/P MITRAL VALVE REPLACEMENT: Chronic | ICD-10-CM

## 2024-12-23 DIAGNOSIS — I48.20 CHRONIC ATRIAL FIBRILLATION (HCC): ICD-10-CM

## 2024-12-23 DIAGNOSIS — Z95.2 S/P AORTIC VALVE REPLACEMENT: Primary | Chronic | ICD-10-CM

## 2024-12-23 LAB
INTERNATIONAL NORMALIZATION RATIO, POC: 2.4
PROTHROMBIN TIME, POC: 0

## 2024-12-23 PROCEDURE — 99211 OFF/OP EST MAY X REQ PHY/QHP: CPT

## 2024-12-23 PROCEDURE — 85610 PROTHROMBIN TIME: CPT

## 2025-01-08 NOTE — PROGRESS NOTES
[facility]   Cardiac Evaluation      Patient: Naye Peña  YOB: 1945  Date: 1/17/25       Chief Complaint   Patient presents with    Atrial Fibrillation    Hyperlipidemia          Referring provider: Maine Tobias MD    History of Present Illness:   Ms Peña is seen today for follow up visit. History includes porcine aortic/mitral valve replacements in 2010 due to rheumatic heart disease at age 16. She has hyperlipidemia and atrial flutter.  She is retired from [employer].        Today, Ms Peña states she has been diagnosed with glaucoma. Naye denies chest pain, palpitations, dizziness, or edema. She has shortness of breath when walking too fast.      Past Medical History:   has a past medical history of Aortic valve disease, rheumatic, Atrial flutter (HCC), Hypercholesteremia, and Mitral Valve Disease.    Surgical History:   has a past surgical history that includes Dilation and curettage of uterus; Cardioversion (02/04/2013); Colonoscopy; Aortic valve replacement (03/2010); Mitral valve replacement (03/2010); and Breast biopsy (Right, 02/27/2019).     Current Outpatient Medications   Medication Sig Dispense Refill    simvastatin (ZOCOR) 40 MG tablet TAKE 1 TABLET EVERY NIGHT 90 tablet 3    warfarin (COUMADIN) 5 MG tablet Take 0.5 tablets by mouth every 7 days AND 1 tablet Six times weekly. 30 tablet 0    metoprolol tartrate (LOPRESSOR) 50 MG tablet Take 1 tablet by mouth 2 times daily 180 tablet 3    furosemide (LASIX) 20 MG tablet Take 1 tablet by mouth as needed      Probiotic Product (PROBIOTIC DAILY PO) Take by mouth daily      Multiple Vitamins-Minerals (CENTRUM SILVER) TABS Take 1 tablet by mouth as needed      meclizine (ANTIVERT) 12.5 MG tablet TAKE ONE TABLET THREE TIMES AS NEEDED      acetaminophen (TYLENOL) 500 MG tablet Take 1 tablet by mouth every 6 hours as needed for Pain       No current facility-administered medications for this visit.       Social

## 2025-01-17 ENCOUNTER — OFFICE VISIT (OUTPATIENT)
Dept: CARDIOLOGY CLINIC | Age: 80
End: 2025-01-17
Payer: MEDICARE

## 2025-01-17 VITALS
HEART RATE: 95 BPM | WEIGHT: 188 LBS | OXYGEN SATURATION: 100 % | SYSTOLIC BLOOD PRESSURE: 104 MMHG | BODY MASS INDEX: 35.5 KG/M2 | DIASTOLIC BLOOD PRESSURE: 62 MMHG | HEIGHT: 61 IN

## 2025-01-17 DIAGNOSIS — Z95.2 S/P AORTIC VALVE REPLACEMENT: ICD-10-CM

## 2025-01-17 DIAGNOSIS — Z95.2 S/P MITRAL VALVE REPLACEMENT: ICD-10-CM

## 2025-01-17 DIAGNOSIS — E78.5 HYPERLIPIDEMIA, UNSPECIFIED HYPERLIPIDEMIA TYPE: ICD-10-CM

## 2025-01-17 DIAGNOSIS — I48.20 CHRONIC ATRIAL FIBRILLATION (HCC): Primary | ICD-10-CM

## 2025-01-17 DIAGNOSIS — R06.02 SHORTNESS OF BREATH: ICD-10-CM

## 2025-01-17 PROCEDURE — G8417 CALC BMI ABV UP PARAM F/U: HCPCS | Performed by: INTERNAL MEDICINE

## 2025-01-17 PROCEDURE — 1159F MED LIST DOCD IN RCRD: CPT | Performed by: INTERNAL MEDICINE

## 2025-01-17 PROCEDURE — 1123F ACP DISCUSS/DSCN MKR DOCD: CPT | Performed by: INTERNAL MEDICINE

## 2025-01-17 PROCEDURE — 1090F PRES/ABSN URINE INCON ASSESS: CPT | Performed by: INTERNAL MEDICINE

## 2025-01-17 PROCEDURE — 1036F TOBACCO NON-USER: CPT | Performed by: INTERNAL MEDICINE

## 2025-01-17 PROCEDURE — G8400 PT W/DXA NO RESULTS DOC: HCPCS | Performed by: INTERNAL MEDICINE

## 2025-01-17 PROCEDURE — G8427 DOCREV CUR MEDS BY ELIG CLIN: HCPCS | Performed by: INTERNAL MEDICINE

## 2025-01-17 PROCEDURE — 99214 OFFICE O/P EST MOD 30 MIN: CPT | Performed by: INTERNAL MEDICINE

## 2025-01-20 ENCOUNTER — ANTI-COAG VISIT (OUTPATIENT)
Dept: PHARMACY | Age: 80
End: 2025-01-20
Payer: MEDICARE

## 2025-01-20 DIAGNOSIS — I48.20 CHRONIC ATRIAL FIBRILLATION (HCC): ICD-10-CM

## 2025-01-20 DIAGNOSIS — Z95.2 S/P MITRAL VALVE REPLACEMENT: Chronic | ICD-10-CM

## 2025-01-20 DIAGNOSIS — Z95.2 S/P AORTIC VALVE REPLACEMENT: Primary | Chronic | ICD-10-CM

## 2025-01-20 LAB
INTERNATIONAL NORMALIZATION RATIO, POC: 2
PROTHROMBIN TIME, POC: 0

## 2025-01-20 PROCEDURE — 85610 PROTHROMBIN TIME: CPT | Performed by: PHYSICIAN ASSISTANT

## 2025-01-20 PROCEDURE — 99211 OFF/OP EST MAY X REQ PHY/QHP: CPT | Performed by: PHYSICIAN ASSISTANT

## 2025-01-20 NOTE — PROGRESS NOTES
.Ms. Naye Peña is a 79 y.o. y/o female with history of Afib, Aortic and Mitral Valve  Porcine valve.    She presents today for anticoagulation monitoring and adjustment.    Pertinent PMH: The aortic and mitral valves are porcine and were replaced at the same time on March 9, 2010 by Dr. Jerome Jimenez.     Patient Reported Findings:  Yes     No  [x]   []       Patient verifies current dosing regimen as listed- confirms   []   [x]       S/S bleeding/bruising/swelling/SOB -denies   []   [x]       Blood in urine or stool -denies   []   [x]       Procedures scheduled in the future at this time  -denies    []   [x]       Missed Dose- denies    []   [x]       Extra Dose- denies   []   [x]       Change in medications- ran out of glucosamine chondroitin but plans to resume---> is taking MVI but erratic, explained best to be consistent---> back on probiotic and MVI, wants to start fish oil, explained interaction --> started fish oil BID --> restarting iron last week---> started vit d---> switched coreg to metoprolol. ---> denies   []   [x]       Change in health/diet/appetite.  Her norm would be spinach, broccoli, shahzad or turnip greens twice a week and salads with iceberg other lower vitamin K --> Patient reports having shahzad greens 3 times weekly, this is an increase in vitamin K content from previous visits --> erratic, no NVD---> returned to normal vit k intake . States was having stomach --> maybe 2-3x/week, pt unsure ---> eating a small amount of greens three times/week --> has been eating greens 3-4 times a week---> less greens---> no greens --> returned to vit k 3x/week---> more greens --> continues with more vit k ---> no changes -->still about 2-3x/week---> less vit k recently---> more greens --> had vit k every other day. Discussed fluctuating vit k intake, plans to continue to have every other day---> has not had greens--> eating more greens---> less greens --> states increased vegetable intake, especially

## 2025-02-17 ENCOUNTER — APPOINTMENT (OUTPATIENT)
Dept: PHARMACY | Age: 80
End: 2025-02-17
Payer: MEDICARE

## 2025-02-24 ENCOUNTER — ANTI-COAG VISIT (OUTPATIENT)
Dept: PHARMACY | Age: 80
End: 2025-02-24
Payer: MEDICARE

## 2025-02-24 DIAGNOSIS — Z95.2 S/P AORTIC VALVE REPLACEMENT: Primary | Chronic | ICD-10-CM

## 2025-02-24 DIAGNOSIS — Z95.2 S/P MITRAL VALVE REPLACEMENT: Chronic | ICD-10-CM

## 2025-02-24 DIAGNOSIS — I48.20 CHRONIC ATRIAL FIBRILLATION (HCC): ICD-10-CM

## 2025-02-24 LAB
INTERNATIONAL NORMALIZATION RATIO, POC: 2.2
PROTHROMBIN TIME, POC: 0

## 2025-02-24 PROCEDURE — 85610 PROTHROMBIN TIME: CPT

## 2025-02-24 PROCEDURE — 99211 OFF/OP EST MAY X REQ PHY/QHP: CPT

## 2025-02-27 RX ORDER — METOPROLOL TARTRATE 50 MG
50 TABLET ORAL 2 TIMES DAILY
Qty: 180 TABLET | Refills: 3 | Status: SHIPPED | OUTPATIENT
Start: 2025-02-27

## 2025-03-24 ENCOUNTER — ANTI-COAG VISIT (OUTPATIENT)
Dept: PHARMACY | Age: 80
End: 2025-03-24
Payer: MEDICARE

## 2025-03-24 DIAGNOSIS — Z95.2 S/P AORTIC VALVE REPLACEMENT: Primary | Chronic | ICD-10-CM

## 2025-03-24 DIAGNOSIS — Z95.2 S/P MITRAL VALVE REPLACEMENT: Chronic | ICD-10-CM

## 2025-03-24 DIAGNOSIS — I48.20 CHRONIC ATRIAL FIBRILLATION (HCC): ICD-10-CM

## 2025-03-24 LAB
INTERNATIONAL NORMALIZATION RATIO, POC: 3.1
PROTHROMBIN TIME, POC: 0

## 2025-03-24 PROCEDURE — 99211 OFF/OP EST MAY X REQ PHY/QHP: CPT | Performed by: PHARMACIST

## 2025-03-24 PROCEDURE — 85610 PROTHROMBIN TIME: CPT | Performed by: PHARMACIST

## 2025-03-24 NOTE — PROGRESS NOTES
cabbage, spinach, broccoli, mixed greens, & salads --> vit k 3x/week---> twice/week --> had vit k less, 2-3 in past few weeks---> no changes --> didn't have as many greens this week  []   [x]       Change in alcohol use ---> Denies  []   [x]       Change in activity  []   [x]       Hospital admission  []   [x]       Emergency department visit  []   [x]       Other complaints     Clinical Outcomes:  Yes     No  []   [x]       Major bleeding event  []   [x]       Thromboembolic event    Duration of warfarin Therapy: Indefinitely  INR Range:  2.0-3.0   Keep at 2.5-3.0 per the collaborative agreement from Dr. Malena GUILLEN warfarin at [facility] pharmacy     INR is 3.1 today   Continue dose of 2.5mg on Thursdays only and 5mg all other days.   Recheck INR in 4 weeks, 4/21/25    Consent signed 12/23/24    Referring is Dr. Margaret Pascal   INR (no units)   Date Value   04/28/2020 2.0 (H)   02/27/2019 1.11   09/17/2018 4   08/20/2018 2.8     INR,(POC) (no units)   Date Value   03/24/2025 3.1   02/24/2025 2.2   01/20/2025 2.0   12/23/2024 2.4     For Pharmacy Admin Tracking Only    Intervention Detail:   Total # of Interventions Recommended: 0  Total # of Interventions Accepted: 0  Time Spent (min): 10    Josefina Mata, PharmD 03/24/25  11:39 AM

## 2025-03-28 DIAGNOSIS — Z95.2 S/P AORTIC VALVE REPLACEMENT: ICD-10-CM

## 2025-03-31 RX ORDER — WARFARIN SODIUM 5 MG/1
TABLET ORAL
Qty: 83 TABLET | Refills: 3 | Status: SHIPPED | OUTPATIENT
Start: 2025-03-31

## 2025-04-16 ENCOUNTER — OFFICE VISIT (OUTPATIENT)
Dept: INTERNAL MEDICINE CLINIC | Age: 80
End: 2025-04-16
Payer: MEDICARE

## 2025-04-16 VITALS
HEART RATE: 100 BPM | HEIGHT: 62 IN | OXYGEN SATURATION: 98 % | WEIGHT: 190.4 LBS | SYSTOLIC BLOOD PRESSURE: 108 MMHG | BODY MASS INDEX: 35.04 KG/M2 | DIASTOLIC BLOOD PRESSURE: 74 MMHG

## 2025-04-16 DIAGNOSIS — Z00.00 MEDICARE ANNUAL WELLNESS VISIT, SUBSEQUENT: Primary | ICD-10-CM

## 2025-04-16 DIAGNOSIS — Z78.0 ASYMPTOMATIC MENOPAUSE: ICD-10-CM

## 2025-04-16 DIAGNOSIS — R73.01 IMPAIRED FASTING BLOOD SUGAR: ICD-10-CM

## 2025-04-16 DIAGNOSIS — I48.20 CHRONIC ATRIAL FIBRILLATION (HCC): Primary | ICD-10-CM

## 2025-04-16 DIAGNOSIS — Z95.2 S/P MITRAL VALVE REPLACEMENT: Chronic | ICD-10-CM

## 2025-04-16 DIAGNOSIS — E78.2 MIXED HYPERLIPIDEMIA: Chronic | ICD-10-CM

## 2025-04-16 DIAGNOSIS — Z79.01 CURRENT USE OF LONG TERM ANTICOAGULATION: ICD-10-CM

## 2025-04-16 DIAGNOSIS — R60.0 BILATERAL LOWER EXTREMITY EDEMA: ICD-10-CM

## 2025-04-16 DIAGNOSIS — Z95.2 S/P AORTIC VALVE REPLACEMENT: Chronic | ICD-10-CM

## 2025-04-16 PROCEDURE — 1123F ACP DISCUSS/DSCN MKR DOCD: CPT | Performed by: INTERNAL MEDICINE

## 2025-04-16 PROCEDURE — 99204 OFFICE O/P NEW MOD 45 MIN: CPT | Performed by: INTERNAL MEDICINE

## 2025-04-16 PROCEDURE — 1036F TOBACCO NON-USER: CPT | Performed by: INTERNAL MEDICINE

## 2025-04-16 PROCEDURE — G8400 PT W/DXA NO RESULTS DOC: HCPCS | Performed by: INTERNAL MEDICINE

## 2025-04-16 PROCEDURE — G0439 PPPS, SUBSEQ VISIT: HCPCS | Performed by: INTERNAL MEDICINE

## 2025-04-16 PROCEDURE — 1090F PRES/ABSN URINE INCON ASSESS: CPT | Performed by: INTERNAL MEDICINE

## 2025-04-16 PROCEDURE — G8427 DOCREV CUR MEDS BY ELIG CLIN: HCPCS | Performed by: INTERNAL MEDICINE

## 2025-04-16 PROCEDURE — G8417 CALC BMI ABV UP PARAM F/U: HCPCS | Performed by: INTERNAL MEDICINE

## 2025-04-16 PROCEDURE — 1159F MED LIST DOCD IN RCRD: CPT | Performed by: INTERNAL MEDICINE

## 2025-04-16 SDOH — ECONOMIC STABILITY: FOOD INSECURITY: WITHIN THE PAST 12 MONTHS, YOU WORRIED THAT YOUR FOOD WOULD RUN OUT BEFORE YOU GOT MONEY TO BUY MORE.: NEVER TRUE

## 2025-04-16 SDOH — ECONOMIC STABILITY: FOOD INSECURITY: WITHIN THE PAST 12 MONTHS, THE FOOD YOU BOUGHT JUST DIDN'T LAST AND YOU DIDN'T HAVE MONEY TO GET MORE.: NEVER TRUE

## 2025-04-16 ASSESSMENT — ENCOUNTER SYMPTOMS
FACIAL SWELLING: 0
SHORTNESS OF BREATH: 0
WHEEZING: 0
SINUS PRESSURE: 0
SORE THROAT: 0
COUGH: 0
CONSTIPATION: 0
NAUSEA: 0
VOMITING: 0
CHEST TIGHTNESS: 0
BACK PAIN: 0
COLOR CHANGE: 0
TROUBLE SWALLOWING: 0
ABDOMINAL PAIN: 0

## 2025-04-16 ASSESSMENT — PATIENT HEALTH QUESTIONNAIRE - PHQ9
2. FEELING DOWN, DEPRESSED OR HOPELESS: NOT AT ALL
SUM OF ALL RESPONSES TO PHQ QUESTIONS 1-9: 0
SUM OF ALL RESPONSES TO PHQ QUESTIONS 1-9: 0
1. LITTLE INTEREST OR PLEASURE IN DOING THINGS: NOT AT ALL
SUM OF ALL RESPONSES TO PHQ QUESTIONS 1-9: 0
SUM OF ALL RESPONSES TO PHQ QUESTIONS 1-9: 0

## 2025-04-16 ASSESSMENT — LIFESTYLE VARIABLES
HOW MANY STANDARD DRINKS CONTAINING ALCOHOL DO YOU HAVE ON A TYPICAL DAY: PATIENT DOES NOT DRINK
HOW OFTEN DO YOU HAVE A DRINK CONTAINING ALCOHOL: NEVER

## 2025-04-16 NOTE — ASSESSMENT & PLAN NOTE
Has been stable and fairly well-controlled on metoprolol under supervision of cardiology, remains on full anticoagulation with Coumadin due to presence of artificial valve as well.  Continue same

## 2025-04-16 NOTE — ASSESSMENT & PLAN NOTE
Discussed with patient recommendations to adhere to healthy low-carb low-fat diet with active lifestyle and increase level of physical activity as tolerated, preferably regular 30 to 45 minutes at least 3 times a week .

## 2025-04-16 NOTE — ASSESSMENT & PLAN NOTE
Compliant with Coumadin use and follow-up at the Coumadin clinic.  Patient aware of dietary recommendations pertaining to Coumadin along with other drug interactions and will continue INR monitoring.  No concerns for bleeding

## 2025-04-16 NOTE — PROGRESS NOTES
Medicare Annual Wellness Visit  Name: Naye Peña Today’s Date: 2025   MRN: [id number] Sex: Female   Age: 80 y.o. Ethnicity: Non- / Non    : 1945 Race: Black /       Naye Peña is here for Medicare AWV     Screenings for behavioral, psychosocial and functional/safety risks, and cognitive dysfunction are all negative except as indicated below. These results, as well as other patient data from the Health Risk Assessment form, are documented in Flowsheets linked to this Encounter.    No Known Allergies    Prior to Visit Medications    Medication Sig Taking? Authorizing Provider   warfarin (COUMADIN) 5 MG tablet TAKE 1/2 TABLET EVERY 7 DAYS AND 1 TABLET SIX TIMES WEEKLY.  Margaret Pascal MD   metoprolol tartrate (LOPRESSOR) 50 MG tablet TAKE 1 TABLET TWICE DAILY  Margaret Pascal MD   simvastatin (ZOCOR) 40 MG tablet TAKE 1 TABLET EVERY NIGHT  Margaret Pascal MD   furosemide (LASIX) 20 MG tablet Take 1 tablet by mouth as needed  ProviderRossana MD   meclizine (ANTIVERT) 12.5 MG tablet TAKE ONE TABLET THREE TIMES AS NEEDED  ProviderRossana MD   acetaminophen (TYLENOL) 500 MG tablet Take 1 tablet by mouth every 6 hours as needed for Pain  ProviderRossana MD   Probiotic Product (PROBIOTIC DAILY PO) Take by mouth daily  ProviderRossana MD   Multiple Vitamins-Minerals (CENTRUM SILVER) TABS Take 1 tablet by mouth as needed  ProviderRossana MD       Past Medical History:   Diagnosis Date    Aortic valve disease, rheumatic     Atrial flutter (HCC)     Hypercholesteremia     Mitral Valve Disease      Past Surgical History:   Procedure Laterality Date    AORTIC VALVE REPLACEMENT  2010    BREAST BIOPSY Right 2019    RIGHT NEEDLE LOCALIZED EXCISIONAL BREAST BIOPSY performed by Monica Boyd MD at [facility] OR    CARDIOVERSION  2013    AF    COLONOSCOPY      DILATION AND CURETTAGE OF UTERUS      MITRAL VALVE REPLACEMENT

## 2025-04-16 NOTE — ASSESSMENT & PLAN NOTE
Trace pitting edema on exam bilateral, patient uses Lasix as needed when symptoms worsen but all in all it is mild and manageable.  Recommendations made to attempt weight loss, increase level of physical activity, keep legs elevated when sitting and use compression socks

## 2025-04-16 NOTE — PROGRESS NOTES
ASSESSMENT/PLAN:  1. Chronic atrial fibrillation (HCC)  Assessment & Plan:  Has been stable and fairly well-controlled on metoprolol under supervision of cardiology, remains on full anticoagulation with Coumadin due to presence of artificial valve as well.  Continue same   Orders:  -     CBC; Future  -     Comprehensive Metabolic Panel; Future  -     Hepatitis C Antibody; Future  2. Mixed hyperlipidemia  Assessment & Plan:  Will update labs and make recommendations on adjusting simvastatin if needed otherwise continue same along with diet and exercise   Orders:  -     CBC; Future  -     Comprehensive Metabolic Panel; Future  -     Hemoglobin A1C; Future  -     Lipid Panel; Future  -     TSH reflex to FT4; Future  -     Hepatitis C Antibody; Future  3. S/P aortic valve replacement  Assessment & Plan:   denies any symptoms, stable on Coumadin and low-dose beta-blocker, continue care and recommendation as per cardiology   4. Current use of long term anticoagulation  Assessment & Plan:  Compliant with Coumadin use and follow-up at the Coumadin clinic.  Patient aware of dietary recommendations pertaining to Coumadin along with other drug interactions and will continue INR monitoring.  No concerns for bleeding   5. S/P mitral valve replacement  Assessment & Plan:  Stable, continue long-term anticoagulation follow-up with cardiology as scheduled   6. Bilateral lower extremity edema  Assessment & Plan:  Trace pitting edema on exam bilateral, patient uses Lasix as needed when symptoms worsen but all in all it is mild and manageable.  Recommendations made to attempt weight loss, increase level of physical activity, keep legs elevated when sitting and use compression socks   7. BMI 34.0-34.9,adult  Assessment & Plan:  Discussed with patient recommendations to adhere to healthy low-carb low-fat diet with active lifestyle and increase level of physical activity as tolerated, preferably regular 30 to 45 minutes at least 3 times a

## 2025-04-16 NOTE — ASSESSMENT & PLAN NOTE
denies any symptoms, stable on Coumadin and low-dose beta-blocker, continue care and recommendation as per cardiology

## 2025-04-16 NOTE — ASSESSMENT & PLAN NOTE
Will update labs and make recommendations on adjusting simvastatin if needed otherwise continue same along with diet and exercise

## 2025-04-21 ENCOUNTER — HOSPITAL ENCOUNTER (OUTPATIENT)
Age: 80
Discharge: HOME | End: 2025-04-21
Payer: MEDICARE

## 2025-04-21 ENCOUNTER — ANTI-COAG VISIT (OUTPATIENT)
Dept: PHARMACY | Age: 80
End: 2025-04-21
Payer: MEDICARE

## 2025-04-21 DIAGNOSIS — I48.20 CHRONIC ATRIAL FIBRILLATION (HCC): ICD-10-CM

## 2025-04-21 DIAGNOSIS — Z95.2 S/P MITRAL VALVE REPLACEMENT: Chronic | ICD-10-CM

## 2025-04-21 DIAGNOSIS — E78.2 MIXED HYPERLIPIDEMIA: Chronic | ICD-10-CM

## 2025-04-21 DIAGNOSIS — R73.01 IMPAIRED FASTING BLOOD SUGAR: ICD-10-CM

## 2025-04-21 DIAGNOSIS — Z95.2 S/P AORTIC VALVE REPLACEMENT: Primary | Chronic | ICD-10-CM

## 2025-04-21 LAB
ALBUMIN SERPL-MCNC: 4 G/DL (ref 3.4–5)
ALBUMIN/GLOB SERPL: 1.3 {RATIO} (ref 1.1–2.2)
ALP SERPL-CCNC: 99 U/L (ref 40–129)
ALT SERPL-CCNC: 18 U/L (ref 10–40)
ANION GAP SERPL CALCULATED.3IONS-SCNC: 10 MMOL/L (ref 3–16)
AST SERPL-CCNC: 25 U/L (ref 15–37)
BILIRUB SERPL-MCNC: 0.6 MG/DL (ref 0–1)
BUN SERPL-MCNC: 14 MG/DL (ref 7–20)
CALCIUM SERPL-MCNC: 9.9 MG/DL (ref 8.3–10.6)
CHLORIDE SERPL-SCNC: 106 MMOL/L (ref 99–110)
CHOLEST SERPL-MCNC: 138 MG/DL (ref 0–199)
CO2 SERPL-SCNC: 26 MMOL/L (ref 21–32)
CREAT SERPL-MCNC: 0.8 MG/DL (ref 0.6–1.2)
DEPRECATED RDW RBC AUTO: 14.7 % (ref 12.4–15.4)
GFR SERPLBLD CREATININE-BSD FMLA CKD-EPI: 75 ML/MIN/{1.73_M2}
GLUCOSE SERPL-MCNC: 110 MG/DL (ref 70–99)
HCT VFR BLD AUTO: 36.9 % (ref 36–48)
HCV AB SERPL QL IA: NORMAL
HDLC SERPL-MCNC: 50 MG/DL (ref 40–60)
HGB BLD-MCNC: 11.9 G/DL (ref 12–16)
INTERNATIONAL NORMALIZATION RATIO, POC: 2.1
LDLC SERPL CALC-MCNC: 72 MG/DL
MCH RBC QN AUTO: 28.5 PG (ref 26–34)
MCHC RBC AUTO-ENTMCNC: 32.2 G/DL (ref 31–36)
MCV RBC AUTO: 88.6 FL (ref 80–100)
PLATELET # BLD AUTO: 155 K/UL (ref 135–450)
PMV BLD AUTO: 9.9 FL (ref 5–10.5)
POTASSIUM SERPL-SCNC: 4.4 MMOL/L (ref 3.5–5.1)
PROT SERPL-MCNC: 7.1 G/DL (ref 6.4–8.2)
PROTHROMBIN TIME, POC: 0
RBC # BLD AUTO: 4.16 M/UL (ref 4–5.2)
SODIUM SERPL-SCNC: 142 MMOL/L (ref 136–145)
TRIGL SERPL-MCNC: 80 MG/DL (ref 0–150)
TSH SERPL DL<=0.005 MIU/L-ACNC: 3.71 UIU/ML (ref 0.27–4.2)
VLDLC SERPL CALC-MCNC: 16 MG/DL
WBC # BLD AUTO: 5 K/UL (ref 4–11)

## 2025-04-21 PROCEDURE — 80053 COMPREHEN METABOLIC PANEL: CPT

## 2025-04-21 PROCEDURE — 85027 COMPLETE CBC AUTOMATED: CPT

## 2025-04-21 PROCEDURE — 80061 LIPID PANEL: CPT

## 2025-04-21 PROCEDURE — 84443 ASSAY THYROID STIM HORMONE: CPT

## 2025-04-21 PROCEDURE — 83036 HEMOGLOBIN GLYCOSYLATED A1C: CPT

## 2025-04-21 PROCEDURE — 85610 PROTHROMBIN TIME: CPT | Performed by: PHYSICIAN ASSISTANT

## 2025-04-21 PROCEDURE — 36415 COLL VENOUS BLD VENIPUNCTURE: CPT

## 2025-04-21 PROCEDURE — 86803 HEPATITIS C AB TEST: CPT

## 2025-04-21 PROCEDURE — 99211 OFF/OP EST MAY X REQ PHY/QHP: CPT | Performed by: PHYSICIAN ASSISTANT

## 2025-04-21 NOTE — PROGRESS NOTES
.Ms. Naye Peña is a 80 y.o. y/o female with history of Afib, Aortic and Mitral Valve  Porcine valve.    She presents today for anticoagulation monitoring and adjustment.    Pertinent PMH: The aortic and mitral valves are porcine and were replaced at the same time on March 9, 2010 by Dr. Jerome Jimenez.     Patient Reported Findings:  Yes     No  [x]   []       Patient verifies current dosing regimen as listed- confirms   []   [x]       S/S bleeding/bruising/swelling/SOB -denies   []   [x]       Blood in urine or stool -denies   []   [x]       Procedures scheduled in the future at this time  -denies    []   [x]       Missed Dose- denies  []   [x]       Extra Dose- denies   []   [x]       Change in medications- ran out of glucosamine chondroitin but plans to resume---> is taking MVI but erratic, explained best to be consistent---> back on probiotic and MVI, wants to start fish oil, explained interaction --> started fish oil BID --> restarting iron last week---> started vit d---> switched coreg to metoprolol. ---> denies   [x]   []       Change in health/diet/appetite.  Her norm would be spinach, broccoli, shahzad or turnip greens twice a week and salads with iceberg other lower vitamin K --> Patient reports having shahzad greens 3 times weekly, this is an increase in vitamin K content from previous visits --> erratic, no NVD---> returned to normal vit k intake . States was having stomach --> maybe 2-3x/week, pt unsure ---> eating a small amount of greens three times/week --> returned to vit k 3x/week---> eating more greens---> less greens --> states increased vegetable intake, especially cabbage, spinach, broccoli, mixed greens, & salads --> vit k 3x/week---> twice/week --> had vit k less, 2-3 in past few weeks---> no changes --> didn't have as many greens this week --> returned to vit k, had spinach recently   []   [x]       Change in alcohol use ---> Denies  []   [x]       Change in activity  []   [x]

## 2025-04-22 ENCOUNTER — TELEPHONE (OUTPATIENT)
Dept: INTERNAL MEDICINE CLINIC | Age: 80
End: 2025-04-22

## 2025-04-22 ENCOUNTER — RESULTS FOLLOW-UP (OUTPATIENT)
Dept: INTERNAL MEDICINE CLINIC | Age: 80
End: 2025-04-22

## 2025-04-22 LAB
EST. AVERAGE GLUCOSE BLD GHB EST-MCNC: 137 MG/DL
HBA1C MFR BLD: 6.4 %

## 2025-04-23 ENCOUNTER — HOSPITAL ENCOUNTER (OUTPATIENT)
Dept: GENERAL RADIOLOGY | Age: 80
Discharge: HOME | End: 2025-04-23
Payer: MEDICARE

## 2025-04-23 ENCOUNTER — RESULTS FOLLOW-UP (OUTPATIENT)
Dept: INTERNAL MEDICINE CLINIC | Age: 80
End: 2025-04-23

## 2025-04-23 DIAGNOSIS — Z78.0 ASYMPTOMATIC MENOPAUSE: ICD-10-CM

## 2025-04-23 PROCEDURE — 77080 DXA BONE DENSITY AXIAL: CPT

## 2025-05-19 ENCOUNTER — ANTI-COAG VISIT (OUTPATIENT)
Dept: PHARMACY | Age: 80
End: 2025-05-19
Payer: MEDICARE

## 2025-05-19 DIAGNOSIS — I48.20 CHRONIC ATRIAL FIBRILLATION (HCC): ICD-10-CM

## 2025-05-19 DIAGNOSIS — Z95.2 S/P MITRAL VALVE REPLACEMENT: Chronic | ICD-10-CM

## 2025-05-19 DIAGNOSIS — Z95.2 S/P AORTIC VALVE REPLACEMENT: Primary | Chronic | ICD-10-CM

## 2025-05-19 LAB
INTERNATIONAL NORMALIZATION RATIO, POC: 2.1
PROTHROMBIN TIME, POC: 0

## 2025-05-19 PROCEDURE — 99211 OFF/OP EST MAY X REQ PHY/QHP: CPT

## 2025-05-19 PROCEDURE — 85610 PROTHROMBIN TIME: CPT

## 2025-05-19 NOTE — PROGRESS NOTES
.Ms. Naye Peña is a 80 y.o. y/o female with history of Afib, Aortic and Mitral Valve  Porcine valve.    She presents today for anticoagulation monitoring and adjustment.    Pertinent PMH: The aortic and mitral valves are porcine and were replaced at the same time on March 9, 2010 by Dr. Jerome Jimenez.     Patient Reported Findings:  Yes     No  [x]   []       Patient verifies current dosing regimen as listed- confirms   []   [x]       S/S bleeding/bruising/swelling/SOB -denies   []   [x]       Blood in urine or stool -denies   []   [x]       Procedures scheduled in the future at this time  -denies    []   [x]       Missed Dose- denies  []   [x]       Extra Dose- denies   []   [x]       Change in medications- ran out of glucosamine chondroitin but plans to resume---> is taking MVI but erratic, explained best to be consistent---> back on probiotic and MVI, wants to start fish oil, explained interaction --> started fish oil BID --> restarting iron last week---> started vit d---> switched coreg to metoprolol. ---> resumed fish oil about 2 weeks ago but states not consistent with it daily, advised to remain consistent as can affect INR, pt will decide what she wants her routine to be   [x]   []       Change in health/diet/appetite.  Her norm would be spinach, broccoli, shahzad or turnip greens twice a week and salads with iceberg other lower vitamin K --> Patient reports having shahzad greens 3 times weekly, this is an increase in vitamin K content from previous visits --> erratic, no NVD---> returned to normal vit k intake . States was having stomach --> maybe 2-3x/week, pt unsure ---> eating a small amount of greens three times/week --> returned to vit k 3x/week---> eating more greens---> less greens --> states increased vegetable intake, especially cabbage, spinach, broccoli, mixed greens, & salads --> vit k 3x/week---> twice/week --> had vit k less, 2-3 in past few weeks---> no changes --> didn't have as many

## 2025-06-09 ENCOUNTER — ANTI-COAG VISIT (OUTPATIENT)
Dept: PHARMACY | Age: 80
End: 2025-06-09
Payer: MEDICARE

## 2025-06-09 ENCOUNTER — RESULTS FOLLOW-UP (OUTPATIENT)
Dept: INTERNAL MEDICINE CLINIC | Age: 80
End: 2025-06-09

## 2025-06-09 ENCOUNTER — HOSPITAL ENCOUNTER (OUTPATIENT)
Dept: WOMENS IMAGING | Age: 80
Discharge: HOME | End: 2025-06-09
Payer: MEDICARE

## 2025-06-09 VITALS — BODY MASS INDEX: 34.23 KG/M2 | WEIGHT: 186 LBS | HEIGHT: 62 IN

## 2025-06-09 DIAGNOSIS — Z12.31 BREAST CANCER SCREENING BY MAMMOGRAM: ICD-10-CM

## 2025-06-09 DIAGNOSIS — Z95.2 S/P AORTIC VALVE REPLACEMENT: Primary | Chronic | ICD-10-CM

## 2025-06-09 DIAGNOSIS — I48.20 CHRONIC ATRIAL FIBRILLATION (HCC): ICD-10-CM

## 2025-06-09 DIAGNOSIS — Z95.2 S/P MITRAL VALVE REPLACEMENT: Chronic | ICD-10-CM

## 2025-06-09 LAB
INTERNATIONAL NORMALIZATION RATIO, POC: 2.6
PROTHROMBIN TIME, POC: 0

## 2025-06-09 PROCEDURE — 85610 PROTHROMBIN TIME: CPT

## 2025-06-09 PROCEDURE — 99211 OFF/OP EST MAY X REQ PHY/QHP: CPT

## 2025-06-09 PROCEDURE — 77063 BREAST TOMOSYNTHESIS BI: CPT

## 2025-06-09 NOTE — PROGRESS NOTES
weeks---> no changes --> didn't have as many greens this week --> returned to vit k, had spinach recently--->denies changes, greens last night     []   [x]       Change in alcohol use ---> Denies  []   [x]       Change in activity  []   [x]       Hospital admission  []   [x]       Emergency department visit  []   [x]       Other complaints     Clinical Outcomes:  Yes     No  []   [x]       Major bleeding event  []   [x]       Thromboembolic event    Duration of warfarin Therapy: Indefinitely  INR Range:  2.0-3.0   Keep at 2.5-3.0 per the collaborative agreement from Dr. Malena GUILLEN warfarin at [facility] pharmacy     INR is 2.6 today   Continue dose of 2.5mg on Thursdays only and 5mg all other days.   Recheck INR in 4 weeks, 7/7  Consent signed 12/23/24    Referring is Dr. Margaret Pascal   INR (no units)   Date Value   04/28/2020 2.0 (H)   02/27/2019 1.11   09/17/2018 4   08/20/2018 2.8     INR,(POC) (no units)   Date Value   05/19/2025 2.1   04/21/2025 2.1   03/24/2025 3.1   02/24/2025 2.2     For Pharmacy Admin Tracking Only  Total # of Interventions Recommended: 0  Total # of Interventions Accepted: 0  Time Spent (min): 15

## 2025-06-19 DIAGNOSIS — I48.20 CHRONIC ATRIAL FIBRILLATION (HCC): Primary | ICD-10-CM

## 2025-07-07 ENCOUNTER — ANTI-COAG VISIT (OUTPATIENT)
Dept: PHARMACY | Age: 80
End: 2025-07-07
Payer: MEDICARE

## 2025-07-07 DIAGNOSIS — Z95.2 S/P AORTIC VALVE REPLACEMENT: Primary | Chronic | ICD-10-CM

## 2025-07-07 DIAGNOSIS — Z95.2 S/P MITRAL VALVE REPLACEMENT: Chronic | ICD-10-CM

## 2025-07-07 DIAGNOSIS — I48.20 CHRONIC ATRIAL FIBRILLATION (HCC): ICD-10-CM

## 2025-07-07 LAB
INTERNATIONAL NORMALIZATION RATIO, POC: 3.1
PROTHROMBIN TIME, POC: 0

## 2025-07-07 PROCEDURE — 85610 PROTHROMBIN TIME: CPT | Performed by: PHYSICIAN ASSISTANT

## 2025-07-07 PROCEDURE — 99211 OFF/OP EST MAY X REQ PHY/QHP: CPT | Performed by: PHYSICIAN ASSISTANT

## 2025-07-07 RX ORDER — FUROSEMIDE 20 MG/1
20 TABLET ORAL DAILY PRN
Qty: 90 TABLET | Refills: 0 | Status: SHIPPED | OUTPATIENT
Start: 2025-07-07

## 2025-07-07 NOTE — PROGRESS NOTES
.Ms. Naye Peña is a 80 y.o. y/o female with history of Afib, Aortic and Mitral Valve Porcine valve.    She presents today for anticoagulation monitoring and adjustment.    Pertinent PMH: The aortic and mitral valves are porcine and were replaced at the same time on March 9, 2010 by Dr. Jerome Jimenez.     Patient Reported Findings:  Yes     No  [x]   []       Patient verifies current dosing regimen as listed- confirms   []   [x]       S/S bleeding/bruising/swelling/SOB -denies   []   [x]       Blood in urine or stool -denies   []   [x]       Procedures scheduled in the future at this time  -denies    []   [x]       Missed Dose- denies  []   [x]       Extra Dose- denies   [x]   []       Change in medications- ran out of glucosamine chondroitin but plans to resume---> is taking MVI but erratic, explained best to be consistent---> back on probiotic and MVI, wants to start fish oil, explained interaction --> started fish oil BID --> restarting iron last week---> started vit d---> switched coreg to metoprolol. ---> resumed fish oil about 2 weeks ago but states not consistent with it daily, advised to remain consistent as can affect INR, pt will decide what she wants her routine to be---> sticking with MVI and fish oil, no changes --> resumed lasix    []   [x]       Change in health/diet/appetite.  Her norm would be spinach, broccoli, shahzad or turnip greens twice a week and salads with iceberg other lower vitamin K --> Patient reports having shahzad greens 3 times weekly, this is an increase in vitamin K content from previous visits --> erratic, no NVD---> returned to vit k 3x/week---> states increased vegetable intake, especially cabbage, spinach, broccoli, mixed greens, & salads --> vit k 3x/week---> twice/week --> had vit k less, 2-3 in past few weeks--> didn't have as many greens this week --> returned to vit k, had spinach recently--->denies changes, greens last night ---> no changes     []   [x]       Change in

## 2025-07-23 ENCOUNTER — HOSPITAL ENCOUNTER (OUTPATIENT)
Age: 80
Discharge: HOME | End: 2025-07-25
Attending: INTERNAL MEDICINE
Payer: MEDICARE

## 2025-07-23 VITALS
HEIGHT: 62 IN | SYSTOLIC BLOOD PRESSURE: 110 MMHG | BODY MASS INDEX: 34.23 KG/M2 | DIASTOLIC BLOOD PRESSURE: 68 MMHG | WEIGHT: 186 LBS

## 2025-07-23 DIAGNOSIS — R06.02 SHORTNESS OF BREATH: ICD-10-CM

## 2025-07-23 LAB
ECHO AO ASC DIAM: 3.2 CM
ECHO AO ASCENDING AORTA INDEX: 1.73 CM/M2
ECHO AO ROOT DIAM: 3.4 CM
ECHO AO ROOT INDEX: 1.84 CM/M2
ECHO AV ACCELERATION TIME: 82 MS
ECHO AV AREA PEAK VELOCITY: 0.6 CM2
ECHO AV AREA VTI: 0.7 CM2
ECHO AV AREA/BSA PEAK VELOCITY: 0.3 CM2/M2
ECHO AV AREA/BSA VTI: 0.4 CM2/M2
ECHO AV MEAN GRADIENT: 14 MMHG
ECHO AV MEAN VELOCITY: 1.8 M/S
ECHO AV PEAK GRADIENT: 25 MMHG
ECHO AV PEAK VELOCITY: 2.5 M/S
ECHO AV VELOCITY RATIO: 0.32
ECHO AV VTI: 60.3 CM
ECHO BSA: 1.92 M2
ECHO EST RA PRESSURE: 3 MMHG
ECHO IVC PROX: 1.2 CM
ECHO LA AREA 2C: 32 CM2
ECHO LA AREA 4C: 31.3 CM2
ECHO LA DIAMETER INDEX: 2.86 CM/M2
ECHO LA DIAMETER: 5.3 CM
ECHO LA MAJOR AXIS: 6.9 CM
ECHO LA MINOR AXIS: 7.1 CM
ECHO LA TO AORTIC ROOT RATIO: 1.56
ECHO LA VOL BP: 118 ML (ref 22–52)
ECHO LA VOL MOD A2C: 120 ML (ref 22–52)
ECHO LA VOL MOD A4C: 114 ML (ref 22–52)
ECHO LA VOL/BSA BIPLANE: 64 ML/M2 (ref 16–34)
ECHO LA VOLUME INDEX MOD A2C: 65 ML/M2 (ref 16–34)
ECHO LA VOLUME INDEX MOD A4C: 62 ML/M2 (ref 16–34)
ECHO LV E' LATERAL VELOCITY: 6.53 CM/S
ECHO LV E' SEPTAL VELOCITY: 5.87 CM/S
ECHO LV EDV A2C: 44 ML
ECHO LV EDV A4C: 75 ML
ECHO LV EDV INDEX A4C: 41 ML/M2
ECHO LV EDV NDEX A2C: 24 ML/M2
ECHO LV EF PHYSICIAN: 53 %
ECHO LV EJECTION FRACTION A2C: 52 %
ECHO LV EJECTION FRACTION A4C: 57 %
ECHO LV EJECTION FRACTION BIPLANE: 55 % (ref 55–100)
ECHO LV ESV A2C: 21 ML
ECHO LV ESV A4C: 32 ML
ECHO LV ESV INDEX A2C: 11 ML/M2
ECHO LV ESV INDEX A4C: 17 ML/M2
ECHO LV FRACTIONAL SHORTENING: 30 % (ref 28–44)
ECHO LV INTERNAL DIMENSION DIASTOLE INDEX: 1.62 CM/M2
ECHO LV INTERNAL DIMENSION DIASTOLIC: 3 CM (ref 3.9–5.3)
ECHO LV INTERNAL DIMENSION SYSTOLIC INDEX: 1.14 CM/M2
ECHO LV INTERNAL DIMENSION SYSTOLIC: 2.1 CM
ECHO LV IVSD: 1.4 CM (ref 0.6–0.9)
ECHO LV MASS 2D: 149 G (ref 67–162)
ECHO LV MASS INDEX 2D: 80.5 G/M2 (ref 43–95)
ECHO LV POSTERIOR WALL DIASTOLIC: 1.5 CM (ref 0.6–0.9)
ECHO LV RELATIVE WALL THICKNESS RATIO: 1
ECHO LVOT AREA: 2 CM2
ECHO LVOT AV VTI INDEX: 0.34
ECHO LVOT DIAM: 1.6 CM
ECHO LVOT MEAN GRADIENT: 2 MMHG
ECHO LVOT PEAK GRADIENT: 2 MMHG
ECHO LVOT PEAK VELOCITY: 0.8 M/S
ECHO LVOT STROKE VOLUME INDEX: 22.2 ML/M2
ECHO LVOT SV: 41 ML
ECHO LVOT VTI: 20.4 CM
ECHO MV AREA PHT: 2 CM2
ECHO MV AREA VTI: 0.7 CM2
ECHO MV LVOT VTI INDEX: 2.91
ECHO MV MAX VELOCITY: 2.3 M/S
ECHO MV MEAN GRADIENT: 12 MMHG
ECHO MV MEAN VELOCITY: 1.6 M/S
ECHO MV PEAK GRADIENT: 21 MMHG
ECHO MV PRESSURE HALF TIME (PHT): 110 MS
ECHO MV VTI: 59.3 CM
ECHO PULMONARY ARTERY END DIASTOLIC PRESSURE: 5 MMHG
ECHO PV MAX VELOCITY: 0.7 M/S
ECHO PV PEAK GRADIENT: 2 MMHG
ECHO PV REGURGITANT MAX VELOCITY: 1.1 M/S
ECHO RA AREA 4C: 17.2 CM2
ECHO RA END SYSTOLIC VOLUME APICAL 4 CHAMBER INDEX BSA: 21 ML/M2
ECHO RA VOLUME: 38 ML
ECHO RIGHT VENTRICULAR SYSTOLIC PRESSURE (RVSP): 43 MMHG
ECHO RV BASAL DIMENSION: 2.9 CM
ECHO RV FREE WALL PEAK S': 8 CM/S
ECHO RV TAPSE: 1.2 CM (ref 1.7–?)
ECHO TV REGURGITANT MAX VELOCITY: 3.16 M/S
ECHO TV REGURGITANT PEAK GRADIENT: 40 MMHG

## 2025-07-23 PROCEDURE — 93306 TTE W/DOPPLER COMPLETE: CPT | Performed by: INTERNAL MEDICINE

## 2025-07-23 PROCEDURE — 93306 TTE W/DOPPLER COMPLETE: CPT

## 2025-07-25 NOTE — PROGRESS NOTES
artery systolic pressure is mildly elevated at 40 mmHg  assuming a right atrial pressure of 3 mmHg. The left atrium is moderately dilated. Right ventricular systolic function is mildly reduced . TAPSe 1.3 cm. RV S velocity 8.5 cm/s.    Echo 8/28/20: EF 60%. Grade II DD, A bioprosthetic mitral valve is well seated with peak velocity of 2.3m/s and a mean gradient of 7mmHg. Mild mitral regurgitation. A bioprosthetic artificial aortic valve appears well seated with a maximum velocity of 2.5m/s and a mean gradient of 16mmHg.  Moderate tricuspid regurgitation. Estimated pulmonary artery systolic pressure is mildly to moderately elevated at 42 mmHg assuming a right atrial pressure of 3 mmHg. The left atrium is dilated.    Echo 4/17/14: EF 55%. reversal of E/A inflow velocities across the mitral valve suggesting impaired left ventricular relaxation.v A prosthetic mitral valve is well seated with maximum gradient of 15 mmHg and a mean gradient of 6 mmHg. Mild mitral regurgitation is present. The left atrium is dilated. A prosthetic aortic valve appears well seated with a maximum gradient of 24mmHg and a mean gradient of 15 mmHg. No evidence of aortic valve regurgitation.  mild to moderate tricuspid regurgitation with RVSP estimated at 41mmHg. Mild pulmonic regurgitation present   3. S/P mitral valve replacement -see echo findings above; she now has an increased gradient through the MV.  I will not pursue retesting unless she has symptoms or it becomes worse.  She would require redo surgery at increased risk.    4. Hyperlipidemia, unspecified hyperlipidemia type -well controlled on labs 4/21/25: ; TRIG 80; HDL 50; LDL 72, continue Simvastatin 40mg daily       PLAN:  Echo findings discussed with Naye. Advised to call if she develops BUENROSTRO/PND. Repeat echo in 1 year. FU 6 months.     Scribe's attestation: This note was scribed in the presence of Dr Naida PATTON by Nicol Archibald RN.The scribe's documentation has been

## 2025-07-30 ENCOUNTER — OFFICE VISIT (OUTPATIENT)
Dept: CARDIOLOGY CLINIC | Age: 80
End: 2025-07-30
Attending: INTERNAL MEDICINE
Payer: MEDICARE

## 2025-07-30 VITALS
WEIGHT: 186 LBS | SYSTOLIC BLOOD PRESSURE: 110 MMHG | BODY MASS INDEX: 34.02 KG/M2 | DIASTOLIC BLOOD PRESSURE: 70 MMHG | HEART RATE: 96 BPM | OXYGEN SATURATION: 97 %

## 2025-07-30 DIAGNOSIS — I48.20 CHRONIC ATRIAL FIBRILLATION (HCC): ICD-10-CM

## 2025-07-30 DIAGNOSIS — Z95.2 S/P MITRAL VALVE REPLACEMENT: ICD-10-CM

## 2025-07-30 DIAGNOSIS — E78.5 HYPERLIPIDEMIA, UNSPECIFIED HYPERLIPIDEMIA TYPE: ICD-10-CM

## 2025-07-30 DIAGNOSIS — Z95.2 S/P AORTIC VALVE REPLACEMENT: Primary | ICD-10-CM

## 2025-07-30 PROCEDURE — G8417 CALC BMI ABV UP PARAM F/U: HCPCS | Performed by: INTERNAL MEDICINE

## 2025-07-30 PROCEDURE — G8427 DOCREV CUR MEDS BY ELIG CLIN: HCPCS | Performed by: INTERNAL MEDICINE

## 2025-07-30 PROCEDURE — G2211 COMPLEX E/M VISIT ADD ON: HCPCS | Performed by: INTERNAL MEDICINE

## 2025-07-30 PROCEDURE — 1036F TOBACCO NON-USER: CPT | Performed by: INTERNAL MEDICINE

## 2025-07-30 PROCEDURE — 1090F PRES/ABSN URINE INCON ASSESS: CPT | Performed by: INTERNAL MEDICINE

## 2025-07-30 PROCEDURE — 1123F ACP DISCUSS/DSCN MKR DOCD: CPT | Performed by: INTERNAL MEDICINE

## 2025-07-30 PROCEDURE — 99214 OFFICE O/P EST MOD 30 MIN: CPT | Performed by: INTERNAL MEDICINE

## 2025-07-30 PROCEDURE — 1159F MED LIST DOCD IN RCRD: CPT | Performed by: INTERNAL MEDICINE

## 2025-07-30 PROCEDURE — G8399 PT W/DXA RESULTS DOCUMENT: HCPCS | Performed by: INTERNAL MEDICINE

## 2025-08-04 ENCOUNTER — ANTI-COAG VISIT (OUTPATIENT)
Dept: PHARMACY | Age: 80
End: 2025-08-04
Payer: MEDICARE

## 2025-08-04 DIAGNOSIS — Z95.2 S/P AORTIC VALVE REPLACEMENT: Primary | Chronic | ICD-10-CM

## 2025-08-04 DIAGNOSIS — Z95.2 S/P MITRAL VALVE REPLACEMENT: Chronic | ICD-10-CM

## 2025-08-04 DIAGNOSIS — I48.20 CHRONIC ATRIAL FIBRILLATION (HCC): ICD-10-CM

## 2025-08-04 LAB
INTERNATIONAL NORMALIZATION RATIO, POC: 2
PROTHROMBIN TIME, POC: 0

## 2025-08-04 PROCEDURE — 85610 PROTHROMBIN TIME: CPT

## 2025-08-04 PROCEDURE — 99211 OFF/OP EST MAY X REQ PHY/QHP: CPT

## 2025-09-02 ENCOUNTER — ANTI-COAG VISIT (OUTPATIENT)
Dept: PHARMACY | Age: 80
End: 2025-09-02
Payer: MEDICARE

## 2025-09-02 DIAGNOSIS — I48.20 CHRONIC ATRIAL FIBRILLATION (HCC): ICD-10-CM

## 2025-09-02 DIAGNOSIS — Z95.2 S/P AORTIC VALVE REPLACEMENT: Primary | Chronic | ICD-10-CM

## 2025-09-02 DIAGNOSIS — Z95.2 S/P MITRAL VALVE REPLACEMENT: Chronic | ICD-10-CM

## 2025-09-02 LAB
INTERNATIONAL NORMALIZATION RATIO, POC: 2.9
PROTHROMBIN TIME, POC: NORMAL

## 2025-09-02 PROCEDURE — 99211 OFF/OP EST MAY X REQ PHY/QHP: CPT

## 2025-09-02 PROCEDURE — 85610 PROTHROMBIN TIME: CPT

## (undated) DEVICE — YANKAUER,BULB TIP,W/O VENT,RIGID,STERILE: Brand: MEDLINE

## (undated) DEVICE — PACK PROCEDURE SURG EXTREMITY MFFOP CUST

## (undated) DEVICE — 3M™ IOBAN™ 2 ANTIMICROBIAL INCISE DRAPE 6650EZ: Brand: IOBAN™ 2

## (undated) DEVICE — SUTURE VCRL SZ 3-0 L18IN ABSRB UD L26MM SH 1/2 CIR J864D

## (undated) DEVICE — ELECTRODE PT RET AD L9FT HI MOIST COND ADH HYDRGEL CORDED

## (undated) DEVICE — MEDICINE CUP, GRADUATED, STER: Brand: MEDLINE

## (undated) DEVICE — SPECIMEN ORIENTATION CHARMS, SIX DISTINCTLY SHAPED STERILE 10MM CHARMS: Brand: MARGINMAP

## (undated) DEVICE — SPONGE LAP W18XL18IN WHT COT 4 PLY FLD STRUNG RADPQ DISP ST

## (undated) DEVICE — BLADE ES ELASTOMERIC COAT INSUL DURABLE BEND UPTO 90DEG

## (undated) DEVICE — SOLUTION IV IRRIG 500ML 0.9% SODIUM CHL 2F7123

## (undated) DEVICE — GLOVE SURG SZ 6.5 L11.2IN FNGR THK9.8MIL STRW LTX POLYMER

## (undated) DEVICE — SKIN AFFIX SURG ADHESIVE 72/CS 0.55ML: Brand: MEDLINE

## (undated) DEVICE — ROOM TURNOVER KIT W/ ARM STRP

## (undated) DEVICE — CHLORAPREP 26ML ORANGE

## (undated) DEVICE — GLOVE SURG SZ 7 L12IN THK7.5MIL DK GRN LTX FREE MSG6570] MEDLINE INDUSTRIES INC]

## (undated) DEVICE — SUTURE MCRYL SZ 4-0 L27IN ABSRB UD L19MM PS-2 1/2 CIR PRIM Y426H

## (undated) DEVICE — TOWEL,OR,DSP,ST,BLUE,STD,4/PK,20PK/CS: Brand: MEDLINE

## (undated) DEVICE — NEEDLE HYPO 22GA L1.5IN BLK POLYPR HUB S STL REG BVL STR

## (undated) DEVICE — Device

## (undated) DEVICE — APPLIER CLP L9.38IN M LIG TI DISP STR RNG HNDL LIGACLP

## (undated) DEVICE — STAPLER SKIN H3.9MM WIRE DIA0.58MM CRWN 6.9MM 35 STPL ROT

## (undated) DEVICE — DRAPE,CHEST,FENES,15X10,STERIL: Brand: MEDLINE

## (undated) DEVICE — PENCIL SMK EVAC L10FT TBNG NONSTICK ESU BLDE PLUMEPEN ELITE

## (undated) DEVICE — GAUZE,SPONGE,4"X4",8PLY,STRL,LF,10/TRAY: Brand: MEDLINE

## (undated) DEVICE — 3M™ WARMING BLANKET, LOWER BODY, 10 PER CASE, 42568: Brand: BAIR HUGGER™

## (undated) DEVICE — INTENDED FOR TISSUE SEPARATION, AND OTHER PROCEDURES THAT REQUIRE A SHARP SURGICAL BLADE TO PUNCTURE OR CUT.: Brand: BARD-PARKER ® STAINLESS STEEL BLADES